# Patient Record
Sex: FEMALE | Race: WHITE | Employment: OTHER | URBAN - METROPOLITAN AREA
[De-identification: names, ages, dates, MRNs, and addresses within clinical notes are randomized per-mention and may not be internally consistent; named-entity substitution may affect disease eponyms.]

---

## 2017-01-03 NOTE — TELEPHONE ENCOUNTER
Patient wants to know if she can get a prescription refill or if she needs to be seen first.    If you can give her a call back when you can.

## 2017-01-04 RX ORDER — VENLAFAXINE HYDROCHLORIDE 150 MG/1
CAPSULE, EXTENDED RELEASE ORAL
Qty: 30 CAP | Refills: 0 | Status: SHIPPED | OUTPATIENT
Start: 2017-01-04 | End: 2017-01-19 | Stop reason: SDUPTHER

## 2017-01-19 ENCOUNTER — OFFICE VISIT (OUTPATIENT)
Dept: NEUROLOGY | Age: 47
End: 2017-01-19

## 2017-01-19 VITALS
WEIGHT: 118 LBS | DIASTOLIC BLOOD PRESSURE: 86 MMHG | HEIGHT: 62 IN | OXYGEN SATURATION: 98 % | BODY MASS INDEX: 21.71 KG/M2 | RESPIRATION RATE: 20 BRPM | SYSTOLIC BLOOD PRESSURE: 130 MMHG | HEART RATE: 97 BPM

## 2017-01-19 DIAGNOSIS — G43.709 CHRONIC MIGRAINE WITHOUT AURA WITHOUT STATUS MIGRAINOSUS, NOT INTRACTABLE: Primary | ICD-10-CM

## 2017-01-19 DIAGNOSIS — F41.9 ANXIETY: ICD-10-CM

## 2017-01-19 RX ORDER — CHOLECALCIFEROL (VITAMIN D3) 125 MCG
220 CAPSULE ORAL DAILY
COMMUNITY
End: 2017-12-14 | Stop reason: ALTCHOICE

## 2017-01-19 RX ORDER — VENLAFAXINE HYDROCHLORIDE 150 MG/1
CAPSULE, EXTENDED RELEASE ORAL
Qty: 30 CAP | Refills: 5 | Status: SHIPPED | OUTPATIENT
Start: 2017-01-19 | End: 2017-07-30 | Stop reason: SDUPTHER

## 2017-01-19 RX ORDER — MEDROXYPROGESTERONE ACETATE 150 MG/ML
150 INJECTION, SUSPENSION INTRAMUSCULAR
COMMUNITY

## 2017-01-19 NOTE — PATIENT INSTRUCTIONS

## 2017-01-19 NOTE — PROGRESS NOTES
Migraines- have been pretty controllable she may have 1-2 a month that might wipe her out   Here recently she had a weeks worth that was rated a 6 all week, more stress and wasn't sure if it was residual from her being in pain with her wrist and arm   It was just more annoying than anything else

## 2017-01-19 NOTE — MR AVS SNAPSHOT
Visit Information Date & Time Provider Department Dept. Phone Encounter #  
 1/19/2017 11:00 AM Kera Fields NP [facility] [phone] [id number] Follow-up Instructions Return in about 6 months (around 7/19/2017). Upcoming Health Maintenance Date Due DTaP/Tdap/Td series (1 - Tdap) 12/31/1992 PAP AKA CERVICAL CYTOLOGY 12/31/1992 INFLUENZA AGE 9 TO ADULT 8/1/2016 Allergies as of 1/19/2017  Review Complete On: 1/19/2017 By: Kera Fields NP Severity Noted Reaction Type Reactions Iodine  12/15/2010    Other (comments) Shellfish Containing Products  12/15/2010    Other (comments) Current Immunizations  Never Reviewed No immunizations on file. Not reviewed this visit You Were Diagnosed With   
  
 Codes Comments Chronic migraine without aura without status migrainosus, not intractable    -  Primary ICD-10-CM: V57.801 ICD-9-CM: 346.70 Anxiety     ICD-10-CM: F41.9 ICD-9-CM: 300.00 Vitals BP Pulse Resp Height(growth percentile) Weight(growth percentile) SpO2  
 130/86 97 20 5' 2\" (1.575 m) 118 lb (53.5 kg) 98% BMI OB Status Smoking Status 21.58 kg/m2 Having regular periods Never Smoker Vitals History BMI and BSA Data Body Mass Index Body Surface Area  
 21.58 kg/m 2 1.53 m 2 Preferred Pharmacy Pharmacy Name Phone [facility] [address] [place] [address] [phone] Your Updated Medication List  
  
   
This list is accurate as of: 1/19/17 11:58 AM.  Always use your most recent med list.  
  
  
  
  
 ALEVE 220 mg Cap Generic drug:  naproxen sodium Take 220 mg by mouth daily. DEPO-PROVERA 150 mg/mL Syrg Generic drug:  medroxyPROGESTERone 150 mg by IntraMUSCular route once. xkspjhv-wlwokielp-zxqqtxjdajjk -325 mg capsule Commonly known as:  Archana Gaviria  
 2 po at onset of headache, then one po Q1 hour x 3 if needed. Max of 5 in 12 hours * OTHER  
EHT- supplement for migraines * OTHER Gabapentin 5%, Meloxicam 0.1%, Topiramate 1% in EMLA cream  1-2 gm 3-4 x per day prn  
  
 venlafaxine- mg capsule Commonly known as:  EFFEXOR-XR  
TAKE ONE CAPSULE BY MOUTH EVERY DAY  
  
 * Notice: This list has 2 medication(s) that are the same as other medications prescribed for you. Read the directions carefully, and ask your doctor or other care provider to review them with you. Prescriptions Sent to Pharmacy Refills  
 venlafaxine-SR (EFFEXOR-XR) 150 mg capsule 5 Sig: TAKE ONE CAPSULE BY MOUTH EVERY DAY Class: Normal  
 Pharmacy: [facility] [address] [place] AT [address] #: [phone] Follow-up Instructions Return in about 6 months (around 7/19/2017). Patient Instructions A Healthy Lifestyle: Care Instructions Your Care Instructions A healthy lifestyle can help you feel good, stay at a healthy weight, and have plenty of energy for both work and play. A healthy lifestyle is something you can share with your whole family. A healthy lifestyle also can lower your risk for serious health problems, such as high blood pressure, heart disease, and diabetes. You can follow a few steps listed below to improve your health and the health of your family. Follow-up care is a key part of your treatment and safety. Be sure to make and go to all appointments, and call your doctor if you are having problems. Its also a good idea to know your test results and keep a list of the medicines you take. How can you care for yourself at home? · Do not eat too much sugar, fat, or fast foods. You can still have dessert and treats now and then. The goal is moderation. · Start small to improve your eating habits.  Pay attention to portion sizes, drink less juice and soda pop, and eat more fruits and vegetables. ¨ Eat a healthy amount of food. A 3-ounce serving of meat, for example, is about the size of a deck of cards. Fill the rest of your plate with vegetables and whole grains. ¨ Limit the amount of soda and sports drinks you have every day. Drink more water when you are thirsty. ¨ Eat at least 5 servings of fruits and vegetables every day. It may seem like a lot, but it is not hard to reach this goal. A serving or helping is 1 piece of fruit, 1 cup of vegetables, or 2 cups of leafy, raw vegetables. Have an apple or some carrot sticks as an afternoon snack instead of a candy bar. Try to have fruits and/or vegetables at every meal. 
· Make exercise part of your daily routine. You may want to start with simple activities, such as walking, bicycling, or slow swimming. Try to be active 30 to 60 minutes every day. You do not need to do all 30 to 60 minutes all at once. For example, you can exercise 3 times a day for 10 or 20 minutes. Moderate exercise is safe for most people, but it is always a good idea to talk to your doctor before starting an exercise program. 
· Keep moving. Ashli ASYM IIIs the lawn, work in the garden, or [employer]. Take the stairs instead of the elevator at work. · If you smoke, quit. People who smoke have an increased risk for heart attack, stroke, cancer, and other lung illnesses. Quitting is hard, but there are ways to boost your chance of quitting tobacco for good. ¨ Use nicotine gum, patches, or lozenges. ¨ Ask your doctor about stop-smoking programs and medicines. ¨ Keep trying. In addition to reducing your risk of diseases in the future, you will notice some benefits soon after you stop using tobacco. If you have shortness of breath or asthma symptoms, they will likely get better within a few weeks after you quit. · Limit how much alcohol you drink.  Moderate amounts of alcohol (up to 2 drinks a day for men, 1 drink a day for women) are okay. But drinking too much can lead to liver problems, high blood pressure, and other health problems. Family health If you have a family, there are many things you can do together to improve your health. · Eat meals together as a family as often as possible. · Eat healthy foods. This includes fruits, vegetables, lean meats and dairy, and whole grains. · Include your family in your fitness plan. Most people think of activities such as jogging or tennis as the way to fitness, but there are many ways you and your family can be more active. Anything that makes you breathe hard and gets your heart pumping is exercise. Here are some tips: 
¨ Walk to do errands or to take your child to school or the bus. ¨ Go for a family bike ride after dinner instead of watching TV. Where can you learn more? Go to http://neeta-princess.info/. Enter R918 in the search box to learn more about \"A Healthy Lifestyle: Care Instructions. \" Current as of: July 26, 2016 Content Version: 11.1 © 2460-2396 Zecter. Care instructions adapted under license by ScreenTag (which disclaims liability or warranty for this information). If you have questions about a medical condition or this instruction, always ask your healthcare professional. [address] any warranty or liability for your use of this information. Introducing hospitals & HEALTH SERVICES! Leny Denson introduces Go800 patient portal. Now you can access parts of your medical record, email your doctor's office, and request medication refills online. 1. In your internet browser, go to [email or web address] 2. Click on the First Time User? Click Here link in the Sign In box. You will see the New Member Sign Up page. 3. Enter your Go800 Access Code exactly as it appears below.  You will not need to use this code after youve completed the sign-up process. If you do not sign up before the expiration date, you must request a new code. · Plivo Access Code: [email or web address] Expires: 4/19/2017 10:50 AM 
 
4. Enter the last four digits of your Social Security Number (xxxx) and Date of Birth (mm/dd/yyyy) as indicated and click Submit. You will be taken to the next sign-up page. 5. Create a Plivo ID. This will be your Plivo login ID and cannot be changed, so think of one that is secure and easy to remember. 6. Create a Plivo password. You can change your password at any time. 7. Enter your Password Reset Question and Answer. This can be used at a later time if you forget your password. 8. Enter your e-mail address. You will receive e-mail notification when new information is available in [address] 9. Click Sign Up. You can now view and download portions of your medical record. 10. Click the Download Summary menu link to download a portable copy of your medical information. If you have questions, please visit the Frequently Asked Questions section of the Plivo website. Remember, Plivo is NOT to be used for urgent needs. For medical emergencies, dial 911. Now available from your iPhone and Android! Please provide this summary of care documentation to your next provider. Your primary care clinician is listed as Jhony Galindo. If you have any questions after today's visit, please call [phone].

## 2017-01-30 NOTE — PROGRESS NOTES
Date:  2017    Name:  aDve Ramirez  :  1970  MRN:  [id number]     PCP:  Telma Lopez MD    Chief Complaint   Patient presents with    Neurologic Problem     migraine     HISTORY OF PRESENT ILLNESS: Follow up for migraines. States that they have been pretty well controlled since her last office visit. She did have Botox more than a year ago which was beneficial but she did not continue with this. Despite this, she has only been having 1-2 per month which would totally wipe her out. Anxiety is still well managed with the Effexor XR. Has not needed to use the Midrin. Still uses the topical cream on the ankle occasionally which helps when this is bothering her. Current Outpatient Prescriptions   Medication Sig    naproxen sodium (ALEVE) 220 mg cap Take 220 mg by mouth daily.  medroxyPROGESTERone (DEPO-PROVERA) 150 mg/mL syrg 150 mg by IntraMUSCular route once.  OTHER EHT- supplement for migraines    venlafaxine-SR (EFFEXOR-XR) 150 mg capsule TAKE ONE CAPSULE BY MOUTH EVERY DAY    emclxhd-xbfucqwfa-rluwszpbkucm (MIDRIN) -325 mg capsule 2 po at onset of headache, then one po Q1 hour x 3 if needed. Max of 5 in 12 hours    OTHER Gabapentin 5%, Meloxicam 0.1%, Topiramate 1% in EMLA cream   1-2 gm 3-4 x per day prn     No current facility-administered medications for this visit.       Allergies   Allergen Reactions    Iodine Other (comments)    Shellfish Containing Products Other (comments)     Past Medical History   Diagnosis Date    Calculus of kidney     Fatigue     Migraine     Paroxysmal atrial fibrillation (HCC)     SVT (supraventricular tachycardia)      hospitalization    Vitiligo      Past Surgical History   Procedure Laterality Date    Pr pelvis/hip joint surgery unlisted       left     Pr excis bartholin gland/cyst       Social History     Social History    Marital status:      Spouse name: N/A    Number of children: N/A    Years of education: N/A     Occupational History    Not on file. Social History Main Topics    Smoking status: Never Smoker    Smokeless tobacco: Never Used    Alcohol use No    Drug use: No    Sexual activity: Yes     Birth control/ protection: Pill     Other Topics Concern    Not on file     Social History Narrative     Family History   Problem Relation Age of Onset   [employer] Parkinsonism Father     Diabetes Brother     Cancer Mother     Diabetes Father        PHYSICAL EXAMINATION:    Visit Vitals    /86    Pulse 97    Resp 20    Ht 5' 2\" (1.575 m)    Wt 53.5 kg (118 lb)    SpO2 98%    BMI 21.58 kg/m2     General: Well defined, nourished, and groomed individual in no acute distress.    Neck: Supple, nontender, no bruits, no pain with resistance to active range of motion.    Heart: Regular rate and rhythm, no murmurs, rub, or gallop. Normal S1S2.    Lungs: Clear to auscultation bilaterally with equal chest expansion, no cough, no wheeze    Musculoskeletal: Extremities revealed no edema and had full range of motion of joints.    Psych: Good mood and bright affect    NEUROLOGICAL EXAMINATION:    Mental Status: Alert and oriented to person, place, and time    Cranial Nerves:    II, III, IV, VI: Visual acuity grossly intact. Visual fields are normal.    Pupils are equal, round, and reactive to light and accommodation.    Extra-ocular movements are full and fluid. Fundoscopic exam was benign, no ptosis or nystagmus.    V-XII: Hearing is grossly intact. Facial features are symmetric, with normal sensation and strength. The palate rises symmetrically and the tongue protrudes midline. Sternocleidomastoids 5/5.    Motor Examination: Normal tone, bulk, and strength, 5/5 muscle strength throughout.    Coordination: No resting or intention tremor    Gait and Station: Steady while walking. Normal arm swing. No muscle wasting or fasiculations noted.    Reflexes: DTRs 2+ throughout.      ASSESSMENT AND PLAN ICD-10-CM ICD-9-CM    1. Chronic migraine without aura without status migrainosus, not intractable G43.709 346.70 venlafaxine-SR (EFFEXOR-XR) 150 mg capsule   2. Anxiety F41.9 300.00 venlafaxine-SR (EFFEXOR-XR) 150 mg capsule     Doing well with regard to migraines and anxiety with Effexor XR. Continue with this as prescribed. Follow up in six months or sooner if needed. Gerda Ellis

## 2017-07-20 ENCOUNTER — OFFICE VISIT (OUTPATIENT)
Dept: NEUROLOGY | Age: 47
End: 2017-07-20

## 2017-07-20 VITALS
RESPIRATION RATE: 20 BRPM | WEIGHT: 120.8 LBS | OXYGEN SATURATION: 99 % | SYSTOLIC BLOOD PRESSURE: 106 MMHG | DIASTOLIC BLOOD PRESSURE: 72 MMHG | BODY MASS INDEX: 22.23 KG/M2 | HEIGHT: 62 IN | HEART RATE: 89 BPM

## 2017-07-20 DIAGNOSIS — G43.709 CHRONIC MIGRAINE WITHOUT AURA WITHOUT STATUS MIGRAINOSUS, NOT INTRACTABLE: Primary | ICD-10-CM

## 2017-07-20 DIAGNOSIS — G43.719 INTRACTABLE CHRONIC MIGRAINE WITHOUT AURA AND WITHOUT STATUS MIGRAINOSUS: ICD-10-CM

## 2017-07-20 RX ORDER — CHOLECALCIFEROL (VITAMIN D3) 125 MCG
220 CAPSULE ORAL
COMMUNITY
End: 2017-12-14 | Stop reason: ALTCHOICE

## 2017-07-20 RX ORDER — CYCLOBENZAPRINE HCL 10 MG
TABLET ORAL
COMMUNITY
Start: 2017-05-19 | End: 2017-12-14

## 2017-07-20 RX ORDER — IBUPROFEN 600 MG/1
TABLET ORAL
COMMUNITY
Start: 2017-05-16 | End: 2017-12-14 | Stop reason: ALTCHOICE

## 2017-07-20 NOTE — PATIENT INSTRUCTIONS

## 2017-07-20 NOTE — MR AVS SNAPSHOT
Visit Information Date & Time Provider Department Dept. Phone Encounter #  
 7/20/2017 10:30 AM Darcy Campos NP [facility] Neurology [facility] [phone] [id number] Upcoming Health Maintenance Date Due DTaP/Tdap/Td series (1 - Tdap) 12/31/1992 PAP AKA CERVICAL CYTOLOGY 12/31/1992 INFLUENZA AGE 9 TO ADULT 8/1/2017 Allergies as of 7/20/2017  Review Complete On: 7/20/2017 By: Kishan Johnson LPN Severity Noted Reaction Type Reactions Iodine  12/15/2010    Other (comments) Shellfish Containing Products  12/15/2010    Other (comments) Current Immunizations  Never Reviewed No immunizations on file. Not reviewed this visit Vitals BP Pulse Resp Height(growth percentile) Weight(growth percentile) SpO2  
 106/72 89 20 5' 2\" (1.575 m) 120 lb 12.8 oz (54.8 kg) 99% BMI OB Status Smoking Status 22.09 kg/m2 Having regular periods Never Smoker Vitals History BMI and BSA Data Body Mass Index Body Surface Area 22.09 kg/m 2 1.55 m 2 Preferred Pharmacy Pharmacy Name Phone [facility] [address] [phone] Your Updated Medication List  
  
   
This list is accurate as of: 7/20/17 11:47 AM.  Always use your most recent med list.  
  
  
  
  
 * ALEVE 220 mg Cap Generic drug:  naproxen sodium Take 220 mg by mouth daily. * naproxen sodium 220 mg Cap Take 220 mg by mouth. cyclobenzaprine 10 mg tablet Commonly known as:  FLEXERIL TK 1 T PO TID  
  
 DEPO-PROVERA 150 mg/mL Syrg Generic drug:  medroxyPROGESTERone 150 mg by IntraMUSCular route once. ibuprofen 600 mg tablet Commonly known as:  MOTRIN  
  
 bteedii-kzvbpxdoo-drqfeszmkxyq -325 mg capsule Commonly known as:  MIDRIN  
2 po at onset of headache, then one po Q1 hour x 3 if needed. Max of 5 in 12 hours  * OTHER  
 EHT- supplement for migraines * OTHER Gabapentin 5%, Meloxicam 0.1%, Topiramate 1% in EMLA cream  1-2 gm 3-4 x per day prn  
  
 venlafaxine- mg capsule Commonly known as:  EFFEXOR-XR  
TAKE ONE CAPSULE BY MOUTH EVERY DAY  
  
 * Notice: This list has 4 medication(s) that are the same as other medications prescribed for you. Read the directions carefully, and ask your doctor or other care provider to review them with you. Patient Instructions A Healthy Lifestyle: Care Instructions Your Care Instructions A healthy lifestyle can help you feel good, stay at a healthy weight, and have plenty of energy for both work and play. A healthy lifestyle is something you can share with your whole family. A healthy lifestyle also can lower your risk for serious health problems, such as high blood pressure, heart disease, and diabetes. You can follow a few steps listed below to improve your health and the health of your family. Follow-up care is a key part of your treatment and safety. Be sure to make and go to all appointments, and call your doctor if you are having problems. Its also a good idea to know your test results and keep a list of the medicines you take. How can you care for yourself at home? · Do not eat too much sugar, fat, or fast foods. You can still have dessert and treats now and then. The goal is moderation. · Start small to improve your eating habits. Pay attention to portion sizes, drink less juice and soda pop, and eat more fruits and vegetables. ¨ Eat a healthy amount of food. A 3-ounce serving of meat, for example, is about the size of a deck of cards. Fill the rest of your plate with vegetables and whole grains. ¨ Limit the amount of soda and sports drinks you have every day. Drink more water when you are thirsty. ¨ Eat at least 5 servings of fruits and vegetables every day.  It may seem like a lot, but it is not hard to reach this goal. A serving or helping is 1 piece of fruit, 1 cup of vegetables, or 2 cups of leafy, raw vegetables. Have an apple or some carrot sticks as an afternoon snack instead of a candy bar. Try to have fruits and/or vegetables at every meal. 
· Make exercise part of your daily routine. You may want to start with simple activities, such as walking, bicycling, or slow swimming. Try to be active 30 to 60 minutes every day. You do not need to do all 30 to 60 minutes all at once. For example, you can exercise 3 times a day for 10 or 20 minutes. Moderate exercise is safe for most people, but it is always a good idea to talk to your doctor before starting an exercise program. 
· Keep moving. Adeline Chencho the lawn, work in the garden, or [employer]. Take the stairs instead of the elevator at work. · If you smoke, quit. People who smoke have an increased risk for heart attack, stroke, cancer, and other lung illnesses. Quitting is hard, but there are ways to boost your chance of quitting tobacco for good. ¨ Use nicotine gum, patches, or lozenges. ¨ Ask your doctor about stop-smoking programs and medicines. ¨ Keep trying. In addition to reducing your risk of diseases in the future, you will notice some benefits soon after you stop using tobacco. If you have shortness of breath or asthma symptoms, they will likely get better within a few weeks after you quit. · Limit how much alcohol you drink. Moderate amounts of alcohol (up to 2 drinks a day for men, 1 drink a day for women) are okay. But drinking too much can lead to liver problems, high blood pressure, and other health problems. Family health If you have a family, there are many things you can do together to improve your health. · Eat meals together as a family as often as possible. · Eat healthy foods. This includes fruits, vegetables, lean meats and dairy, and whole grains. · Include your family in your fitness plan.  Most people think of activities such as jogging or tennis as the way to fitness, but there are many ways you and your family can be more active. Anything that makes you breathe hard and gets your heart pumping is exercise. Here are some tips: 
¨ Walk to do errands or to take your child to school or the bus. ¨ Go for a family bike ride after dinner instead of watching TV. Where can you learn more? Go to http://neeta-princess.info/. Enter F788 in the search box to learn more about \"A Healthy Lifestyle: Care Instructions. \" Current as of: July 26, 2016 Content Version: 11.3 © 4297-5761 2d2c. Care instructions adapted under license by [employer] (which disclaims liability or warranty for this information). If you have questions about a medical condition or this instruction, always ask your healthcare professional. [address] any warranty or liability for your use of this information. Introducing [facility] & HEALTH SERVICES! Celi Ng introduces Carter-Waters patient portal. Now you can access parts of your medical record, email your doctor's office, and request medication refills online. 1. In your internet browser, go to [email or web address] 2. Click on the First Time User? Click Here link in the Sign In box. You will see the New Member Sign Up page. 3. Enter your Carter-Waters Access Code exactly as it appears below. You will not need to use this code after youve completed the sign-up process. If you do not sign up before the expiration date, you must request a new code. · Carter-Waters Access Code: [email or web address] Expires: 10/18/2017 10:28 AM 
 
4. Enter the last four digits of your Social Security Number (xxxx) and Date of Birth (mm/dd/yyyy) as indicated and click Submit. You will be taken to the next sign-up page. 5. Create a Carter-Waters ID.  This will be your Carter-Waters login ID and cannot be changed, so think of one that is secure and easy to remember. 6. Create a DianDian password. You can change your password at any time. 7. Enter your Password Reset Question and Answer. This can be used at a later time if you forget your password. 8. Enter your e-mail address. You will receive e-mail notification when new information is available in [address] 9. Click Sign Up. You can now view and download portions of your medical record. 10. Click the Download Summary menu link to download a portable copy of your medical information. If you have questions, please visit the Frequently Asked Questions section of the DianDian website. Remember, DianDian is NOT to be used for urgent needs. For medical emergencies, dial 911. Now available from your iPhone and Android! Please provide this summary of care documentation to your next provider. Your primary care clinician is listed as Franki Avilez. If you have any questions after today's visit, please call [phone].

## 2017-07-20 NOTE — PROGRESS NOTES
Migraines- have been severe again but she thinks it from all of the pain in her neck and shoulders, she has been back up to about 20 a month  Lasting anywhere from 2-3 days, nothing over the counter is helping not even taking the edge off   She is trying not to take the excedrin migraine very often but she has noticed that she has been going through a bottle of it a month

## 2017-07-20 NOTE — PROGRESS NOTES
Date:  17     Name:  Zaira Mahajan  :  1970  MRN:  [id number]     PCP:  Franki Avilez MD    Chief Complaint   Patient presents with    Migraine       HISTORY OF PRESENT ILLNESSFollow up for migraine. Patient states that her migraines are worse since the last visit. She had an accident that occurred at her job, [employer] her  right arm causing her to have surgery in 2017. Her Injury  happened in 2016 since then she  experienced migraines daily. She has been taken Excedrin and naproxen sodium to relieve her pain she states that her migraines are typically in the left temporal and moves towards her neck she's having about 20 migraines a month rating them to be a 6 out of 10, lasting anywhere from 3 to 4 days. These migraines are accompanied by photophobia phonophobia, aura. Prior, to her injury patient received botox. Last botox was given in  on Botox she did not have any migraines. Except as noted above, denies  fever, chills, cough. No CP or SOB. No dysuria, loss of bowel or bladder control. No Weight loss. Appetite good. Sleeping well. No sweats. No edema. No bruising or bleeding. No nausea or vomit. No diarrhea. No frequency, urgency, No depressive sxs. No anxiety. Denies sore throat, nasal congestion, nasal discharge, epistaxis, tinnitus, hearing loss, back pain, muscle pain, or joint pain. Current Outpatient Prescriptions   Medication Sig    cyclobenzaprine (FLEXERIL) 10 mg tablet TK 1 T PO TID    ibuprofen (MOTRIN) 600 mg tablet     naproxen sodium 220 mg cap Take 220 mg by mouth.  naproxen sodium (ALEVE) 220 mg cap Take 220 mg by mouth daily.  medroxyPROGESTERone (DEPO-PROVERA) 150 mg/mL syrg 150 mg by IntraMUSCular route once.     venlafaxine-SR (EFFEXOR-XR) 150 mg capsule TAKE ONE CAPSULE BY MOUTH EVERY DAY    OTHER Gabapentin 5%, Meloxicam 0.1%, Topiramate 1% in EMLA cream   1-2 gm 3-4 x per day prn    OTHER EHT- supplement for migraines    ygxkruj-txutgmyoe-kcmnfgkjyibt (MIDRIN) -325 mg capsule 2 po at onset of headache, then one po Q1 hour x 3 if needed. Max of 5 in 12 hours     No current facility-administered medications for this visit. Allergies   Allergen Reactions    Iodine Other (comments)    Shellfish Containing Products Other (comments)     Past Medical History:   Diagnosis Date    Calculus of kidney     Fatigue     Migraine     Paroxysmal atrial fibrillation (HCC)     SVT [facility]supraventricular tachycardia) ([facility] [address] 2008    hospitalization    Vitiligo      Past Surgical History:   Procedure Laterality Date    HX OTHER SURGICAL  01/31/2017    wrist surgery, cleaned out all of the scar tissue from an old accident     [address] [facility] GLAND/CYST      LA PELVIS/HIP JOINT SURGERY UNLISTED      left      Social History     Social History    Marital status:      Spouse name: N/A    Number of children: N/A    Years of education: N/A     Occupational History    Not on file. Social History Main Topics    Smoking status: Never Smoker    Smokeless tobacco: Never Used    Alcohol use No    Drug use: No    Sexual activity: Yes     Birth control/ protection: Pill     Other Topics Concern    Not on file     Social History Narrative     Family History   Problem Relation Age of Onset   Keeler Sous Parkinsonism Father     Diabetes Father     Diabetes Brother     Cancer Mother        PHYSICAL EXAMINATION:    Visit Vitals    /72    Pulse 89    Resp 20    Ht 5' 2\" (1.575 m)    Wt 54.8 kg (120 lb 12.8 oz)    SpO2 99%    BMI 22.09 kg/m2     General: Well defined, nourished, and groomed individual in no acute distress.    Neck: Supple, nontender, no bruits, no pain with resistance to active range of motion.    Heart: Regular rate and rhythm, no murmurs, rub, or gallop.  Normal S1S2.    Lungs: Clear to auscultation bilaterally with equal chest expansion, no cough, no wheeze    Musculoskeletal: Extremities revealed no edema and had full range of motion of joints.    Psych: Good mood and bright affect    NEUROLOGICAL EXAMINATION:    Mental Status: Alert and oriented to person, place, and time    Cranial Nerves:    II, III, IV, VI: Visual acuity grossly intact. Visual fields are normal.    Pupils are equal, round, and reactive to light and accommodation.    Extra-ocular movements are full and fluid. Fundoscopic exam was benign, no ptosis or nystagmus.    V-XII: Hearing is grossly intact. Facial features are symmetric, with normal sensation and strength. The palate rises symmetrically and the tongue protrudes midline. Sternocleidomastoids 5/5.    Motor Examination: Normal tone, bulk, and strength, 5/5 muscle strength throughout.    Coordination: No resting or intention tremor    Gait and Station: Steady while walking. Normal arm swing. No muscle wasting or fasiculations noted.    Reflexes: DTRs 2+ throughout.        ASSESSMENT AND PLAN    ICD-10-CM ICD-9-CM    1. Chronic migraine without aura without status migrainosus, not intractable G43.709 346.70 REFERRAL TO NEUROLOGY   2. Intractable chronic migraine without aura and without status migrainosus G43.719 346.71     Due to injury patient migraines are no longer controlled. Will would like to restart her Botox. We will continue her other preventive medication as prescribed.  Follow up in 8 week after botox     Deidra HARRIS

## 2017-07-22 NOTE — PROGRESS NOTES
This patient was seen and evaluated in association with Ms. Thad Mendez. Independent history and physical was obtained. I do agree with her present plan of care as outlined. In the past, patient has been on Botox with excellent control of her migraines. She continues to take Effexor XR for control of her anxiety though this is not helping with her migraines at this point. She is also been on Topamax, amitriptyline, and beta-blockers. We will try to reinitiate her Botox as she does continue to have 20 or more migraines per month for the last 3-4 months. Follow-up in 8 weeks after her Botox procedure. Gerda Adam

## 2017-07-30 DIAGNOSIS — G43.709 CHRONIC MIGRAINE WITHOUT AURA WITHOUT STATUS MIGRAINOSUS, NOT INTRACTABLE: ICD-10-CM

## 2017-07-30 DIAGNOSIS — F41.9 ANXIETY: ICD-10-CM

## 2017-08-03 ENCOUNTER — TELEPHONE (OUTPATIENT)
Dept: NEUROLOGY | Age: 47
End: 2017-08-03

## 2017-08-03 RX ORDER — VENLAFAXINE HYDROCHLORIDE 150 MG/1
CAPSULE, EXTENDED RELEASE ORAL
Qty: 30 CAP | Refills: 3 | Status: SHIPPED | OUTPATIENT
Start: 2017-08-03 | End: 2017-11-28 | Stop reason: SDUPTHER

## 2017-08-03 NOTE — TELEPHONE ENCOUNTER
----- Message from Verona Flannery sent at 8/2/2017  3:47 PM EDT -----  Regarding: FUAD Tapia/Heraclio  Pt  Mr. Contreras Mail is requesting \"Venaflaxine  mg cap\" Rx Anuel p [phone]. Please give a call . Best contact [phone].

## 2017-08-03 NOTE — TELEPHONE ENCOUNTER
----- Message from Angelito Bella sent at 8/3/2017  8:42 AM EDT -----  Regarding: FUAD Tapia/Telephone  Diony Montenegro called to speak with [facility] about the approval for the botox inj [facility]#[id number]  Doesn't exp until 01/25/2018.  Best contact number [phone]

## 2017-08-03 NOTE — TELEPHONE ENCOUNTER
----- Message from Justin Mayo sent at 8/2/2017  3:47 PM EDT -----  Regarding: FUAD Tapia/Heraclio  Pt  Mr. Juarez Wilder is requesting \"Venaflaxine  mg cap\" Rx Anuel p [phone]. Please give a call . Best contact [phone].

## 2017-09-06 ENCOUNTER — TELEPHONE (OUTPATIENT)
Dept: NEUROLOGY | Age: 47
End: 2017-09-06

## 2017-09-06 NOTE — TELEPHONE ENCOUNTER
----- Message from Patrick Whitten sent at 9/6/2017  8:39 AM EDT -----  Regarding: Shell Thompson, NP/prescription request  Pt would like a call back from the nurse regarding getting the botox injection ordered. Pt has been waiting since 7/20/17 to get confirmation that it had been ordered. Pt can be reached at [phone].

## 2017-09-14 NOTE — TELEPHONE ENCOUNTER
Pt would like to have call back regarding to pt's botox. Pt has been watting but never heard back.  Thank you

## 2017-09-18 NOTE — TELEPHONE ENCOUNTER
Called and spoke to patient let her know we are waiting for delivery of botox   Gave her [facility] number  Patient will call her speciality pharmacy

## 2017-09-26 ENCOUNTER — OFFICE VISIT (OUTPATIENT)
Dept: NEUROLOGY | Age: 47
End: 2017-09-26

## 2017-09-26 ENCOUNTER — TELEPHONE (OUTPATIENT)
Dept: NEUROLOGY | Age: 47
End: 2017-09-26

## 2017-09-26 VITALS
HEIGHT: 62 IN | WEIGHT: 122.2 LBS | BODY MASS INDEX: 22.49 KG/M2 | OXYGEN SATURATION: 98 % | HEART RATE: 112 BPM | TEMPERATURE: 98.6 F | RESPIRATION RATE: 18 BRPM | DIASTOLIC BLOOD PRESSURE: 78 MMHG | SYSTOLIC BLOOD PRESSURE: 120 MMHG

## 2017-09-26 DIAGNOSIS — G43.719 INTRACTABLE CHRONIC MIGRAINE WITHOUT AURA AND WITHOUT STATUS MIGRAINOSUS: Primary | ICD-10-CM

## 2017-09-26 NOTE — MR AVS SNAPSHOT
Visit Information Date & Time Provider Department Dept. Phone Encounter #  
 9/26/2017  3:30 PM FUAD Barragan Neurology [facility] [phone] [id number] Upcoming Health Maintenance Date Due DTaP/Tdap/Td series (1 - Tdap) 12/31/1992 PAP AKA CERVICAL CYTOLOGY 12/31/1992 INFLUENZA AGE 9 TO ADULT 8/1/2017 Allergies as of 9/26/2017  Review Complete On: 9/26/2017 By: Clay Perkins LPN Severity Noted Reaction Type Reactions Iodine  12/15/2010    Other (comments) Shellfish Containing Products  12/15/2010    Other (comments) Current Immunizations  Never Reviewed No immunizations on file. Not reviewed this visit You Were Diagnosed With   
  
 Codes Comments Intractable chronic migraine without aura and without status migrainosus    -  Primary ICD-10-CM: G82.720 ICD-9-CM: 346.71 Vitals BP Pulse Temp Resp Height(growth percentile) Weight(growth percentile) 120/78 (!) 112 98.6 °F (37 °C) (Oral) 18 5' 2\" (1.575 m) 122 lb 3.2 oz (55.4 kg) SpO2 BMI OB Status Smoking Status 98% 22.35 kg/m2 Having regular periods Never Smoker Vitals History BMI and BSA Data Body Mass Index Body Surface Area  
 22.35 kg/m 2 1.56 m 2 Preferred Pharmacy Pharmacy Name Phone [address] [facility], [address] [place] [address] Your Updated Medication List  
  
   
This list is accurate as of: 9/26/17  4:21 PM.  Always use your most recent med list.  
  
  
  
  
 * ALEVE 220 mg Cap Generic drug:  naproxen sodium Take 220 mg by mouth daily. * naproxen sodium 220 mg Cap Take 220 mg by mouth. cyclobenzaprine 10 mg tablet Commonly known as:  FLEXERIL TK 1 T PO TID  
  
 DEPO-PROVERA 150 mg/mL Syrg Generic drug:  medroxyPROGESTERone 150 mg by IntraMUSCular route once. ibuprofen 600 mg tablet Commonly known as:  MOTRIN  
  
 onabotulinumtoxinA 200 unit injection Commonly known as:  BOTOX Inject in face/neck intramuscularly in 31 FDA approves sites, every 3 months, Dx:G43.709 OTHER Gabapentin 5%, Meloxicam 0.1%, Topiramate 1% in EMLA cream  1-2 gm 3-4 x per day prn  
  
 venlafaxine- mg capsule Commonly known as:  EFFEXOR-XR  
TAKE 1 CAPSULE BY MOUTH EVERY DAY  
  
 * Notice: This list has 2 medication(s) that are the same as other medications prescribed for you. Read the directions carefully, and ask your doctor or other care provider to review them with you. Introducing [address]! Laytonrose mary Dima introduces [employer] patient portal. Now you can access parts of your medical record, email your doctor's office, and request medication refills online. 1. In your internet browser, go to [email or web address] 2. Click on the First Time User? Click Here link in the Sign In box. You will see the New Member Sign Up page. 3. Enter your [employer] Access Code exactly as it appears below. You will not need to use this code after youve completed the sign-up process. If you do not sign up before the expiration date, you must request a new code. · [employer] Access Code: [email or web address] Expires: 10/18/2017 10:28 AM 
 
4. Enter the last four digits of your Social Security Number (xxxx) and Date of Birth (mm/dd/yyyy) as indicated and click Submit. You will be taken to the next sign-up page. 5. Create a Printed Piecet ID. This will be your [employer] login ID and cannot be changed, so think of one that is secure and easy to remember. 6. Create a [employer] password. You can change your password at any time. 7. Enter your Password Reset Question and Answer. This can be used at a later time if you forget your password. 8. Enter your e-mail address. You will receive e-mail notification when new information is available in [address] 9. Click Sign Up.  You can now view and download portions of your medical record. 10. Click the Download Summary menu link to download a portable copy of your medical information. If you have questions, please visit the Frequently Asked Questions section of the [employer] website. Remember, [employer] is NOT to be used for urgent needs. For medical emergencies, dial 911. Now available from your iPhone and Android! Please provide this summary of care documentation to your next provider. Your primary care clinician is listed as Nikki Logan. If you have any questions after today's visit, please call [phone].

## 2017-09-26 NOTE — TELEPHONE ENCOUNTER
Order placed for botox, # 200units, IM, per Verbal Order from Carrington Mata NP on 9/26/2017 due to chronic migraines.

## 2017-09-28 NOTE — PROGRESS NOTES
Patient presented today solely because she could not get through to the office to find out what was going on with her Botox. This was taking care while she was here in the office.

## 2017-10-05 ENCOUNTER — TELEPHONE (OUTPATIENT)
Dept: NEUROLOGY | Age: 47
End: 2017-10-05

## 2017-10-13 ENCOUNTER — OFFICE VISIT (OUTPATIENT)
Dept: NEUROLOGY | Age: 47
End: 2017-10-13

## 2017-10-13 VITALS
DIASTOLIC BLOOD PRESSURE: 80 MMHG | BODY MASS INDEX: 22.45 KG/M2 | SYSTOLIC BLOOD PRESSURE: 126 MMHG | HEART RATE: 90 BPM | WEIGHT: 122 LBS | OXYGEN SATURATION: 98 % | HEIGHT: 62 IN

## 2017-10-13 DIAGNOSIS — G43.719 INTRACTABLE CHRONIC MIGRAINE WITHOUT AURA AND WITHOUT STATUS MIGRAINOSUS: Primary | ICD-10-CM

## 2017-10-13 NOTE — PROGRESS NOTES
Pain scale prior to procedure  8 /10  Pain scale post procedure    8 /10  Patient states over 15 headaches a month & lasting over 4  hrs  Been treated for headaches greater than 6 months  Consent signed     Instructions post injection discussed with patient   1. Do not lay flat for 4 hrs  2. Do not press on injection sites up to 24 hrs.         What to expect  Possible bleeding and/or swelling  at injection sites      Patient verbalizes understanding

## 2017-10-13 NOTE — PROCEDURES
[facility] [place]  OFFICE PROCEDURE PROGRESS NOTE        Chart reviewed for the following:   Refugia Opitz, MD, have reviewed the History, Physical and updated the Allergic reactions for [facility] performed immediately prior to start of procedure:   Refugia Opitz, MD, have performed the following reviews on [address] [facility] [address] prior to the start of the procedure:            * Patient was identified by name and date of birth   * Agreement on procedure being performed was verified  * Risks and Benefits explained to the patient  * Procedure site verified and marked as necessary  * Patient was positioned for comfort  * Consent was signed and verified     Time: 9645  Date of procedure: 10/13/2017  Procedure performed by:  Alpa Eric MD  Provider assisted by: None  Patient assisted by:   How tolerated by patient: tolerated the procedure well with no complications  Comments: None    Botox Injection Note    Indication:   Patient has chronic migraine, and has tried/ failed multiple headache preventive medications (see clinic notes for details). She had done Bootx injections a few times in the past but stopped as there was some issue with one of her heart medications. She presents today to restart Botox injection to reduce overall headache frequency, including migraines. Procedure:    Botox concentration: 200 units in 4 ml of preservative-free normal saline.   31 sites injections, distribution as follow         Units/site Sites Sides subtotal  Procerus     5  1 1 5        5   1 2 10  Frontalis     5  2 2 20  Temporalis    5  4 2 40  Occipitalis    5  3 2 30   Upper cervical paraspinalis  5  2 2 20   Trapezius    5  3 2 30    200 units Botox were reconstituted, 155 units injected as above and the remainder was unavoidably wasted    Patient tolerated procedure well.      _____________________________  Alpa Eric M.D.

## 2017-10-13 NOTE — MR AVS SNAPSHOT
Visit Information Date & Time Provider Department Dept. Phone Encounter #  
 10/13/2017  8:00 AM [facility] [phone] [id number] Follow-up Instructions Return in about 6 weeks (around 11/24/2017). Upcoming Health Maintenance Date Due DTaP/Tdap/Td series (1 - Tdap) 12/31/1992 PAP AKA CERVICAL CYTOLOGY 12/31/1992 INFLUENZA AGE 9 TO ADULT 8/1/2017 Allergies as of 10/13/2017  Review Complete On: 10/13/2017 By: Juani Wallis LPN Severity Noted Reaction Type Reactions Iodine  12/15/2010    Other (comments) Shellfish Containing Products  12/15/2010    Other (comments) Current Immunizations  Never Reviewed No immunizations on file. Not reviewed this visit You Were Diagnosed With   
  
 Codes Comments Intractable chronic migraine without aura and without status migrainosus    -  Primary ICD-10-CM: X86.869 ICD-9-CM: 346.71 Vitals BP Pulse Height(growth percentile) Weight(growth percentile) SpO2 BMI  
 126/80 (BP 1 Location: Left arm, BP Patient Position: Sitting) 90 5' 2\" (1.575 m) 122 lb (55.3 kg) 98% 22.31 kg/m2 OB Status Smoking Status Having regular periods Never Smoker BMI and BSA Data Body Mass Index Body Surface Area  
 22.31 kg/m 2 1.56 m 2 Preferred Pharmacy Pharmacy Name Phone [facility] [place] Your Updated Medication List  
  
   
This list is accurate as of: 10/13/17  8:29 AM.  Always use your most recent med list.  
  
  
  
  
 * ALEVE 220 mg Cap Generic drug:  naproxen sodium Take 220 mg by mouth daily. * naproxen sodium 220 mg Cap Take 220 mg by mouth. cyclobenzaprine 10 mg tablet Commonly known as:  FLEXERIL TK 1 T PO TID  
  
 DEPO-PROVERA 150 mg/mL Syrg Generic drug:  medroxyPROGESTERone 150 mg by IntraMUSCular route once. ibuprofen 600 mg tablet Commonly known as:  MOTRIN  
  
 onabotulinumtoxinA 200 unit injection Commonly known as:  BOTOX Inject in face/neck intramuscularly in 31 FDA approves sites, every 3 months, Dx:G43.709 OTHER Gabapentin 5%, Meloxicam 0.1%, Topiramate 1% in EMLA cream  1-2 gm 3-4 x per day prn  
  
 venlafaxine- mg capsule Commonly known as:  EFFEXOR-XR  
TAKE 1 CAPSULE BY MOUTH EVERY DAY  
  
 * Notice: This list has 2 medication(s) that are the same as other medications prescribed for you. Read the directions carefully, and ask your doctor or other care provider to review them with you. We Performed the Following [address] [id number] CPT(R)] Follow-up Instructions Return in about 6 weeks (around 11/24/2017). Introducing [facility]! Dear Carroll: 
Thank you for requesting a [employer] account. Our records indicate that you already have an active [employer] account. You can access your account anytime at [email or web address] Did you know that you can access your hospital and ER discharge instructions at any time in [employer]? You can also review all of your test results from your hospital stay or ER visit. Additional Information If you have questions, please visit the Frequently Asked Questions section of the [employer] website at [email or web address] Remember, [employer] is NOT to be used for urgent needs. For medical emergencies, dial 911. Now available from your iPhone and Android! Please provide this summary of care documentation to your next provider. Your primary care clinician is listed as Amira Troy. If you have any questions after today's visit, please call [phone].

## 2017-11-28 DIAGNOSIS — F41.9 ANXIETY: ICD-10-CM

## 2017-11-28 DIAGNOSIS — G43.709 CHRONIC MIGRAINE WITHOUT AURA WITHOUT STATUS MIGRAINOSUS, NOT INTRACTABLE: ICD-10-CM

## 2017-11-28 RX ORDER — VENLAFAXINE HYDROCHLORIDE 150 MG/1
CAPSULE, EXTENDED RELEASE ORAL
Qty: 30 CAP | Refills: 0 | Status: SHIPPED | OUTPATIENT
Start: 2017-11-28 | End: 2018-01-02 | Stop reason: SDUPTHER

## 2017-12-14 ENCOUNTER — OFFICE VISIT (OUTPATIENT)
Dept: NEUROLOGY | Age: 47
End: 2017-12-14

## 2017-12-14 VITALS
RESPIRATION RATE: 20 BRPM | DIASTOLIC BLOOD PRESSURE: 72 MMHG | BODY MASS INDEX: 22.63 KG/M2 | HEART RATE: 97 BPM | OXYGEN SATURATION: 99 % | HEIGHT: 62 IN | WEIGHT: 123 LBS | SYSTOLIC BLOOD PRESSURE: 116 MMHG

## 2017-12-14 DIAGNOSIS — G43.719 INTRACTABLE CHRONIC MIGRAINE WITHOUT AURA AND WITHOUT STATUS MIGRAINOSUS: Primary | ICD-10-CM

## 2017-12-14 DIAGNOSIS — S49.91XD INJURY OF RIGHT SHOULDER, SUBSEQUENT ENCOUNTER: ICD-10-CM

## 2017-12-14 RX ORDER — MELOXICAM 15 MG/1
15 TABLET ORAL DAILY
Qty: 30 TAB | Refills: 3 | Status: SHIPPED | OUTPATIENT
Start: 2017-12-14 | End: 2018-04-19

## 2017-12-14 NOTE — PATIENT INSTRUCTIONS

## 2017-12-14 NOTE — PROGRESS NOTES
Migraines- they are hit or miss, the botox she thinks is helping   The stress she has and the pain in her shoulder and neck she feels is what is causing most of the migraines she has   She has had 2 pretty severe this month that she thought she would end up in the ER for but she just tried to sleep them off   Having about 17 migraines a month, that does include the 2 severe ones   Lasting all day usually, with medication   Excedrin migraine is what she has been usuing to try and get rid of it, she has taken it probably 4 times this week already   The migraine she has now she is going on 4 days

## 2017-12-14 NOTE — MR AVS SNAPSHOT
Visit Information Date & Time Provider Department Dept. Phone Encounter #  
 12/14/2017  9:30 AM FUAD Almaraz [facility] [phone] [id number] Upcoming Health Maintenance Date Due DTaP/Tdap/Td series (1 - Tdap) 12/31/1992 PAP AKA CERVICAL CYTOLOGY 12/31/1992 Influenza Age 5 to Adult 8/1/2017 Allergies as of 12/14/2017  Review Complete On: 12/14/2017 By: Fidel Alonzo NP Severity Noted Reaction Type Reactions Iodine  12/15/2010    Other (comments) Shellfish Containing Products  12/15/2010    Other (comments) Current Immunizations  Never Reviewed No immunizations on file. Not reviewed this visit You Were Diagnosed With   
  
 Codes Comments Intractable chronic migraine without aura and without status migrainosus    -  Primary ICD-10-CM: K48.992 ICD-9-CM: 346.71 Injury of right shoulder, subsequent encounter     ICD-10-CM: S49.91XD ICD-9-CM: V58.89, 959.2 Vitals BP Pulse Resp Height(growth percentile) Weight(growth percentile) SpO2  
 116/72 97 20 5' 2\" (1.575 m) 123 lb (55.8 kg) 99% BMI OB Status Smoking Status 22.5 kg/m2 Having regular periods Never Smoker Vitals History BMI and BSA Data Body Mass Index Body Surface Area  
 22.5 kg/m 2 1.56 m 2 Preferred Pharmacy Pharmacy Name Phone [facility] [address] [facility] [phone] Your Updated Medication List  
  
   
This list is accurate as of: 12/14/17 10:28 AM.  Always use your most recent med list.  
  
  
  
  
 DEPO-PROVERA 150 mg/mL Syrg Generic drug:  medroxyPROGESTERone 150 mg by IntraMUSCular route once. meloxicam 15 mg tablet Commonly known as:  MOBIC Take 1 Tab by mouth daily. onabotulinumtoxinA 200 unit injection Commonly known as:  BOTOX Inject in face/neck intramuscularly in 31 FDA approves sites, every 3 months, Dx:G43.709 OTHER Gabapentin 5%, Meloxicam 0.1%, Topiramate 1% in EMLA cream  1-2 gm 3-4 x per day prn  
  
 venlafaxine- mg capsule Commonly known as:  EFFEXOR-XR  
TAKE ONE CAPSULE BY MOUTH EVERY DAY Prescriptions Sent to Pharmacy Refills  
 meloxicam (MOBIC) 15 mg tablet 3 Sig: Take 1 Tab by mouth daily. Class: Normal  
 Pharmacy: [facility] [employer] [place], [address] [place] [address],[address] #: [phone] Route: Oral  
  
Patient Instructions A Healthy Lifestyle: Care Instructions Your Care Instructions A healthy lifestyle can help you feel good, stay at a healthy weight, and have plenty of energy for both work and play. A healthy lifestyle is something you can share with your whole family. A healthy lifestyle also can lower your risk for serious health problems, such as high blood pressure, heart disease, and diabetes. You can follow a few steps listed below to improve your health and the health of your family. Follow-up care is a key part of your treatment and safety. Be sure to make and go to all appointments, and call your doctor if you are having problems. It's also a good idea to know your test results and keep a list of the medicines you take. How can you care for yourself at home? · Do not eat too much sugar, fat, or fast foods. You can still have dessert and treats now and then. The goal is moderation. · Start small to improve your eating habits. Pay attention to portion sizes, drink less juice and soda pop, and eat more fruits and vegetables. ¨ Eat a healthy amount of food. A 3-ounce serving of meat, for example, is about the size of a deck of cards. Fill the rest of your plate with vegetables and whole grains. ¨ Limit the amount of soda and sports drinks you have every day. Drink more water when you are thirsty. ¨ Eat at least 5 servings of fruits and vegetables every day. It may seem like a lot, but it is not hard to reach this goal. A serving or helping is 1 piece of fruit, 1 cup of vegetables, or 2 cups of leafy, raw vegetables. Have an apple or some carrot sticks as an afternoon snack instead of a candy bar. Try to have fruits and/or vegetables at every meal. 
· Make exercise part of your daily routine. You may want to start with simple activities, such as walking, bicycling, or slow swimming. Try to be active 30 to 60 minutes every day. You do not need to do all 30 to 60 minutes all at once. For example, you can exercise 3 times a day for 10 or 20 minutes. Moderate exercise is safe for most people, but it is always a good idea to talk to your doctor before starting an exercise program. 
· Keep moving. Romero Shields the lawn, work in the garden, or [place]. Take the stairs instead of the elevator at work. · If you smoke, quit. People who smoke have an increased risk for heart attack, stroke, cancer, and other lung illnesses. Quitting is hard, but there are ways to boost your chance of quitting tobacco for good. ¨ Use nicotine gum, patches, or lozenges. ¨ Ask your doctor about stop-smoking programs and medicines. ¨ Keep trying. In addition to reducing your risk of diseases in the future, you will notice some benefits soon after you stop using tobacco. If you have shortness of breath or asthma symptoms, they will likely get better within a few weeks after you quit. · Limit how much alcohol you drink. Moderate amounts of alcohol (up to 2 drinks a day for men, 1 drink a day for women) are okay. But drinking too much can lead to liver problems, high blood pressure, and other health problems. Family health If you have a family, there are many things you can do together to improve your health. · Eat meals together as a family as often as possible. · Eat healthy foods.  This includes fruits, vegetables, lean meats and dairy, and whole grains. · Include your family in your fitness plan. Most people think of activities such as jogging or tennis as the way to fitness, but there are many ways you and your family can be more active. Anything that makes you breathe hard and gets your heart pumping is exercise. Here are some tips: 
¨ Walk to do errands or to take your child to school or the bus. ¨ Go for a family bike ride after dinner instead of watching TV. Where can you learn more? Go to http://neeta-princess.info/. Enter M499 in the search box to learn more about \"A Healthy Lifestyle: Care Instructions. \" Current as of: May 12, 2017 Content Version: 11.4 © 0257-9339 [employer]. Care instructions adapted under license by [employer] (which disclaims liability or warranty for this information). If you have questions about a medical condition or this instruction, always ask your healthcare professional. [place] [address] any warranty or liability for your use of this information. Introducing [facility]! Dear Frida Gaona: 
Thank you for requesting a Remicalm account. Our records indicate that you already have an active Remicalm account. You can access your account anytime at [email or web address] Did you know that you can access your hospital and ER discharge instructions at any time in Remicalm? You can also review all of your test results from your hospital stay or ER visit. Additional Information If you have questions, please visit the Frequently Asked Questions section of the Remicalm website at [email or web address] Remember, Remicalm is NOT to be used for urgent needs. For medical emergencies, dial 911. Now available from your iPhone and Android! Please provide this summary of care documentation to your next provider. Your primary care clinician is listed as Sherri Mata.  If you have any questions after today's visit, please call [phone].

## 2017-12-14 NOTE — PROGRESS NOTES
Date:  17     Name:  Neelam Hernandez  :  1970  MRN:  [id number]     PCP:  Mindy Saleh MD    Chief Complaint   Patient presents with    Migraine     f/up after botox      HISTORY OF PRESENT ILLNESS: Follow up evaluation of migraines. Since her last visit, she has had one round of Botox at this point. She was having some form of headache daily with 20+ migraine days per month. Now she has had about 17 in a month and not getting the daily headaches. She is still has shoulder and neck pain which she thinks might be triggering her headaches. Except as noted above, denies  fever, chills, cough. No CP or SOB. No dysuria, loss of bowel or bladder control. No Weight loss. Appetite good. Sleeping well. No sweats. No edema. No bruising or bleeding. No nausea or vomit. No diarrhea. No frequency, urgency, No depressive sxs. No anxiety. Denies sore throat, nasal congestion, nasal discharge, epistaxis, tinnitus, hearing loss, back pain, muscle pain, or joint pain. Current Outpatient Prescriptions   Medication Sig    venlafaxine-SR (EFFEXOR-XR) 150 mg capsule TAKE ONE CAPSULE BY MOUTH EVERY DAY    onabotulinumtoxinA (BOTOX) 200 unit injection Inject in face/neck intramuscularly in 31 FDA approves sites, every 3 months, Dx:G43.709    ibuprofen (MOTRIN) 600 mg tablet     naproxen sodium 220 mg cap Take 220 mg by mouth.  naproxen sodium (ALEVE) 220 mg cap Take 220 mg by mouth daily.  medroxyPROGESTERone (DEPO-PROVERA) 150 mg/mL syrg 150 mg by IntraMUSCular route once.  OTHER Gabapentin 5%, Meloxicam 0.1%, Topiramate 1% in EMLA cream   1-2 gm 3-4 x per day prn     No current facility-administered medications for this visit.       Allergies   Allergen Reactions    Iodine Other (comments)    Shellfish Containing Products Other (comments)     Past Medical History:   Diagnosis Date    Calculus of kidney     Fatigue     Migraine     [facility] [facility]     [facility]    hospitalization    Vitiligo      Past Surgical History:   Procedure Laterality Date    HX OTHER SURGICAL  01/31/2017    wrist surgery, cleaned out all of the scar tissue from an old accident     [address] [facility] [place] GLAND/CYST      [place] PELVIS/HIP JOINT SURGERY UNLISTED      left      Social History     Social History    Marital status:      Spouse name: N/A    Number of children: N/A    Years of education: N/A     Occupational History    Not on file. Social History Main Topics    Smoking status: Never Smoker    Smokeless tobacco: Never Used    Alcohol use No    Drug use: No    Sexual activity: Yes     Birth control/ protection: Pill     Other Topics Concern    Not on file     Social History Narrative     Family History   Problem Relation Age of Onset   [facility] Parkinsonism Father     Diabetes Father     Diabetes Brother     Cancer Mother        PHYSICAL EXAMINATION:    Visit Vitals    /72    Pulse 97    Resp 20    Ht 5' 2\" (1.575 m)    Wt 55.8 kg (123 lb)    SpO2 99%    BMI 22.5 kg/m2     General: Well defined, nourished, and groomed individual in no acute distress.    Neck: Supple, nontender, no bruits, no pain with resistance to active range of motion.    Heart: Regular rate and rhythm, no murmurs, rub, or gallop. Normal S1S2.    Lungs: Clear to auscultation bilaterally with equal chest expansion, no cough, no wheeze    Musculoskeletal: Extremities revealed no edema and had full range of motion of joints.    Psych: Good mood and bright affect    NEUROLOGICAL EXAMINATION:    Mental Status: Alert and oriented to person, place, and time    Cranial Nerves:    II, III, IV, VI: Visual acuity grossly intact. Visual fields are normal.    Pupils are equal, round, and reactive to light and accommodation.    Extra-ocular movements are full and fluid. Fundoscopic exam was benign, no ptosis or nystagmus.    V-XII: Hearing is grossly intact.  Facial features are symmetric, with normal sensation and strength. The palate rises symmetrically and the tongue protrudes midline. Sternocleidomastoids 5/5.    Motor Examination: Normal tone, bulk, and strength, 5/5 muscle strength throughout.    Coordination: No resting or intention tremor    Gait and Station: Steady while walking. Normal arm swing. No muscle wasting or fasiculations noted.    Reflexes: DTRs 2+ throughout. ASSESSMENT AND PLAN    ICD-10-CM ICD-9-CM    1. Intractable chronic migraine without aura and without status migrainosus G43.719 346.71    2. Injury of right shoulder, subsequent encounter S49. 91XD V58.89 meloxicam (MOBIC) 15 mg tablet     959.2      Significant improvement in headache pattern and severity since her initial Botox procedure in October. No longer has daily headaches with severe migraine at least 20-25 times per month. Now has about 15-17 headache days per month. She does continue to have right shoulder pain since her injury for which she has been using as needed Aleve or Ibuprofen and participating in PT. Continue with Botox with next procedure due in January. Provided with Mobic daily to be used instead of the other NSAIDs as needed. Instructed to refrain from using the other NSAIDs with the Mobic. Continue with PT. Follow up in eight weeks following her next Botox procedure. Gerda Sanchez

## 2018-01-02 DIAGNOSIS — G43.709 CHRONIC MIGRAINE WITHOUT AURA WITHOUT STATUS MIGRAINOSUS, NOT INTRACTABLE: ICD-10-CM

## 2018-01-02 DIAGNOSIS — F41.9 ANXIETY: ICD-10-CM

## 2018-01-02 RX ORDER — VENLAFAXINE HYDROCHLORIDE 150 MG/1
CAPSULE, EXTENDED RELEASE ORAL
Qty: 30 CAP | Refills: 0 | Status: SHIPPED | OUTPATIENT
Start: 2018-01-02 | End: 2018-02-01 | Stop reason: SDUPTHER

## 2018-02-01 DIAGNOSIS — G43.709 CHRONIC MIGRAINE WITHOUT AURA WITHOUT STATUS MIGRAINOSUS, NOT INTRACTABLE: ICD-10-CM

## 2018-02-01 DIAGNOSIS — F41.9 ANXIETY: ICD-10-CM

## 2018-02-01 RX ORDER — VENLAFAXINE HYDROCHLORIDE 150 MG/1
CAPSULE, EXTENDED RELEASE ORAL
Qty: 30 CAP | Refills: 0 | Status: SHIPPED | OUTPATIENT
Start: 2018-02-01 | End: 2018-03-04 | Stop reason: SDUPTHER

## 2018-02-23 ENCOUNTER — HOSPITAL ENCOUNTER (OUTPATIENT)
Dept: MRI IMAGING | Age: 48
Discharge: HOME | End: 2018-02-23
Attending: ORTHOPAEDIC SURGERY
Payer: COMMERCIAL

## 2018-02-23 DIAGNOSIS — M65.841 OTHER SYNOVITIS AND TENOSYNOVITIS, RIGHT HAND: ICD-10-CM

## 2018-02-23 PROCEDURE — 73218 MRI UPPER EXTREMITY W/O DYE: CPT

## 2018-03-04 DIAGNOSIS — F41.9 ANXIETY: ICD-10-CM

## 2018-03-04 DIAGNOSIS — G43.709 CHRONIC MIGRAINE WITHOUT AURA WITHOUT STATUS MIGRAINOSUS, NOT INTRACTABLE: ICD-10-CM

## 2018-03-04 RX ORDER — VENLAFAXINE HYDROCHLORIDE 150 MG/1
CAPSULE, EXTENDED RELEASE ORAL
Qty: 30 CAP | Refills: 0 | Status: SHIPPED | OUTPATIENT
Start: 2018-03-04 | End: 2018-04-02 | Stop reason: SDUPTHER

## 2018-04-02 DIAGNOSIS — G43.709 CHRONIC MIGRAINE WITHOUT AURA WITHOUT STATUS MIGRAINOSUS, NOT INTRACTABLE: ICD-10-CM

## 2018-04-02 DIAGNOSIS — F41.9 ANXIETY: ICD-10-CM

## 2018-04-04 RX ORDER — VENLAFAXINE HYDROCHLORIDE 150 MG/1
CAPSULE, EXTENDED RELEASE ORAL
Qty: 30 CAP | Refills: 2 | Status: SHIPPED | OUTPATIENT
Start: 2018-04-04 | End: 2018-07-05 | Stop reason: SDUPTHER

## 2018-04-19 ENCOUNTER — OFFICE VISIT (OUTPATIENT)
Dept: NEUROLOGY | Age: 48
End: 2018-04-19

## 2018-04-19 VITALS
RESPIRATION RATE: 20 BRPM | HEART RATE: 91 BPM | BODY MASS INDEX: 22.38 KG/M2 | HEIGHT: 62 IN | DIASTOLIC BLOOD PRESSURE: 80 MMHG | WEIGHT: 121.6 LBS | SYSTOLIC BLOOD PRESSURE: 120 MMHG | OXYGEN SATURATION: 97 %

## 2018-04-19 DIAGNOSIS — M25.531 RIGHT WRIST PAIN: ICD-10-CM

## 2018-04-19 DIAGNOSIS — M79.18 MYOFASCIAL PAIN ON RIGHT SIDE: ICD-10-CM

## 2018-04-19 DIAGNOSIS — G43.719 INTRACTABLE CHRONIC MIGRAINE WITHOUT AURA AND WITHOUT STATUS MIGRAINOSUS: Primary | ICD-10-CM

## 2018-04-19 DIAGNOSIS — M54.2 NECK PAIN: ICD-10-CM

## 2018-04-19 DIAGNOSIS — S49.91XD INJURY OF RIGHT SHOULDER, SUBSEQUENT ENCOUNTER: ICD-10-CM

## 2018-04-19 RX ORDER — PREDNISONE 10 MG/1
TABLET ORAL
Qty: 42 TAB | Refills: 0 | Status: SHIPPED | OUTPATIENT
Start: 2018-04-19 | End: 2018-06-07

## 2018-04-19 NOTE — PROGRESS NOTES
Date:  18     Name:  Shiraz Mueller  :  1970  MRN:  [id number]     PCP:  Evangelist Yarbrough MD    Chief Complaint   Patient presents with    Migraine     HISTORY OF PRESENT ILLNESS: Follow up for migraines. She had one Botox procedure which did help but she has not had any subsequent procedures due to cost. Her Botox was performed by Dr. Yoshi Escalante in . Her migraines have resumed their previous pattern of daily headaches with 20-25 migraine days per month. She continues to have pain in the right shoulder, arm, and hand since her accident in 2016. She still cannot lift even a gallon of milk with the right hand without pain. She has a decreased ROM in the right shoulder. This seems to also exacerbate the headaches. Except as noted above, denies  fever, chills, cough. No CP or SOB. No dysuria, loss of bowel or bladder control. No Weight loss. Appetite good. Sleeping well. No sweats. No edema. No bruising or bleeding. No nausea or vomit. No diarrhea. No frequency, urgency, No depressive sxs. No anxiety. Denies sore throat, nasal congestion, nasal discharge, epistaxis, tinnitus, hearing loss, back pain, muscle pain, or joint pain. Current Outpatient Prescriptions   Medication Sig    venlafaxine-SR (EFFEXOR-XR) 150 mg capsule TAKE ONE CAPSULE BY MOUTH EVERY DAY    onabotulinumtoxinA (BOTOX) 200 unit injection Inject in face/neck intramuscularly in 31 FDA approves sites, every 3 months, Dx:G43.709    medroxyPROGESTERone (DEPO-PROVERA) 150 mg/mL syrg 150 mg by IntraMUSCular route once.  OTHER Gabapentin 5%, Meloxicam 0.1%, Topiramate 1% in EMLA cream   1-2 gm 3-4 x per day prn     No current facility-administered medications for this visit.       Allergies   Allergen Reactions    Iodine Other (comments)    Shellfish Containing Products Other (comments)     Past Medical History:   Diagnosis Date    Calculus of kidney     Fatigue     Migraine     Paroxysmal atrial fibrillation (H[facility])     [facility]ra[facility]ricular [facility]) ([facility] [facility] 2008    hospitalization    Vitiligo      Past Surgical History:   Procedure Laterality Date    HX OTHER SURGICAL  01/31/2017    wrist surgery, cleaned out all of the scar tissue from an old accident     [address] [facility] GLAND/CYST      [place] PELVIS/HIP JOINT SURGERY UNLISTED      left      Social History     Social History    Marital status:      Spouse name: N/A    Number of children: N/A    Years of education: N/A     Occupational History    Not on file. Social History Main Topics    Smoking status: Never Smoker    Smokeless tobacco: Never Used    Alcohol use No    Drug use: No    Sexual activity: Yes     Birth control/ protection: Pill     Other Topics Concern    Not on file     Social History Narrative     Family History   Problem Relation Age of Onset   [de-identified] Parkinsonism Father     Diabetes Father     Diabetes Brother     Cancer Mother        PHYSICAL EXAMINATION:    Visit Vitals    /80    Pulse 91    Resp 20    Ht 5' 2\" (1.575 m)    Wt 55.2 kg (121 lb 9.6 oz)    SpO2 97%    BMI 22.24 kg/m2     General: Well defined, nourished, and groomed individual in no acute distress.    Neck: Supple, nontender, no bruits, no pain with resistance to active range of motion.    Heart: Regular rate and rhythm, no murmurs, rub, or gallop. Normal S1S2.    Lungs: Clear to auscultation bilaterally with equal chest expansion, no cough, no wheeze    Musculoskeletal: Extremities revealed no edema. She does have a decrease in ROM of the right shoulder. Significant myofascial tenderness noted in the right shoulder and right upper back with noted trigger point tenderness. There is pain with movement of the right shoulder joint and there is tenderness noted around the acromioclavicular joint and the subacromial bursa.     Psych: Good mood and bright affect    NEUROLOGICAL EXAMINATION:    Mental Status: Alert and oriented to person, place, and time    Cranial Nerves:    II, III, IV, VI: Visual acuity grossly intact. Visual fields are normal.    Pupils are equal, round, and reactive to light and accommodation.    Extra-ocular movements are full and fluid. Fundoscopic exam was benign, no ptosis or nystagmus.    V-XII: Hearing is grossly intact. Facial features are symmetric, with normal sensation and strength. The palate rises symmetrically and the tongue protrudes midline. Sternocleidomastoids 5/5.    Motor Examination: Normal tone, bulk, and strength, 5/5 muscle strength throughout.    Coordination: No resting or intention tremor    Gait and Station: Steady while walking. Normal arm swing. No muscle wasting or fasiculations noted.    Reflexes: DTRs 2+ throughout. ASSESSMENT AND PLAN    ICD-10-CM ICD-9-CM    1. Intractable chronic migraine without aura and without status migrainosus G43.719 346.71 onabotulinumtoxinA (BOTOX) 200 unit injection   2. Injury of right shoulder, subsequent encounter S49. 91XD V58.89 REFERRAL TO PHYSIATRY     959.2 MRI SHOULDER RT WO CONT   3. Right wrist pain M25.531 719.43 REFERRAL TO PHYSIATRY      MRI SHOULDER RT WO CONT   4. Neck pain M54.2 723.1 REFERRAL TO PHYSIATRY      MRI SHOULDER RT WO CONT   5. Myofascial pain on right side M79.1 729.1 REFERRAL TO PHYSIATRY      MRI SHOULDER RT WO CONT     Ongoing pain in the right shoulder since a work related accident in August 2016. She does have a decrease in ROM of the right shoulder. Significant myofascial tenderness noted in the right shoulder and right upper back with noted trigger point tenderness. There is pain with movement of the right shoulder joint and there is tenderness noted around the acromioclavicular joint and the subacromial bursa. This issue could certainly contribute to the chronicity of the migraines as well. It should be noted that she did see a greater than 50% reduction in the frequency and intensity.  With the Botox, she was having 2 per week. Now, she is having 20-25 per month (4-5 per week). Recommendations:    1. MRI of the right shoulder to assess for structural pathology  2. Referral to physiatry  3. Will set up for trigger point injections for myofascial pain  4. Botox for migraine management    Follow up in four weeks after [address].  [facility]

## 2018-04-19 NOTE — PROGRESS NOTES
Migraines- they have been like normal migraines- she was out of the effexor for like 2 days and after that she had a migraines that lasted about 6 days, no ER visit she just did what she could to make it through that week  She just hadn't had the money to pay for the botox   Getting atleast 20 migraines a month, she has been using the excedrin migraine like twice a day for like the last month

## 2018-04-19 NOTE — PATIENT INSTRUCTIONS

## 2018-04-19 NOTE — MR AVS SNAPSHOT
[address] [facility] [address] [facility] [address] [phone] Patient: Stephani Segura MRN: [id number] ANP:[id number] Visit Information Date & Time Provider Department Dept. Phone Encounter #  
 4/19/2018  9:30 AM Inell Barthel, NP [facility] Neurology [facility] [phone] [id number] Upcoming Health Maintenance Date Due DTaP/Tdap/Td series (1 - Tdap) 12/31/1992 PAP AKA CERVICAL CYTOLOGY 12/31/1992 Influenza Age 5 to Adult 8/1/2017 Allergies as of 4/19/2018  Review Complete On: 4/19/2018 By: Inell Barthel, NP Severity Noted Reaction Type Reactions Iodine  12/15/2010    Other (comments) Shellfish Containing Products  12/15/2010    Other (comments) Current Immunizations  Never Reviewed No immunizations on file. Not reviewed this visit You Were Diagnosed With   
  
 Codes Comments Intractable chronic migraine without aura and without status migrainosus    -  Primary ICD-10-CM: E11.869 ICD-9-CM: 346.71 Injury of right shoulder, subsequent encounter     ICD-10-CM: S49.91XD ICD-9-CM: V58.89, 959.2 Right wrist pain     ICD-10-CM: M25.531 ICD-9-CM: 719.43 Neck pain     ICD-10-CM: M54.2 ICD-9-CM: 723.1 Myofascial pain on right side     ICD-10-CM: M79.1 ICD-9-CM: 729.1 Vitals BP Pulse Resp Height(growth percentile) Weight(growth percentile) SpO2  
 120/80 91 20 5' 2\" (1.575 m) 121 lb 9.6 oz (55.2 kg) 97% BMI OB Status Smoking Status 22.24 kg/m2 Having regular periods Never Smoker Vitals History BMI and BSA Data Body Mass Index Body Surface Area  
 22.24 kg/m 2 1.55 m 2 Preferred Pharmacy Pharmacy Name Phone [facility] [address] [facility] [phone] Your Updated Medication List  
  
   
 This list is accurate as of 18 11:41 AM.  Always use your most recent med list.  
  
  
  
  
 DEPO-PROVERA 150 mg/mL Syrg Generic drug:  medroxyPROGESTERone 150 mg by IntraMUSCular route once. onabotulinumtoxinA 200 unit injection Commonly known as:  BOTOX Inject in face/neck intramuscularly in 31 FDA approves sites, every 3 months, Dx:G43.709 OTHER Gabapentin 5%, Meloxicam 0.1%, Topiramate 1% in EMLA cream  1-2 gm 3-4 x per day prn  
  
 predniSONE 10 mg tablet Commonly known as:  DELTASONE  
6 po x2 days, 5 po x 2days, 4 po x 2days, 3 po x2days, 2 po x2days, 1 po x 2days  
  
 venlafaxine- mg capsule Commonly known as:  EFFEXOR-XR  
TAKE ONE CAPSULE BY MOUTH EVERY DAY Prescriptions Printed Refills  
 onabotulinumtoxinA (BOTOX) 200 unit injection 3 Sig: Inject in face/neck intramuscularly in 31 FDA approves sites, every 3 months, Dx:G43.709 Class: Print Prescriptions Sent to Pharmacy Refills  
 predniSONE (DELTASONE) 10 mg tablet 0 Si po x2 days, 5 po x 2days, 4 po x 2days, 3 po x2days, 2 po x2days, 1 po x 2days Class: Normal  
 Pharmacy: [place] [address] [facility] [address] #: [phone] We Performed the Following REFERRAL TO PHYSIATRY [[id number] Custom] To-Do List   
 2018 Imaging:  MRI SHOULDER RT WO CONT Referral Information Referral ID Referred By Referred To  
  
 [id number] Liss Patton MD   
   
 Visits Status Start Date End Date 1 New Request 18 If your referral has a status of pending review or denied, additional information will be sent to support the outcome of this decision. Referral ID Referred By Referred To  
 [id number] Sondra Montalvo Not Available Visits Status Start Date End Date 1 New Request 18 If your referral has a status of pending review or denied, additional information will be sent to support the outcome of this decision. Patient Instructions A Healthy Lifestyle: Care Instructions Your Care Instructions A healthy lifestyle can help you feel good, stay at a healthy weight, and have plenty of energy for both work and play. A healthy lifestyle is something you can share with your whole family. A healthy lifestyle also can lower your risk for serious health problems, such as high blood pressure, heart disease, and diabetes. You can follow a few steps listed below to improve your health and the health of your family. Follow-up care is a key part of your treatment and safety. Be sure to make and go to all appointments, and call your doctor if you are having problems. It's also a good idea to know your test results and keep a list of the medicines you take. How can you care for yourself at home? · Do not eat too much sugar, fat, or fast foods. You can still have dessert and treats now and then. The goal is moderation. · Start small to improve your eating habits. Pay attention to portion sizes, drink less juice and soda pop, and eat more fruits and vegetables. ¨ Eat a healthy amount of food. A 3-ounce serving of meat, for example, is about the size of a deck of cards. Fill the rest of your plate with vegetables and whole grains. ¨ Limit the amount of soda and sports drinks you have every day. Drink more water when you are thirsty. ¨ Eat at least 5 servings of fruits and vegetables every day. It may seem like a lot, but it is not hard to reach this goal. A serving or helping is 1 piece of fruit, 1 cup of vegetables, or 2 cups of leafy, raw vegetables. Have an apple or some carrot sticks as an afternoon snack instead of a candy bar. Try to have fruits and/or vegetables at every meal. 
· Make exercise part of your daily routine.  You may want to start with simple activities, such as walking, bicycling, or slow swimming. Try to be active 30 to 60 minutes every day. You do not need to do all 30 to 60 minutes all at once. For example, you can exercise 3 times a day for 10 or 20 minutes. Moderate exercise is safe for most people, but it is always a good idea to talk to your doctor before starting an exercise program. 
· Keep moving. Bertha Hunger the lawn, work in the garden, or [employer]. Take the stairs instead of the elevator at work. · If you smoke, quit. People who smoke have an increased risk for heart attack, stroke, cancer, and other lung illnesses. Quitting is hard, but there are ways to boost your chance of quitting tobacco for good. ¨ Use nicotine gum, patches, or lozenges. ¨ Ask your doctor about stop-smoking programs and medicines. ¨ Keep trying. In addition to reducing your risk of diseases in the future, you will notice some benefits soon after you stop using tobacco. If you have shortness of breath or asthma symptoms, they will likely get better within a few weeks after you quit. · Limit how much alcohol you drink. Moderate amounts of alcohol (up to 2 drinks a day for men, 1 drink a day for women) are okay. But drinking too much can lead to liver problems, high blood pressure, and other health problems. Family health If you have a family, there are many things you can do together to improve your health. · Eat meals together as a family as often as possible. · Eat healthy foods. This includes fruits, vegetables, lean meats and dairy, and whole grains. · Include your family in your fitness plan. Most people think of activities such as jogging or tennis as the way to fitness, but there are many ways you and your family can be more active. Anything that makes you breathe hard and gets your heart pumping is exercise. Here are some tips: 
¨ Walk to do errands or to take your child to school or the bus. ¨ Go for a family bike ride after dinner instead of watching TV. Where can you learn more? Go to http://neeta-princess.info/. Enter U170 in the search box to learn more about \"A Healthy Lifestyle: Care Instructions. \" Current as of: May 12, 2017 Content Version: 11.4 © 3160-5870 Kout. Care instructions adapted under license by [employer] (which disclaims liability or warranty for this information). If you have questions about a medical condition or this instruction, always ask your healthcare professional. [address] any warranty or liability for your use of this information. Introducing [facility] & HEALTH SERVICES! Dear [address]: 
Thank you for requesting a Pathgather account. Our records indicate that you already have an active Pathgather account. You can access your account anytime at [email or web address] Did you know that you can access your hospital and ER discharge instructions at any time in Pathgather? You can also review all of your test results from your hospital stay or ER visit. Additional Information If you have questions, please visit the Frequently Asked Questions section of the Pathgather website at [email or web address] Remember, Pathgather is NOT to be used for urgent needs. For medical emergencies, dial 911. Now available from your iPhone and Android! Please provide this summary of care documentation to your next provider. Your primary care clinician is listed as Manjinder Matiaschjensen. If you have any questions after today's visit, please call [phone].

## 2018-04-26 ENCOUNTER — TELEPHONE (OUTPATIENT)
Dept: NEUROLOGY | Age: 48
End: 2018-04-26

## 2018-04-26 NOTE — TELEPHONE ENCOUNTER
Diony PA request w/ clinical notes faxed regarding Both Botox injection [phone] and Botox     PA status  pending

## 2018-04-27 NOTE — TELEPHONE ENCOUNTER
----- Message from [facility] sent at 4/26/2018  3:39 PM EDT -----  Regarding: FUAD Tapia/Telephone  Pt [phone] says that she is scheduled to have a brain MRI done at [facility] and want to know if she can do her shoulder MRI at the same time.

## 2018-05-04 RX ORDER — DEXAMETHASONE SODIUM PHOSPHATE 10 MG/ML
10 INJECTION INTRAMUSCULAR; INTRAVENOUS ONCE
Status: DISCONTINUED | OUTPATIENT
Start: 2018-05-08 | End: 2018-05-04

## 2018-05-04 RX ORDER — DEXAMETHASONE SODIUM PHOSPHATE 10 MG/ML
10 INJECTION INTRAMUSCULAR; INTRAVENOUS ONCE
Status: COMPLETED | OUTPATIENT
Start: 2018-05-09 | End: 2018-05-09

## 2018-05-04 RX ORDER — METOCLOPRAMIDE HYDROCHLORIDE 5 MG/ML
10 INJECTION INTRAMUSCULAR; INTRAVENOUS ONCE
Status: COMPLETED | OUTPATIENT
Start: 2018-05-10 | End: 2018-05-10

## 2018-05-04 RX ORDER — METOCLOPRAMIDE HYDROCHLORIDE 5 MG/ML
10 INJECTION INTRAMUSCULAR; INTRAVENOUS ONCE
Status: DISCONTINUED | OUTPATIENT
Start: 2018-05-08 | End: 2018-05-04

## 2018-05-04 RX ORDER — METOCLOPRAMIDE HYDROCHLORIDE 5 MG/ML
10 INJECTION INTRAMUSCULAR; INTRAVENOUS ONCE
Status: COMPLETED | OUTPATIENT
Start: 2018-05-09 | End: 2018-05-09

## 2018-05-04 RX ORDER — METOCLOPRAMIDE HYDROCHLORIDE 5 MG/ML
10 INJECTION INTRAMUSCULAR; INTRAVENOUS ONCE
Status: COMPLETED | OUTPATIENT
Start: 2018-05-11 | End: 2018-05-11

## 2018-05-04 RX ORDER — DIPHENHYDRAMINE HYDROCHLORIDE 50 MG/ML
25 INJECTION, SOLUTION INTRAMUSCULAR; INTRAVENOUS ONCE
Status: COMPLETED | OUTPATIENT
Start: 2018-05-09 | End: 2018-05-09

## 2018-05-04 RX ORDER — DIPHENHYDRAMINE HYDROCHLORIDE 50 MG/ML
25 INJECTION, SOLUTION INTRAMUSCULAR; INTRAVENOUS ONCE
Status: COMPLETED | OUTPATIENT
Start: 2018-05-10 | End: 2018-05-10

## 2018-05-04 RX ORDER — DIPHENHYDRAMINE HYDROCHLORIDE 50 MG/ML
25 INJECTION, SOLUTION INTRAMUSCULAR; INTRAVENOUS ONCE
Status: DISCONTINUED | OUTPATIENT
Start: 2018-05-08 | End: 2018-05-04

## 2018-05-04 RX ORDER — DEXAMETHASONE SODIUM PHOSPHATE 10 MG/ML
10 INJECTION INTRAMUSCULAR; INTRAVENOUS ONCE
Status: COMPLETED | OUTPATIENT
Start: 2018-05-11 | End: 2018-05-11

## 2018-05-04 RX ORDER — DEXAMETHASONE SODIUM PHOSPHATE 10 MG/ML
10 INJECTION INTRAMUSCULAR; INTRAVENOUS ONCE
Status: COMPLETED | OUTPATIENT
Start: 2018-05-10 | End: 2018-05-10

## 2018-05-04 RX ORDER — DIPHENHYDRAMINE HYDROCHLORIDE 50 MG/ML
25 INJECTION, SOLUTION INTRAMUSCULAR; INTRAVENOUS ONCE
Status: COMPLETED | OUTPATIENT
Start: 2018-05-11 | End: 2018-05-11

## 2018-05-09 ENCOUNTER — HOSPITAL ENCOUNTER (OUTPATIENT)
Dept: INFUSION THERAPY | Age: 48
Discharge: HOME | End: 2018-05-09
Payer: COMMERCIAL

## 2018-05-09 VITALS
HEART RATE: 91 BPM | TEMPERATURE: 97.8 F | RESPIRATION RATE: 18 BRPM | DIASTOLIC BLOOD PRESSURE: 91 MMHG | SYSTOLIC BLOOD PRESSURE: 126 MMHG | OXYGEN SATURATION: 98 %

## 2018-05-09 LAB — HCG SERPL QL: NEGATIVE

## 2018-05-09 PROCEDURE — 74011000250 HC RX REV CODE- 250: Performed by: NURSE PRACTITIONER

## 2018-05-09 PROCEDURE — 74011250636 HC RX REV CODE- 250/636: Performed by: NURSE PRACTITIONER

## 2018-05-09 PROCEDURE — 36415 COLL VENOUS BLD VENIPUNCTURE: CPT | Performed by: PSYCHIATRY & NEUROLOGY

## 2018-05-09 PROCEDURE — 74011000258 HC RX REV CODE- 258: Performed by: NURSE PRACTITIONER

## 2018-05-09 PROCEDURE — 96365 THER/PROPH/DIAG IV INF INIT: CPT

## 2018-05-09 PROCEDURE — 96375 TX/PRO/DX INJ NEW DRUG ADDON: CPT

## 2018-05-09 PROCEDURE — 84703 CHORIONIC GONADOTROPIN ASSAY: CPT | Performed by: PSYCHIATRY & NEUROLOGY

## 2018-05-09 RX ADMIN — METOCLOPRAMIDE 10 MG: 5 INJECTION, SOLUTION INTRAMUSCULAR; INTRAVENOUS at 12:50

## 2018-05-09 RX ADMIN — SODIUM CHLORIDE 500 MG: 900 INJECTION, SOLUTION INTRAVENOUS at 14:15

## 2018-05-09 RX ADMIN — DIPHENHYDRAMINE HYDROCHLORIDE 25 MG: 50 INJECTION INTRAMUSCULAR; INTRAVENOUS at 12:53

## 2018-05-09 RX ADMIN — DEXAMETHASONE SODIUM PHOSPHATE 10 MG: 10 INJECTION, SOLUTION INTRAMUSCULAR; INTRAVENOUS at 12:53

## 2018-05-09 RX ADMIN — DIHYDROERGOTAMINE MESYLATE 1 MG: 1 INJECTION, SOLUTION INTRAMUSCULAR; INTRAVENOUS; SUBCUTANEOUS at 13:48

## 2018-05-09 NOTE — PROGRESS NOTES
Outpatient Infusion Center Short Visit Progress Note    1100 Pt admit to [facility] for 1 st Raskins ambulatory in stable condition. Assessment completed. No new concerns voiced. Pt had mild panic attack/ dizziness with iv start but resolved. During assessment, the patient reported that she had had an \"emotional breakdown\" several weeks ago and contemplated self harm but denies it today. She has not seen a MD but promised she would follow up in the next several days. Her  was at her chairside. Labs done to rule out pregnancy-negative. Visit Vitals    BP (!) 126/91    Pulse 91    Temp 97.8 °F (36.6 °C)    Resp 18    SpO2 98%    Breastfeeding No       PIV with positive blood return. Medications:  Benadryl ivp  Reglan ivp  Decadron ivp    DHE ivp  Depokote iv    1345 Pt tolerated treatment well. IV wrapped and secured for tomorrow tx due to high stress with new iv starts. D/c home ambulatory in no distress. Pt aware of next appointment scheduled for 5/10/18 .

## 2018-05-10 ENCOUNTER — HOSPITAL ENCOUNTER (OUTPATIENT)
Dept: INFUSION THERAPY | Age: 48
Discharge: HOME | End: 2018-05-10
Payer: COMMERCIAL

## 2018-05-10 ENCOUNTER — TELEPHONE (OUTPATIENT)
Dept: NEUROLOGY | Age: 48
End: 2018-05-10

## 2018-05-10 VITALS
HEART RATE: 99 BPM | TEMPERATURE: 97.9 F | RESPIRATION RATE: 18 BRPM | SYSTOLIC BLOOD PRESSURE: 124 MMHG | DIASTOLIC BLOOD PRESSURE: 85 MMHG

## 2018-05-10 PROCEDURE — 96375 TX/PRO/DX INJ NEW DRUG ADDON: CPT

## 2018-05-10 PROCEDURE — 74011250636 HC RX REV CODE- 250/636: Performed by: NURSE PRACTITIONER

## 2018-05-10 PROCEDURE — 96365 THER/PROPH/DIAG IV INF INIT: CPT

## 2018-05-10 PROCEDURE — 74011000258 HC RX REV CODE- 258: Performed by: NURSE PRACTITIONER

## 2018-05-10 PROCEDURE — 74011000250 HC RX REV CODE- 250: Performed by: NURSE PRACTITIONER

## 2018-05-10 RX ORDER — SODIUM CHLORIDE 9 MG/ML
25 INJECTION, SOLUTION INTRAVENOUS AS NEEDED
Status: DISPENSED | OUTPATIENT
Start: 2018-05-10 | End: 2018-05-11

## 2018-05-10 RX ORDER — SODIUM CHLORIDE 0.9 % (FLUSH) 0.9 %
10 SYRINGE (ML) INJECTION AS NEEDED
Status: ACTIVE | OUTPATIENT
Start: 2018-05-10 | End: 2018-05-11

## 2018-05-10 RX ADMIN — DIHYDROERGOTAMINE MESYLATE 1 MG: 1 INJECTION, SOLUTION INTRAMUSCULAR; INTRAVENOUS; SUBCUTANEOUS at 11:35

## 2018-05-10 RX ADMIN — SODIUM CHLORIDE 25 ML/HR: 900 INJECTION, SOLUTION INTRAVENOUS at 10:19

## 2018-05-10 RX ADMIN — SODIUM CHLORIDE 500 MG: 900 INJECTION, SOLUTION INTRAVENOUS at 12:00

## 2018-05-10 RX ADMIN — DEXAMETHASONE SODIUM PHOSPHATE 10 MG: 10 INJECTION, SOLUTION INTRAMUSCULAR; INTRAVENOUS at 10:24

## 2018-05-10 RX ADMIN — Medication 10 ML: at 10:19

## 2018-05-10 RX ADMIN — METOCLOPRAMIDE 10 MG: 5 INJECTION, SOLUTION INTRAMUSCULAR; INTRAVENOUS at 10:21

## 2018-05-10 RX ADMIN — DIPHENHYDRAMINE HYDROCHLORIDE 25 MG: 50 INJECTION INTRAMUSCULAR; INTRAVENOUS at 10:26

## 2018-05-10 NOTE — PROGRESS NOTES
Outpatient Infusion Center Short Visit Progress Note    1000 Pt admit to [facility] for D2 Raskins ambulatory in stable condition. Assessment completed. No new concerns voiced. RN notified MD office @ 0900 concerning \"emotional breakdown\" and thoughts of harming herself expressed yesterday. RN noted it and will report to [facility] NP. Visit Vitals    /73    Pulse 99    Temp 98.2 °F (36.8 °C)    Resp 18       PIV with positive blood return. Medications:  Reglan ivp  Decadron IVP  Benadryl ivp    DHE ivp  Depacon iv      1330 Pt tolerated treatment well. D/c home ambulatory in no distress.  Pt aware of next appointment scheduled for 5/11/18 @

## 2018-05-11 ENCOUNTER — HOSPITAL ENCOUNTER (OUTPATIENT)
Dept: INFUSION THERAPY | Age: 48
Discharge: HOME | End: 2018-05-11
Payer: COMMERCIAL

## 2018-05-11 VITALS
DIASTOLIC BLOOD PRESSURE: 80 MMHG | RESPIRATION RATE: 16 BRPM | SYSTOLIC BLOOD PRESSURE: 112 MMHG | HEART RATE: 76 BPM | TEMPERATURE: 98 F | OXYGEN SATURATION: 98 %

## 2018-05-11 PROCEDURE — 74011250636 HC RX REV CODE- 250/636: Performed by: PSYCHIATRY & NEUROLOGY

## 2018-05-11 PROCEDURE — 96375 TX/PRO/DX INJ NEW DRUG ADDON: CPT

## 2018-05-11 PROCEDURE — 74011000258 HC RX REV CODE- 258: Performed by: NURSE PRACTITIONER

## 2018-05-11 PROCEDURE — 74011250636 HC RX REV CODE- 250/636: Performed by: NURSE PRACTITIONER

## 2018-05-11 PROCEDURE — 74011000250 HC RX REV CODE- 250: Performed by: NURSE PRACTITIONER

## 2018-05-11 PROCEDURE — 96365 THER/PROPH/DIAG IV INF INIT: CPT

## 2018-05-11 RX ORDER — SODIUM CHLORIDE 9 MG/ML
25 INJECTION, SOLUTION INTRAVENOUS AS NEEDED
Status: DISPENSED | OUTPATIENT
Start: 2018-05-11 | End: 2018-05-12

## 2018-05-11 RX ORDER — SODIUM CHLORIDE 0.9 % (FLUSH) 0.9 %
10 SYRINGE (ML) INJECTION AS NEEDED
Status: ACTIVE | OUTPATIENT
Start: 2018-05-11 | End: 2018-05-12

## 2018-05-11 RX ADMIN — SODIUM CHLORIDE 25 ML/HR: 900 INJECTION, SOLUTION INTRAVENOUS at 10:51

## 2018-05-11 RX ADMIN — METOCLOPRAMIDE 10 MG: 5 INJECTION, SOLUTION INTRAMUSCULAR; INTRAVENOUS at 10:53

## 2018-05-11 RX ADMIN — DEXAMETHASONE SODIUM PHOSPHATE 10 MG: 10 INJECTION, SOLUTION INTRAMUSCULAR; INTRAVENOUS at 10:58

## 2018-05-11 RX ADMIN — SODIUM CHLORIDE 500 MG: 900 INJECTION, SOLUTION INTRAVENOUS at 11:54

## 2018-05-11 RX ADMIN — DIPHENHYDRAMINE HYDROCHLORIDE 25 MG: 50 INJECTION, SOLUTION INTRAMUSCULAR; INTRAVENOUS at 11:08

## 2018-05-11 RX ADMIN — DIHYDROERGOTAMINE MESYLATE 1 MG: 1 INJECTION, SOLUTION INTRAMUSCULAR; INTRAVENOUS; SUBCUTANEOUS at 11:51

## 2018-05-11 NOTE — PROGRESS NOTES
Outpatient Infusion Center Short Visit Progress Note    1010 Pt admit to [facility] for D 3 Raskins ambulatory in stable condition. Assessment completed. No new concerns voiced. Blood pressure 119/81, pulse 88, temperature 97.9 °F (36.6 °C), resp. rate 18, SpO2 98 %. PIV with positive blood return. Medications:  Reglan ivp  Benadryl ivp  Decadron ivp    DHE IVP  Depacon IV    1330 Pt tolerated treatment well. D/c home ambulatory in no distress. Pt aware no further appointments scheduled at this time.

## 2018-05-16 ENCOUNTER — TELEPHONE (OUTPATIENT)
Dept: NEUROLOGY | Age: 48
End: 2018-05-16

## 2018-05-16 NOTE — TELEPHONE ENCOUNTER
Jenifer Lopez  From scheduling is calling to speak with the nurse patient needs a peer to peer for the MRI

## 2018-05-22 ENCOUNTER — TELEPHONE (OUTPATIENT)
Dept: NEUROLOGY | Age: 48
End: 2018-05-22

## 2018-05-22 NOTE — TELEPHONE ENCOUNTER
Laisha from [address] [place] [address] called and said she wanted to speak with you regarding a peer to peer for her Mri of the shoulder.

## 2018-05-24 NOTE — TELEPHONE ENCOUNTER
Called and spoke to [facility] for clinical review for more conservative treatments  Can do [facility] [phone]

## 2018-06-01 NOTE — TELEPHONE ENCOUNTER
MRI of the shoulder was approved to be done between June 1-30, 2018. Authorization #[id number]. Please have her scheduled for the MRI ASAP     Message was sent to central scheduling to be done ASAP!

## 2018-06-03 ENCOUNTER — HOSPITAL ENCOUNTER (OUTPATIENT)
Dept: MRI IMAGING | Age: 48
Discharge: HOME | End: 2018-06-03
Payer: COMMERCIAL

## 2018-06-03 DIAGNOSIS — S49.91XD INJURY OF RIGHT SHOULDER, SUBSEQUENT ENCOUNTER: ICD-10-CM

## 2018-06-03 DIAGNOSIS — M79.18 MYOFASCIAL PAIN ON RIGHT SIDE: ICD-10-CM

## 2018-06-03 DIAGNOSIS — M54.2 NECK PAIN: ICD-10-CM

## 2018-06-03 DIAGNOSIS — M25.531 RIGHT WRIST PAIN: ICD-10-CM

## 2018-06-03 PROCEDURE — 73221 MRI JOINT UPR EXTREM W/O DYE: CPT

## 2018-06-07 ENCOUNTER — OFFICE VISIT (OUTPATIENT)
Dept: NEUROLOGY | Age: 48
End: 2018-06-07

## 2018-06-07 ENCOUNTER — TELEPHONE (OUTPATIENT)
Dept: NEUROLOGY | Age: 48
End: 2018-06-07

## 2018-06-07 VITALS
DIASTOLIC BLOOD PRESSURE: 86 MMHG | BODY MASS INDEX: 22.26 KG/M2 | HEIGHT: 62 IN | HEART RATE: 92 BPM | WEIGHT: 121 LBS | RESPIRATION RATE: 20 BRPM | OXYGEN SATURATION: 98 % | SYSTOLIC BLOOD PRESSURE: 122 MMHG

## 2018-06-07 DIAGNOSIS — G43.719 INTRACTABLE CHRONIC MIGRAINE WITHOUT AURA AND WITHOUT STATUS MIGRAINOSUS: Primary | ICD-10-CM

## 2018-06-07 DIAGNOSIS — R90.89 ABNORMAL BRAIN MRI: ICD-10-CM

## 2018-06-07 RX ORDER — EPINEPHRINE 0.3 MG/.3ML
INJECTION SUBCUTANEOUS
Refills: 5 | COMMUNITY
Start: 2018-05-21

## 2018-06-07 NOTE — MR AVS SNAPSHOT
[address] [place] [address]  [facility] [address] [facility] [address] [phone] Patient: Jenna Maria MRN: [id number] UF Visit Information Date & Time Provider Department Dept. Phone Encounter #  
 2018  3:00 PM Blanca Castro NP [facility] Neurology [facility] [phone] [id number] Upcoming Health Maintenance Date Due DTaP/Tdap/Td series (1 - Tdap) 1992 PAP AKA CERVICAL CYTOLOGY 1992 Influenza Age 5 to Adult 2018 Allergies as of 2018  Review Complete On: 2018 By: Blanca Castro NP Severity Noted Reaction Type Reactions Iodine  12/15/2010    Other (comments) Shellfish Containing Products  12/15/2010    Other (comments) Current Immunizations  Reviewed on 2018 Name Date Influenza Vaccine 2018 Not reviewed this visit You Were Diagnosed With   
  
 Codes Comments Intractable chronic migraine without aura and without status migrainosus    -  Primary ICD-10-CM: V78.326 ICD-9-CM: 346.71 Abnormal brain MRI     ICD-10-CM: R90.89 ICD-9-CM: 793.0 Vitals BP Pulse Resp Height(growth percentile) Weight(growth percentile) SpO2  
 122/86 92 20 5' 2\" (1.575 m) 121 lb (54.9 kg) 98% BMI OB Status Smoking Status 22.13 kg/m2 Having regular periods Never Smoker Vitals History BMI and BSA Data Body Mass Index Body Surface Area  
 22.13 kg/m 2 1.55 m 2 Preferred Pharmacy Pharmacy Name Phone [facility]  [facility] [phone] Your Updated Medication List  
  
   
This list is accurate as of 18  4:07 PM.  Always use your most recent med list.  
  
  
  
  
 DEPO-PROVERA 150 mg/mL Syrg Generic drug:  medroxyPROGESTERone 150 mg by IntraMUSCular route once. EPINEPHrine 0.3 mg/0.3 mL injection Commonly known as:  EPIPEN  
  
 onabotulinumtoxinA 200 unit injection Commonly known as:  BOTOX Inject in face/neck intramuscularly in 31 FDA approves sites, every 3 months, Dx:G43.709 OTHER Gabapentin 5%, Meloxicam 0.1%, Topiramate 1% in EMLA cream  1-2 gm 3-4 x per day prn  
  
 venlafaxine- mg capsule Commonly known as:  EFFEXOR-XR  
TAKE ONE CAPSULE BY MOUTH EVERY DAY To-Do List   
 06/07/2018 Imaging:  MRI CERV SPINE W WO CONT Referral Information Referral ID Referred By Referred To  
  
 [id number] Eugene Montana Not Available Visits Status Start Date End Date 1 New Request 6/7/18 6/7/19 If your referral has a status of pending review or denied, additional information will be sent to support the outcome of this decision. Patient Instructions A Healthy Lifestyle: Care Instructions Your Care Instructions A healthy lifestyle can help you feel good, stay at a healthy weight, and have plenty of energy for both work and play. A healthy lifestyle is something you can share with your whole family. A healthy lifestyle also can lower your risk for serious health problems, such as high blood pressure, heart disease, and diabetes. You can follow a few steps listed below to improve your health and the health of your family. Follow-up care is a key part of your treatment and safety. Be sure to make and go to all appointments, and call your doctor if you are having problems. It's also a good idea to know your test results and keep a list of the medicines you take. How can you care for yourself at home? · Do not eat too much sugar, fat, or fast foods. You can still have dessert and treats now and then. The goal is moderation. · Start small to improve your eating habits. Pay attention to portion sizes, drink less juice and soda pop, and eat more fruits and vegetables. ¨ Eat a healthy amount of food.  A 3-ounce serving of meat, for example, is about the size of a deck of cards. Fill the rest of your plate with vegetables and whole grains. ¨ Limit the amount of soda and sports drinks you have every day. Drink more water when you are thirsty. ¨ Eat at least 5 servings of fruits and vegetables every day. It may seem like a lot, but it is not hard to reach this goal. A serving or helping is 1 piece of fruit, 1 cup of vegetables, or 2 cups of leafy, raw vegetables. Have an apple or some carrot sticks as an afternoon snack instead of a candy bar. Try to have fruits and/or vegetables at every meal. 
· Make exercise part of your daily routine. You may want to start with simple activities, such as walking, bicycling, or slow swimming. Try to be active 30 to 60 minutes every day. You do not need to do all 30 to 60 minutes all at once. For example, you can exercise 3 times a day for 10 or 20 minutes. Moderate exercise is safe for most people, but it is always a good idea to talk to your doctor before starting an exercise program. 
· Keep moving. Altagracia Lights the lawn, work in the garden, or [employer]. Take the stairs instead of the elevator at work. · If you smoke, quit. People who smoke have an increased risk for heart attack, stroke, cancer, and other lung illnesses. Quitting is hard, but there are ways to boost your chance of quitting tobacco for good. ¨ Use nicotine gum, patches, or lozenges. ¨ Ask your doctor about stop-smoking programs and medicines. ¨ Keep trying. In addition to reducing your risk of diseases in the future, you will notice some benefits soon after you stop using tobacco. If you have shortness of breath or asthma symptoms, they will likely get better within a few weeks after you quit. · Limit how much alcohol you drink. Moderate amounts of alcohol (up to 2 drinks a day for men, 1 drink a day for women) are okay. But drinking too much can lead to liver problems, high blood pressure, and other health problems. Family health If you have a family, there are many things you can do together to improve your health. · Eat meals together as a family as often as possible. · Eat healthy foods. This includes fruits, vegetables, lean meats and dairy, and whole grains. · Include your family in your fitness plan. Most people think of activities such as jogging or tennis as the way to fitness, but there are many ways you and your family can be more active. Anything that makes you breathe hard and gets your heart pumping is exercise. Here are some tips: 
¨ Walk to do errands or to take your child to school or the bus. ¨ Go for a family bike ride after dinner instead of watching TV. Where can you learn more? Go to http://neeta-princess.info/. Enter H530 in the search box to learn more about \"A Healthy Lifestyle: Care Instructions. \" Current as of: May 12, 2017 Content Version: 11.4 © 0139-3131 ilustrum. Care instructions adapted under license by [employer] (which disclaims liability or warranty for this information). If you have questions about a medical condition or this instruction, always ask your healthcare professional. [place] [address] any warranty or liability for your use of this information. Introducing \[facility]\"" & HEALTH SERVICES! Dear Cleo Morales: 
Thank you for requesting a Berry White account. Our records indicate that you already have an active Berry White account. You can access your account anytime at [email or web address] Did you know that you can access your hospital and ER discharge instructions at any time in Berry White? You can also review all of your test results from your hospital stay or ER visit. Additional Information If you have questions, please visit the Frequently Asked Questions section of the Berry White website at [email or web address] Remember, Berry White is NOT to be used for urgent needs.  For medical emergencies, dial 911. Now available from your iPhone and Android! Please provide this summary of care documentation to your next provider. Your primary care clinician is listed as Lindsey Munguia. If you have any questions after today's visit, please call [phone].

## 2018-06-07 NOTE — PATIENT INSTRUCTIONS

## 2018-06-07 NOTE — PROGRESS NOTES
Migraines- she doesn't qualify for the assistance and her copay is $165/every 3 months for the botox   The Gustavo did help with the horrible migraines she was having from last time to where she could atleast function, she just has a slight migraine today     MRI of the brain she had done at [facility]
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
## 2018-06-24 ENCOUNTER — HOSPITAL ENCOUNTER (OUTPATIENT)
Dept: MRI IMAGING | Age: 48
Discharge: HOME | End: 2018-06-24
Payer: COMMERCIAL

## 2018-06-24 VITALS — BODY MASS INDEX: 22.45 KG/M2 | WEIGHT: 122 LBS | HEIGHT: 62 IN

## 2018-06-24 DIAGNOSIS — R90.89 ABNORMAL BRAIN MRI: ICD-10-CM

## 2018-06-24 PROCEDURE — A9575 INJ GADOTERATE MEGLUMI 0.1ML: HCPCS | Performed by: RADIOLOGY

## 2018-06-24 PROCEDURE — 74011250636 HC RX REV CODE- 250/636: Performed by: RADIOLOGY

## 2018-06-24 PROCEDURE — 72156 MRI NECK SPINE W/O & W/DYE: CPT

## 2018-06-24 RX ORDER — GADOTERATE MEGLUMINE 376.9 MG/ML
10 INJECTION INTRAVENOUS
Status: COMPLETED | OUTPATIENT
Start: 2018-06-24 | End: 2018-06-24

## 2018-06-24 RX ADMIN — GADOTERATE MEGLUMINE 10 ML: 376.9 INJECTION INTRAVENOUS at 12:29

## 2018-07-05 DIAGNOSIS — F41.9 ANXIETY: ICD-10-CM

## 2018-07-05 DIAGNOSIS — G43.709 CHRONIC MIGRAINE WITHOUT AURA WITHOUT STATUS MIGRAINOSUS, NOT INTRACTABLE: ICD-10-CM

## 2018-07-05 RX ORDER — VENLAFAXINE HYDROCHLORIDE 150 MG/1
CAPSULE, EXTENDED RELEASE ORAL
Qty: 30 CAP | Refills: 0 | Status: SHIPPED | OUTPATIENT
Start: 2018-07-05 | End: 2018-08-02 | Stop reason: SDUPTHER

## 2018-08-02 DIAGNOSIS — F41.9 ANXIETY: ICD-10-CM

## 2018-08-02 DIAGNOSIS — G43.709 CHRONIC MIGRAINE WITHOUT AURA WITHOUT STATUS MIGRAINOSUS, NOT INTRACTABLE: ICD-10-CM

## 2018-08-02 RX ORDER — VENLAFAXINE HYDROCHLORIDE 150 MG/1
CAPSULE, EXTENDED RELEASE ORAL
Qty: 30 CAP | Refills: 0 | Status: SHIPPED | OUTPATIENT
Start: 2018-08-02 | End: 2018-09-02 | Stop reason: SDUPTHER

## 2018-09-02 DIAGNOSIS — G43.709 CHRONIC MIGRAINE WITHOUT AURA WITHOUT STATUS MIGRAINOSUS, NOT INTRACTABLE: ICD-10-CM

## 2018-09-02 DIAGNOSIS — F41.9 ANXIETY: ICD-10-CM

## 2018-09-04 RX ORDER — VENLAFAXINE HYDROCHLORIDE 150 MG/1
CAPSULE, EXTENDED RELEASE ORAL
Qty: 30 CAP | Refills: 0 | Status: SHIPPED | OUTPATIENT
Start: 2018-09-04 | End: 2018-10-04 | Stop reason: SDUPTHER

## 2018-09-27 ENCOUNTER — TELEPHONE (OUTPATIENT)
Dept: NEUROLOGY | Age: 48
End: 2018-09-27

## 2018-09-27 NOTE — TELEPHONE ENCOUNTER
----- Message from Laura Costa sent at 9/27/2018 11:28 AM EDT -----  Regarding: Logan Fitzgerald NP/Telephone  The patient is requesting a call back from the NP to discuss scheduling an MRI before her surgery on 10/9/18. [phone]

## 2018-10-02 ENCOUNTER — HOSPITAL ENCOUNTER (OUTPATIENT)
Dept: PREADMISSION TESTING | Age: 48
Discharge: HOME | End: 2018-10-02
Payer: COMMERCIAL

## 2018-10-02 VITALS
OXYGEN SATURATION: 98 % | HEART RATE: 102 BPM | DIASTOLIC BLOOD PRESSURE: 94 MMHG | TEMPERATURE: 98.1 F | RESPIRATION RATE: 18 BRPM | SYSTOLIC BLOOD PRESSURE: 130 MMHG | BODY MASS INDEX: 21.56 KG/M2 | HEIGHT: 63 IN | WEIGHT: 121.69 LBS

## 2018-10-02 LAB
ABO + RH BLD: NORMAL
ALBUMIN SERPL-MCNC: 3.8 G/DL (ref 3.5–5)
ALBUMIN/GLOB SERPL: 1.4 {RATIO} (ref 1.1–2.2)
ALP SERPL-CCNC: 90 U/L (ref 45–117)
ALT SERPL-CCNC: 28 U/L (ref 12–78)
ANION GAP SERPL CALC-SCNC: 12 MMOL/L (ref 5–15)
APPEARANCE UR: ABNORMAL
APTT PPP: 26.1 SEC (ref 22.1–32)
AST SERPL-CCNC: 13 U/L (ref 15–37)
BACTERIA URNS QL MICRO: NEGATIVE /HPF
BASOPHILS # BLD: 0 K/UL (ref 0–0.1)
BASOPHILS NFR BLD: 1 % (ref 0–1)
BILIRUB SERPL-MCNC: 0.3 MG/DL (ref 0.2–1)
BILIRUB UR QL: NEGATIVE
BLOOD GROUP ANTIBODIES SERPL: NORMAL
BUN SERPL-MCNC: 13 MG/DL (ref 6–20)
BUN/CREAT SERPL: 18 (ref 12–20)
CALCIUM SERPL-MCNC: 9.2 MG/DL (ref 8.5–10.1)
CHLORIDE SERPL-SCNC: 107 MMOL/L (ref 97–108)
CO2 SERPL-SCNC: 23 MMOL/L (ref 21–32)
COLOR UR: ABNORMAL
CREAT SERPL-MCNC: 0.72 MG/DL (ref 0.55–1.02)
DIFFERENTIAL METHOD BLD: NORMAL
EOSINOPHIL # BLD: 0 K/UL (ref 0–0.4)
EOSINOPHIL NFR BLD: 1 % (ref 0–7)
EPITH CASTS URNS QL MICRO: ABNORMAL /LPF
ERYTHROCYTE [DISTWIDTH] IN BLOOD BY AUTOMATED COUNT: 13 % (ref 11.5–14.5)
EST. AVERAGE GLUCOSE BLD GHB EST-MCNC: 108 MG/DL
GLOBULIN SER CALC-MCNC: 2.7 G/DL (ref 2–4)
GLUCOSE SERPL-MCNC: 118 MG/DL (ref 65–100)
GLUCOSE UR STRIP.AUTO-MCNC: 100 MG/DL
HBA1C MFR BLD: 5.4 % (ref 4.2–6.3)
HCT VFR BLD AUTO: 40.1 % (ref 35–47)
HGB BLD-MCNC: 13.5 G/DL (ref 11.5–16)
HGB UR QL STRIP: NEGATIVE
IMM GRANULOCYTES # BLD: 0 K/UL (ref 0–0.04)
IMM GRANULOCYTES NFR BLD AUTO: 0 % (ref 0–0.5)
INR PPP: 1 (ref 0.9–1.1)
KETONES UR QL STRIP.AUTO: NEGATIVE MG/DL
LEUKOCYTE ESTERASE UR QL STRIP.AUTO: ABNORMAL
LYMPHOCYTES # BLD: 0.8 K/UL (ref 0.8–3.5)
LYMPHOCYTES NFR BLD: 18 % (ref 12–49)
MCH RBC QN AUTO: 29.9 PG (ref 26–34)
MCHC RBC AUTO-ENTMCNC: 33.7 G/DL (ref 30–36.5)
MCV RBC AUTO: 88.7 FL (ref 80–99)
MONOCYTES # BLD: 0.3 K/UL (ref 0–1)
MONOCYTES NFR BLD: 6 % (ref 5–13)
NEUTS SEG # BLD: 3.4 K/UL (ref 1.8–8)
NEUTS SEG NFR BLD: 75 % (ref 32–75)
NITRITE UR QL STRIP.AUTO: NEGATIVE
NRBC # BLD: 0 K/UL (ref 0–0.01)
NRBC BLD-RTO: 0 PER 100 WBC
PH UR STRIP: 6 [PH] (ref 5–8)
PLATELET # BLD AUTO: 195 K/UL (ref 150–400)
PMV BLD AUTO: 10.4 FL (ref 8.9–12.9)
POTASSIUM SERPL-SCNC: 3.7 MMOL/L (ref 3.5–5.1)
PROT SERPL-MCNC: 6.5 G/DL (ref 6.4–8.2)
PROT UR STRIP-MCNC: NEGATIVE MG/DL
PROTHROMBIN TIME: 10.2 SEC (ref 9–11.1)
RBC # BLD AUTO: 4.52 M/UL (ref 3.8–5.2)
RBC #/AREA URNS HPF: ABNORMAL /HPF (ref 0–5)
SODIUM SERPL-SCNC: 142 MMOL/L (ref 136–145)
SP GR UR REFRACTOMETRY: 1.02 (ref 1–1.03)
SPECIMEN EXP DATE BLD: NORMAL
THERAPEUTIC RANGE,PTTT: NORMAL SECS (ref 58–77)
UA: UC IF INDICATED,UAUC: ABNORMAL
UROBILINOGEN UR QL STRIP.AUTO: 1 EU/DL (ref 0.2–1)
WBC # BLD AUTO: 4.6 K/UL (ref 3.6–11)
WBC URNS QL MICRO: ABNORMAL /HPF (ref 0–4)

## 2018-10-02 PROCEDURE — 86900 BLOOD TYPING SEROLOGIC ABO: CPT | Performed by: ORTHOPAEDIC SURGERY

## 2018-10-02 PROCEDURE — 85025 COMPLETE CBC W/AUTO DIFF WBC: CPT | Performed by: ORTHOPAEDIC SURGERY

## 2018-10-02 PROCEDURE — 80053 COMPREHEN METABOLIC PANEL: CPT | Performed by: ORTHOPAEDIC SURGERY

## 2018-10-02 PROCEDURE — 81001 URINALYSIS AUTO W/SCOPE: CPT | Performed by: ORTHOPAEDIC SURGERY

## 2018-10-02 PROCEDURE — 83036 HEMOGLOBIN GLYCOSYLATED A1C: CPT | Performed by: ORTHOPAEDIC SURGERY

## 2018-10-02 PROCEDURE — 85610 PROTHROMBIN TIME: CPT | Performed by: ORTHOPAEDIC SURGERY

## 2018-10-02 PROCEDURE — 85730 THROMBOPLASTIN TIME PARTIAL: CPT | Performed by: ORTHOPAEDIC SURGERY

## 2018-10-02 PROCEDURE — 36415 COLL VENOUS BLD VENIPUNCTURE: CPT | Performed by: ORTHOPAEDIC SURGERY

## 2018-10-02 RX ORDER — IBUPROFEN 200 MG
400 TABLET ORAL
COMMUNITY
End: 2018-10-10

## 2018-10-02 RX ORDER — CEPHALEXIN 500 MG/1
500 CAPSULE ORAL 2 TIMES DAILY
COMMUNITY
End: 2019-01-28 | Stop reason: ALTCHOICE

## 2018-10-02 RX ORDER — SERTRALINE HYDROCHLORIDE 25 MG/1
25 TABLET, FILM COATED ORAL
COMMUNITY
End: 2019-04-30 | Stop reason: ALTCHOICE

## 2018-10-02 RX ORDER — CLONAZEPAM 0.5 MG/1
0.5 TABLET ORAL
COMMUNITY
End: 2019-01-28

## 2018-10-02 RX ORDER — NAPROXEN SODIUM 220 MG
220 TABLET ORAL
COMMUNITY
End: 2018-10-10
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
## 2018-10-02 NOTE — PERIOP NOTES
INSTRUCTIONS [address] [address] [address] MAIN OR [phone] MAIN PRE OP [phone] AMBULATORY PRE OP [phone] PRE-ADMISSION TESTING 21  Surgery Date:   Tuesday 10/9/18 Is surgery arrival time given by surgeon? NO If [facility] staff will call you between 3 and 7pm the day before your surgery with your arrival time. (If your surgery is on a Monday, we will call you the Friday before.) Call [phone] after 7pm Monday-Friday if you did not receive your arrival time. Answers to Common Questions When You 
Arrive Arrive at the [address] [facility] on the day of your surgery Have your insurance card, photo ID, and any copayment (if needed) Food 
 and  
Drink NO food or drink after midnight the night before surgery This means NO water, gum, mints, coffee, juice, etc. 
No alcohol (beer, wine, liquor) 24 hours before and after surgery Medicine to TAKE Morning of Surgery MEDICATIONS TO TAKE THE MORNING OF SURGERY WITH A SIP OF WATER:  
? Venlafaxine, zoloft, clonazepam 
  
Medicine To 
STOP  
FOR PAIN 
? You can take Tylenol  follow instructions on the bottle 
? NO Aspirin for pain, excedrin ? NO Non-Steroidal Anti-Inflammatory Drugs (NSAIDs:  
for example, Ibuprofen (Advil, Motrin), Naproxen (Aleve) ? STOP herbal supplements and vitamins 1 week before surgery Blood Thinners ? If you take Aspirin, Plavix, Coumadin, blood-thinning or anti-clot medicine, talk to your surgeon and/or follow the instructions from the doctor who told you to take that medicine Clothing Jewelry Valuables Bathing CLOTHING 
? Wear loose, comfortable clothes ? Wear glasses instead of contacts ? Leave money, jewelry and valuables at home ? No make-up, particularly mascara, the day of surgery ? REMOVE ALL piercings, rings, and jewelry - leave at home ? Wear hair loose or down; no pony-tails, buns, or metal hair clips BATHING 
? Follow all special bath instructions (for total joint replacement, spine and bowel surgeries.) ? If you shower the morning of surgery, please do not apply any lotions, powders, or deodorants afterwards. Do not shave or trim anywhere 24 hours before surgery. Going Home 
or Spending the Night  
? SAME-DAY SURGERY: You must have a responsible adult drive you home and stay with you 24 hours after surgery ? ADMITS: If your doctor is keeping you into the hospital after surgery, leave personal belongings/luggage in your car until you have a hospital room number. Hospital discharge time is 12 noon Drivers must be here before 12 noon unless you are told differently Follow all instructions so your surgery wont be cancelled. Please, be on time. If a situation occurs and you are delayed the day of surgery, call [phone] or [phone]. If your physical condition changes (like a fever, cold, flu, etc.) call your surgeon as soon as possible. The [facility] staff can be reached at [phone]. OTHER SPECIAL INSTRUCTIONS:  16.  Special Instructions: · Use Chlorhexidine Care Fusion wash and sponges 3 days prior to surgery as instructed. · Incentive spirometer given with instructions to practice at home and bring back to the hospital on the day of surgery. · [facility] will contact you if your Hemoglobin A1C is greater than 7.5. · Ensure/Glucerna  sample, nutritional information, and Ensure/Glucerna coupon given. · Pain pamphlet and Call Don't Fall reminder reviewed with patient. ·  parking is complimentary Monday - Friday 7 am - 5 pm 
· Bring PTA Medication list day of surgery with the last doses taken documented Do not bring medication bottles the day of surgery The patient was contacted  in person. She  verbalize  understanding of all instructions and does not  need reinforcement.

## 2018-10-03 LAB
BACTERIA SPEC CULT: NORMAL
BACTERIA SPEC CULT: NORMAL
SERVICE CMNT-IMP: NORMAL

## 2018-10-04 DIAGNOSIS — F41.9 ANXIETY: ICD-10-CM

## 2018-10-04 DIAGNOSIS — G43.709 CHRONIC MIGRAINE WITHOUT AURA WITHOUT STATUS MIGRAINOSUS, NOT INTRACTABLE: ICD-10-CM

## 2018-10-04 RX ORDER — VENLAFAXINE HYDROCHLORIDE 150 MG/1
CAPSULE, EXTENDED RELEASE ORAL
Qty: 90 CAP | Refills: 0 | Status: SHIPPED | OUTPATIENT
Start: 2018-10-04 | End: 2018-12-27 | Stop reason: SDUPTHER

## 2018-10-04 NOTE — PERIOP NOTES
MRSA negative. Called cardiology office again to request copy of clearance letter- they state it was faxed to PAT and they have confirmation that it was received. Note not on chart. Called surgeon's office and LM for Mira Heck to please fax copy of clearance to PAT. Also called [facility] and LM to please fax copy of office note from 10/03/2018 to PAT.

## 2018-10-05 NOTE — PERIOP NOTES
Spoke to Severo Hanlon at Dr. Harman Souza office requesting the cardiac clearance note be faxed to the [facility] . Lety to send. DOS: 10/8/2018

## 2018-10-08 ENCOUNTER — ANESTHESIA EVENT (OUTPATIENT)
Dept: SURGERY | Age: 48
End: 2018-10-08
Payer: COMMERCIAL

## 2018-10-08 NOTE — PERIOP NOTES
[facility] and placed on paper chart. As of 10/3/2018's visit the patient's right great toe has \"healed well with no signs of infection. \"  DOS: 10/9/2018

## 2018-10-09 ENCOUNTER — ANESTHESIA (OUTPATIENT)
Dept: SURGERY | Age: 48
End: 2018-10-09
Payer: COMMERCIAL

## 2018-10-09 ENCOUNTER — HOSPITAL ENCOUNTER (OUTPATIENT)
Age: 48
Setting detail: OBSERVATION
Discharge: HOME | End: 2018-10-10
Attending: ORTHOPAEDIC SURGERY | Admitting: ORTHOPAEDIC SURGERY
Payer: COMMERCIAL

## 2018-10-09 ENCOUNTER — APPOINTMENT (OUTPATIENT)
Dept: GENERAL RADIOLOGY | Age: 48
End: 2018-10-09
Attending: ORTHOPAEDIC SURGERY
Payer: COMMERCIAL

## 2018-10-09 DIAGNOSIS — M48.02 CERVICAL STENOSIS OF SPINAL CANAL: Primary | ICD-10-CM

## 2018-10-09 LAB — HCG UR QL: NEGATIVE

## 2018-10-09 PROCEDURE — 77030018846 HC SOL IRR STRL H20 ICUM -A: Performed by: ORTHOPAEDIC SURGERY

## 2018-10-09 PROCEDURE — 74011250636 HC RX REV CODE- 250/636: Performed by: ANESTHESIOLOGY

## 2018-10-09 PROCEDURE — 77030037302 HC SPCR CERV LORDTC INLC -G: Performed by: ORTHOPAEDIC SURGERY

## 2018-10-09 PROCEDURE — 74011000272 HC RX REV CODE- 272: Performed by: ORTHOPAEDIC SURGERY

## 2018-10-09 PROCEDURE — 76010000162 HC OR TIME 1.5 TO 2 HR INTENSV-TIER 1: Performed by: ORTHOPAEDIC SURGERY

## 2018-10-09 PROCEDURE — 77030019908 HC STETH ESOPH SIMS -A: Performed by: ANESTHESIOLOGY

## 2018-10-09 PROCEDURE — 74011250636 HC RX REV CODE- 250/636: Performed by: ORTHOPAEDIC SURGERY

## 2018-10-09 PROCEDURE — 77030026438 HC STYL ET INTUB CARD -A: Performed by: ANESTHESIOLOGY

## 2018-10-09 PROCEDURE — 74011000250 HC RX REV CODE- 250

## 2018-10-09 PROCEDURE — 99218 HC RM OBSERVATION: CPT

## 2018-10-09 PROCEDURE — 74011250636 HC RX REV CODE- 250/636

## 2018-10-09 PROCEDURE — 77030004391 HC BUR FLUT MEDT -C: Performed by: ORTHOPAEDIC SURGERY

## 2018-10-09 PROCEDURE — 77030020782 HC GWN BAIR PAWS FLX 3M -B

## 2018-10-09 PROCEDURE — 77030018836 HC SOL IRR NACL ICUM -A: Performed by: ORTHOPAEDIC SURGERY

## 2018-10-09 PROCEDURE — 81025 URINE PREGNANCY TEST: CPT

## 2018-10-09 PROCEDURE — 74011000250 HC RX REV CODE- 250: Performed by: ANESTHESIOLOGY

## 2018-10-09 PROCEDURE — C1713 ANCHOR/SCREW BN/BN,TIS/BN: HCPCS | Performed by: ORTHOPAEDIC SURGERY

## 2018-10-09 PROCEDURE — 77030018673: Performed by: ORTHOPAEDIC SURGERY

## 2018-10-09 PROCEDURE — 74011250637 HC RX REV CODE- 250/637: Performed by: ORTHOPAEDIC SURGERY

## 2018-10-09 PROCEDURE — 77030011267 HC ELECTRD BLD COVD -A: Performed by: ORTHOPAEDIC SURGERY

## 2018-10-09 PROCEDURE — 77030032490 HC SLV COMPR SCD KNE COVD -B: Performed by: ORTHOPAEDIC SURGERY

## 2018-10-09 PROCEDURE — 77030008684 HC TU ET CUF COVD -B: Performed by: ANESTHESIOLOGY

## 2018-10-09 PROCEDURE — 77030030102 HC BIT DRL PYRNES K2M -B: Performed by: ORTHOPAEDIC SURGERY

## 2018-10-09 PROCEDURE — 77030013079 HC BLNKT BAIR HGGR 3M -A: Performed by: ANESTHESIOLOGY

## 2018-10-09 PROCEDURE — 76210000002 HC OR PH I REC 3 TO 3.5 HR: Performed by: ORTHOPAEDIC SURGERY

## 2018-10-09 PROCEDURE — 76001 XR FLUOROSCOPY OVER 60 MINUTES: CPT

## 2018-10-09 PROCEDURE — 77030003666 HC NDL SPINAL BD -A: Performed by: ORTHOPAEDIC SURGERY

## 2018-10-09 PROCEDURE — 77030029099 HC BN WAX SSPC -A: Performed by: ORTHOPAEDIC SURGERY

## 2018-10-09 PROCEDURE — 77030031139 HC SUT VCRL2 J&J -A: Performed by: ORTHOPAEDIC SURGERY

## 2018-10-09 PROCEDURE — 77030012406 HC DRN WND PENRS BARD -A: Performed by: ORTHOPAEDIC SURGERY

## 2018-10-09 PROCEDURE — 74011000250 HC RX REV CODE- 250: Performed by: ORTHOPAEDIC SURGERY

## 2018-10-09 PROCEDURE — 77030002933 HC SUT MCRYL J&J -A: Performed by: ORTHOPAEDIC SURGERY

## 2018-10-09 PROCEDURE — 76060000034 HC ANESTHESIA 1.5 TO 2 HR: Performed by: ORTHOPAEDIC SURGERY

## 2018-10-09 PROCEDURE — 77030011640 HC PAD GRND REM COVD -A: Performed by: ORTHOPAEDIC SURGERY

## 2018-10-09 DEVICE — IMPLANTABLE DEVICE: Type: IMPLANTABLE DEVICE | Site: SPINE CERVICAL | Status: FUNCTIONAL

## 2018-10-09 DEVICE — SCREW SPNL L12MM DIA4MM CERV ST CONSTRN LO PROF TIFIX LCK: Type: IMPLANTABLE DEVICE | Site: SPINE CERVICAL | Status: FUNCTIONAL

## 2018-10-09 DEVICE — PLATE SPNL L18MM BILAT ANTR CERV TI 1 LEV CONSTRN LO PROF: Type: IMPLANTABLE DEVICE | Site: SPINE CERVICAL | Status: FUNCTIONAL

## 2018-10-09 RX ORDER — SODIUM CHLORIDE 0.9 % (FLUSH) 0.9 %
5-10 SYRINGE (ML) INJECTION EVERY 8 HOURS
Status: DISCONTINUED | OUTPATIENT
Start: 2018-10-10 | End: 2018-10-10 | Stop reason: HOSPADM

## 2018-10-09 RX ORDER — METOPROLOL TARTRATE 5 MG/5ML
5 INJECTION INTRAVENOUS ONCE
Status: COMPLETED | OUTPATIENT
Start: 2018-10-09 | End: 2018-10-09

## 2018-10-09 RX ORDER — DEXAMETHASONE SODIUM PHOSPHATE 4 MG/ML
INJECTION, SOLUTION INTRA-ARTICULAR; INTRALESIONAL; INTRAMUSCULAR; INTRAVENOUS; SOFT TISSUE AS NEEDED
Status: DISCONTINUED | OUTPATIENT
Start: 2018-10-09 | End: 2018-10-09 | Stop reason: HOSPADM

## 2018-10-09 RX ORDER — SODIUM CHLORIDE 0.9 % (FLUSH) 0.9 %
5-10 SYRINGE (ML) INJECTION AS NEEDED
Status: DISCONTINUED | OUTPATIENT
Start: 2018-10-09 | End: 2018-10-10 | Stop reason: HOSPADM

## 2018-10-09 RX ORDER — POLYETHYLENE GLYCOL 3350 17 G/17G
17 POWDER, FOR SOLUTION ORAL DAILY
Status: DISCONTINUED | OUTPATIENT
Start: 2018-10-10 | End: 2018-10-10 | Stop reason: HOSPADM

## 2018-10-09 RX ORDER — AMOXICILLIN 250 MG
1 CAPSULE ORAL 2 TIMES DAILY
Status: DISCONTINUED | OUTPATIENT
Start: 2018-10-10 | End: 2018-10-10 | Stop reason: HOSPADM

## 2018-10-09 RX ORDER — HYDROMORPHONE HYDROCHLORIDE 1 MG/ML
.25-1 INJECTION, SOLUTION INTRAMUSCULAR; INTRAVENOUS; SUBCUTANEOUS
Status: DISCONTINUED | OUTPATIENT
Start: 2018-10-09 | End: 2018-10-09 | Stop reason: HOSPADM

## 2018-10-09 RX ORDER — NALOXONE HYDROCHLORIDE 0.4 MG/ML
0.2 INJECTION, SOLUTION INTRAMUSCULAR; INTRAVENOUS; SUBCUTANEOUS
Status: DISCONTINUED | OUTPATIENT
Start: 2018-10-09 | End: 2018-10-09 | Stop reason: HOSPADM

## 2018-10-09 RX ORDER — HYDROMORPHONE HCL IN 0.9% NACL 15 MG/30ML
PATIENT CONTROLLED ANALGESIA VIAL INTRAVENOUS
Status: DISCONTINUED | OUTPATIENT
Start: 2018-10-09 | End: 2018-10-10

## 2018-10-09 RX ORDER — SODIUM CHLORIDE, SODIUM LACTATE, POTASSIUM CHLORIDE, CALCIUM CHLORIDE 600; 310; 30; 20 MG/100ML; MG/100ML; MG/100ML; MG/100ML
125 INJECTION, SOLUTION INTRAVENOUS CONTINUOUS
Status: DISCONTINUED | OUTPATIENT
Start: 2018-10-09 | End: 2018-10-09 | Stop reason: HOSPADM

## 2018-10-09 RX ORDER — ACETAMINOPHEN 325 MG/1
650 TABLET ORAL
Status: DISCONTINUED | OUTPATIENT
Start: 2018-10-09 | End: 2018-10-10 | Stop reason: HOSPADM

## 2018-10-09 RX ORDER — FAMOTIDINE 20 MG/1
20 TABLET, FILM COATED ORAL 2 TIMES DAILY
Status: DISCONTINUED | OUTPATIENT
Start: 2018-10-09 | End: 2018-10-10 | Stop reason: HOSPADM

## 2018-10-09 RX ORDER — ROCURONIUM BROMIDE 10 MG/ML
INJECTION, SOLUTION INTRAVENOUS AS NEEDED
Status: DISCONTINUED | OUTPATIENT
Start: 2018-10-09 | End: 2018-10-09 | Stop reason: HOSPADM

## 2018-10-09 RX ORDER — OXYCODONE HYDROCHLORIDE 5 MG/1
10 TABLET ORAL
Status: DISCONTINUED | OUTPATIENT
Start: 2018-10-09 | End: 2018-10-10 | Stop reason: HOSPADM

## 2018-10-09 RX ORDER — CYCLOBENZAPRINE HCL 10 MG
10 TABLET ORAL
Status: DISCONTINUED | OUTPATIENT
Start: 2018-10-09 | End: 2018-10-10 | Stop reason: HOSPADM

## 2018-10-09 RX ORDER — LIDOCAINE HYDROCHLORIDE 10 MG/ML
0.1 INJECTION, SOLUTION EPIDURAL; INFILTRATION; INTRACAUDAL; PERINEURAL AS NEEDED
Status: DISCONTINUED | OUTPATIENT
Start: 2018-10-09 | End: 2018-10-09 | Stop reason: HOSPADM

## 2018-10-09 RX ORDER — NEOSTIGMINE METHYLSULFATE 1 MG/ML
INJECTION INTRAVENOUS AS NEEDED
Status: DISCONTINUED | OUTPATIENT
Start: 2018-10-09 | End: 2018-10-09 | Stop reason: HOSPADM

## 2018-10-09 RX ORDER — VENLAFAXINE HYDROCHLORIDE 150 MG/1
150 CAPSULE, EXTENDED RELEASE ORAL
Status: DISCONTINUED | OUTPATIENT
Start: 2018-10-10 | End: 2018-10-10 | Stop reason: HOSPADM

## 2018-10-09 RX ORDER — SODIUM CHLORIDE 9 MG/ML
125 INJECTION, SOLUTION INTRAVENOUS CONTINUOUS
Status: DISPENSED | OUTPATIENT
Start: 2018-10-09 | End: 2018-10-10

## 2018-10-09 RX ORDER — SODIUM CHLORIDE 0.9 % (FLUSH) 0.9 %
5-10 SYRINGE (ML) INJECTION AS NEEDED
Status: DISCONTINUED | OUTPATIENT
Start: 2018-10-09 | End: 2018-10-09 | Stop reason: HOSPADM

## 2018-10-09 RX ORDER — OXYCODONE HYDROCHLORIDE 5 MG/1
5 TABLET ORAL
Status: DISCONTINUED | OUTPATIENT
Start: 2018-10-09 | End: 2018-10-10 | Stop reason: HOSPADM

## 2018-10-09 RX ORDER — CEFAZOLIN SODIUM/WATER 2 G/20 ML
2 SYRINGE (ML) INTRAVENOUS
Status: DISCONTINUED | OUTPATIENT
Start: 2018-10-09 | End: 2018-10-09 | Stop reason: HOSPADM

## 2018-10-09 RX ORDER — EPHEDRINE SULFATE 50 MG/ML
INJECTION, SOLUTION INTRAVENOUS AS NEEDED
Status: DISCONTINUED | OUTPATIENT
Start: 2018-10-09 | End: 2018-10-09 | Stop reason: HOSPADM

## 2018-10-09 RX ORDER — SODIUM CHLORIDE 0.9 % (FLUSH) 0.9 %
5-10 SYRINGE (ML) INJECTION EVERY 8 HOURS
Status: DISCONTINUED | OUTPATIENT
Start: 2018-10-09 | End: 2018-10-09 | Stop reason: HOSPADM

## 2018-10-09 RX ORDER — CEFAZOLIN SODIUM/WATER 2 G/20 ML
2 SYRINGE (ML) INTRAVENOUS EVERY 8 HOURS
Status: COMPLETED | OUTPATIENT
Start: 2018-10-09 | End: 2018-10-10

## 2018-10-09 RX ORDER — ONDANSETRON 2 MG/ML
4 INJECTION INTRAMUSCULAR; INTRAVENOUS
Status: DISCONTINUED | OUTPATIENT
Start: 2018-10-09 | End: 2018-10-10 | Stop reason: HOSPADM

## 2018-10-09 RX ORDER — MIDAZOLAM HYDROCHLORIDE 1 MG/ML
INJECTION, SOLUTION INTRAMUSCULAR; INTRAVENOUS AS NEEDED
Status: DISCONTINUED | OUTPATIENT
Start: 2018-10-09 | End: 2018-10-09 | Stop reason: HOSPADM

## 2018-10-09 RX ORDER — FACIAL-BODY WIPES
10 EACH TOPICAL DAILY PRN
Status: DISCONTINUED | OUTPATIENT
Start: 2018-10-11 | End: 2018-10-10 | Stop reason: HOSPADM

## 2018-10-09 RX ORDER — NALOXONE HYDROCHLORIDE 0.4 MG/ML
0.4 INJECTION, SOLUTION INTRAMUSCULAR; INTRAVENOUS; SUBCUTANEOUS AS NEEDED
Status: DISCONTINUED | OUTPATIENT
Start: 2018-10-09 | End: 2018-10-10 | Stop reason: HOSPADM

## 2018-10-09 RX ORDER — ONDANSETRON 2 MG/ML
INJECTION INTRAMUSCULAR; INTRAVENOUS AS NEEDED
Status: DISCONTINUED | OUTPATIENT
Start: 2018-10-09 | End: 2018-10-09 | Stop reason: HOSPADM

## 2018-10-09 RX ORDER — SUCCINYLCHOLINE CHLORIDE 20 MG/ML
INJECTION INTRAMUSCULAR; INTRAVENOUS AS NEEDED
Status: DISCONTINUED | OUTPATIENT
Start: 2018-10-09 | End: 2018-10-09 | Stop reason: HOSPADM

## 2018-10-09 RX ORDER — FLUMAZENIL 0.1 MG/ML
0.2 INJECTION INTRAVENOUS
Status: DISCONTINUED | OUTPATIENT
Start: 2018-10-09 | End: 2018-10-09 | Stop reason: HOSPADM

## 2018-10-09 RX ORDER — SERTRALINE HYDROCHLORIDE 25 MG/1
25 TABLET, FILM COATED ORAL
Status: DISCONTINUED | OUTPATIENT
Start: 2018-10-10 | End: 2018-10-10 | Stop reason: HOSPADM

## 2018-10-09 RX ORDER — GLYCOPYRROLATE 0.2 MG/ML
INJECTION INTRAMUSCULAR; INTRAVENOUS AS NEEDED
Status: DISCONTINUED | OUTPATIENT
Start: 2018-10-09 | End: 2018-10-09 | Stop reason: HOSPADM

## 2018-10-09 RX ORDER — LIDOCAINE HYDROCHLORIDE 20 MG/ML
INJECTION, SOLUTION EPIDURAL; INFILTRATION; INTRACAUDAL; PERINEURAL AS NEEDED
Status: DISCONTINUED | OUTPATIENT
Start: 2018-10-09 | End: 2018-10-09 | Stop reason: HOSPADM

## 2018-10-09 RX ORDER — HYDROMORPHONE HYDROCHLORIDE 2 MG/ML
0.5 INJECTION, SOLUTION INTRAMUSCULAR; INTRAVENOUS; SUBCUTANEOUS
Status: DISCONTINUED | OUTPATIENT
Start: 2018-10-09 | End: 2018-10-10 | Stop reason: HOSPADM

## 2018-10-09 RX ORDER — CLONAZEPAM 0.5 MG/1
0.5 TABLET ORAL
Status: DISCONTINUED | OUTPATIENT
Start: 2018-10-09 | End: 2018-10-10 | Stop reason: HOSPADM

## 2018-10-09 RX ORDER — NALBUPHINE HYDROCHLORIDE 10 MG/ML
5 INJECTION, SOLUTION INTRAMUSCULAR; INTRAVENOUS; SUBCUTANEOUS
Status: DISCONTINUED | OUTPATIENT
Start: 2018-10-09 | End: 2018-10-10 | Stop reason: HOSPADM

## 2018-10-09 RX ORDER — PROPOFOL 10 MG/ML
INJECTION, EMULSION INTRAVENOUS AS NEEDED
Status: DISCONTINUED | OUTPATIENT
Start: 2018-10-09 | End: 2018-10-09 | Stop reason: HOSPADM

## 2018-10-09 RX ORDER — FENTANYL CITRATE 50 UG/ML
INJECTION, SOLUTION INTRAMUSCULAR; INTRAVENOUS AS NEEDED
Status: DISCONTINUED | OUTPATIENT
Start: 2018-10-09 | End: 2018-10-09 | Stop reason: HOSPADM

## 2018-10-09 RX ORDER — CEFAZOLIN SODIUM 1 G/3ML
INJECTION, POWDER, FOR SOLUTION INTRAMUSCULAR; INTRAVENOUS AS NEEDED
Status: DISCONTINUED | OUTPATIENT
Start: 2018-10-09 | End: 2018-10-09 | Stop reason: HOSPADM

## 2018-10-09 RX ADMIN — HYDROMORPHONE HYDROCHLORIDE 0.5 MG: 1 INJECTION, SOLUTION INTRAMUSCULAR; INTRAVENOUS; SUBCUTANEOUS at 10:14

## 2018-10-09 RX ADMIN — SODIUM CHLORIDE 125 ML/HR: 900 INJECTION, SOLUTION INTRAVENOUS at 12:28

## 2018-10-09 RX ADMIN — SODIUM CHLORIDE 125 ML/HR: 900 INJECTION, SOLUTION INTRAVENOUS at 18:35

## 2018-10-09 RX ADMIN — METOPROLOL TARTRATE 5 MG: 5 INJECTION, SOLUTION INTRAVENOUS at 12:25

## 2018-10-09 RX ADMIN — MIDAZOLAM HYDROCHLORIDE 2 MG: 1 INJECTION, SOLUTION INTRAMUSCULAR; INTRAVENOUS at 07:35

## 2018-10-09 RX ADMIN — OXYCODONE HYDROCHLORIDE 10 MG: 5 TABLET ORAL at 19:53

## 2018-10-09 RX ADMIN — BENZOCAINE, MENTHOL 1 LOZENGE: 15; 3.6 LOZENGE ORAL at 14:22

## 2018-10-09 RX ADMIN — CEFAZOLIN SODIUM 2 G: 1 INJECTION, POWDER, FOR SOLUTION INTRAMUSCULAR; INTRAVENOUS at 07:55

## 2018-10-09 RX ADMIN — DEXAMETHASONE SODIUM PHOSPHATE 8 MG: 4 INJECTION, SOLUTION INTRA-ARTICULAR; INTRALESIONAL; INTRAMUSCULAR; INTRAVENOUS; SOFT TISSUE at 08:00

## 2018-10-09 RX ADMIN — FENTANYL CITRATE 50 MCG: 50 INJECTION, SOLUTION INTRAMUSCULAR; INTRAVENOUS at 08:09

## 2018-10-09 RX ADMIN — BENZOCAINE, MENTHOL 1 LOZENGE: 15; 3.6 LOZENGE ORAL at 22:55

## 2018-10-09 RX ADMIN — SODIUM CHLORIDE, SODIUM LACTATE, POTASSIUM CHLORIDE, AND CALCIUM CHLORIDE 125 ML/HR: 600; 310; 30; 20 INJECTION, SOLUTION INTRAVENOUS at 06:41

## 2018-10-09 RX ADMIN — HYDROMORPHONE HYDROCHLORIDE 0.5 MG: 1 INJECTION, SOLUTION INTRAMUSCULAR; INTRAVENOUS; SUBCUTANEOUS at 10:00

## 2018-10-09 RX ADMIN — ONDANSETRON 4 MG: 2 INJECTION INTRAMUSCULAR; INTRAVENOUS at 08:02

## 2018-10-09 RX ADMIN — FENTANYL CITRATE 50 MCG: 50 INJECTION, SOLUTION INTRAMUSCULAR; INTRAVENOUS at 09:02

## 2018-10-09 RX ADMIN — MIDAZOLAM HYDROCHLORIDE 3 MG: 1 INJECTION, SOLUTION INTRAMUSCULAR; INTRAVENOUS at 07:39

## 2018-10-09 RX ADMIN — HYDROMORPHONE HYDROCHLORIDE 0.5 MG: 1 INJECTION, SOLUTION INTRAMUSCULAR; INTRAVENOUS; SUBCUTANEOUS at 09:32

## 2018-10-09 RX ADMIN — SUCCINYLCHOLINE CHLORIDE 100 MG: 20 INJECTION INTRAMUSCULAR; INTRAVENOUS at 07:40

## 2018-10-09 RX ADMIN — Medication 2 G: at 22:55

## 2018-10-09 RX ADMIN — OXYCODONE HYDROCHLORIDE 10 MG: 5 TABLET ORAL at 14:23

## 2018-10-09 RX ADMIN — PROPOFOL 150 MG: 10 INJECTION, EMULSION INTRAVENOUS at 07:40

## 2018-10-09 RX ADMIN — LIDOCAINE HYDROCHLORIDE 50 MG: 20 INJECTION, SOLUTION EPIDURAL; INFILTRATION; INTRACAUDAL; PERINEURAL at 07:40

## 2018-10-09 RX ADMIN — CLONAZEPAM 0.5 MG: 0.5 TABLET ORAL at 22:55

## 2018-10-09 RX ADMIN — Medication: at 09:50

## 2018-10-09 RX ADMIN — FENTANYL CITRATE 100 MCG: 50 INJECTION, SOLUTION INTRAMUSCULAR; INTRAVENOUS at 07:40

## 2018-10-09 RX ADMIN — SODIUM CHLORIDE, SODIUM LACTATE, POTASSIUM CHLORIDE, AND CALCIUM CHLORIDE: 600; 310; 30; 20 INJECTION, SOLUTION INTRAVENOUS at 08:48

## 2018-10-09 RX ADMIN — Medication 2 G: at 14:18

## 2018-10-09 RX ADMIN — EPHEDRINE SULFATE 15 MG: 50 INJECTION, SOLUTION INTRAVENOUS at 08:25

## 2018-10-09 RX ADMIN — GLYCOPYRROLATE 0.5 MG: 0.2 INJECTION INTRAMUSCULAR; INTRAVENOUS at 08:51

## 2018-10-09 RX ADMIN — FENTANYL CITRATE 50 MCG: 50 INJECTION, SOLUTION INTRAMUSCULAR; INTRAVENOUS at 09:12

## 2018-10-09 RX ADMIN — EPHEDRINE SULFATE 10 MG: 50 INJECTION, SOLUTION INTRAVENOUS at 08:28

## 2018-10-09 RX ADMIN — FAMOTIDINE 20 MG: 20 TABLET ORAL at 17:02

## 2018-10-09 RX ADMIN — NEOSTIGMINE METHYLSULFATE 3 MG: 1 INJECTION INTRAVENOUS at 08:51

## 2018-10-09 RX ADMIN — CLONAZEPAM 0.5 MG: 0.5 TABLET ORAL at 10:23

## 2018-10-09 RX ADMIN — OXYCODONE HYDROCHLORIDE 10 MG: 5 TABLET ORAL at 22:55

## 2018-10-09 RX ADMIN — ROCURONIUM BROMIDE 5 MG: 10 INJECTION, SOLUTION INTRAVENOUS at 07:40

## 2018-10-09 NOTE — IP AVS SNAPSHOT
[address] [facility] [address] 
 
 
 [address] [facility] 100[address] [facility] 
[phone] Patient: Renie Landau MRN: [id number] About your hospitalization You were admitted on:  October 9, 2018 You last received care in the:  [facility] 4M POST SURG ORT 1 You were discharged on:  October 10, 2018 Why you were hospitalized Your primary diagnosis was:  Not on File Your diagnoses also included:  Cervical Stenosis Of Spinal Canal  
  
Follow-up Information Follow up With Details Comments Contact Info Angelique Lauren MD   [address] [facility] [address] 
[phone] [facility] On 11/1/2018 8:45am - follow-up appt. [address] [facility] [address] 50 [facility] 
[phone] Discharge Orders None A check marilee indicates which time of day the medication should be taken. My Medications START taking these medications Instructions Each Dose to Equal  
 Morning Noon Evening Bedtime  
 docusate sodium 100 mg capsule Commonly known as:  Padmini Estrella Your next dose is:  10/10/2018 6:00PM  
   
 Take 1 Cap by mouth two (2) times a day for 30 days. 100 mg  
    
   
   
   
  
 naloxone 4 mg/actuation nasal spray Commonly known as:  [employer] Use 1 spray intranasally, then discard. Repeat with new spray every 2 min as needed for opioid overdose symptoms, alternating nostrils. oxyCODONE IR 5 mg immediate release tablet Commonly known as:  Damaris Viramontes Your last dose was:  5mg on 10/10/2018 12:00PM  
Your next dose is:  10/10/2018 Can take as early as 4:00PM   
   
 Take 1-2 Tabs by mouth every four (4) hours as needed. Max Daily Amount: 60 mg.  
 5-10 mg CONTINUE taking these medications Instructions Each Dose to Equal  
 Morning Noon Evening Bedtime AIMOVIG [employer]OIN[employer]OR SC Your next dose is:  Resume home schedule by SubCUTAneous route. Monthly injection  
     
   
   
   
  
 cephALEXin 500 mg capsule Commonly known as:  Macario Cespedes Your next dose is:  Resume home schedule Take 500 mg by mouth two (2) times a day. 500 mg  
    
   
   
   
  
 clonazePAM 0.5 mg tablet Commonly known as:  Zedavid Solis Your last dose was:  0.5 mg on 10/9/2018 10:55 PM  
   
 Take 0.5 mg by mouth three (3) times daily as needed. 0.5 mg  
    
   
   
   
  
 DEPO-PROVERA 150 mg/mL Syrg Generic drug:  medroxyPROGESTERone Your next dose is:  Resume home schedule 150 mg by IntraMUSCular route every three (3) months. 150 mg  
    
   
   
   
  
 EPINEPHrine 0.3 mg/0.3 mL injection Commonly known as:  EPIPEN  
   
      
   
   
   
  
 onabotulinumtoxinA 200 unit injection Commonly known as:  BOTOX Your next dose is:  Resume home schedule Inject in face/neck intramuscularly in 31 FDA approves sites, every 3 months, Dx:G43.709  
     
   
   
   
  
 venlafaxine- mg capsule Commonly known as:  EFFEXOR-XR Your last dose was:  150 mg on 10/10/2018  8:13 AM  
Your next dose is:  10/11/2018 TAKE 1 CAPSULE BY MOUTH EVERY DAY  
     
   
   
   
  
 ZOLOFT 25 mg tablet Generic drug:  sertraline Your last dose was:  25 mg on 10/10/2018  6:37 AM  
Your next dose is:  10/11/2018 Take 25 mg by mouth every morning. 25 mg  
    
   
   
   
  
  
STOP taking these medications ALEVE 220 mg tablet Generic drug:  naproxen sodium EXCEDRIN MIGRAINE PO  
   
  
 ibuprofen 200 mg tablet Commonly known as:  MOTRIN  
   
  
 OTHER Where to Get Your Medications Information on where to get these meds will be given to you by the nurse or doctor. ! Ask your nurse or doctor about these medications  
  docusate sodium 100 mg capsule  
 naloxone 4 mg/actuation nasal spray  
 oxyCODONE IR 5 mg immediate release tablet Opioid Education Prescription Opioids: What You Need to Know: 
 
Prescription opioids can be used to help relieve moderate-to-severe pain and are often prescribed following a surgery or injury, or for certain health conditions. These medications can be an important part of treatment but also come with serious risks. Opioids are strong pain medicines. Examples include hydrocodone, oxycodone, fentanyl, and morphine. Heroin is an example of an illegal opioid. It is important to work with your health care provider to make sure you are getting the safest, most effective care. WHAT ARE THE RISKS AND SIDE EFFECTS OF OPIOID USE? Prescription opioids carry serious risks of addiction and overdose, especially with prolonged use. An opioid overdose, often marked by slow breathing, can cause sudden death. The use of prescription opioids can have a number of side effects as well, even when taken as directed. · Tolerance-meaning you might need to take more of a medication for the same pain relief · Physical dependence-meaning you have symptoms of withdrawal when the medication is stopped. Withdrawal symptoms can include nausea, sweating, chills, diarrhea, stomach cramps, and muscle aches. Withdrawal can last up to several weeks, depending on which drug you took and how long you took it. · Increased sensitivity to pain · Constipation · Nausea, vomiting, and dry mouth · Sleepiness and dizziness · Confusion · Depression · Low levels of testosterone that can result in lower sex drive, energy, and strength · Itching and sweating RISKS ARE GREATER WITH:      
· History of drug misuse, substance use disorder, or overdose · Mental health conditions (such as depression or anxiety) · Sleep apnea · Older age (72 years or older) · Pregnancy Avoid alcohol while taking prescription opioids. Also, unless specifically advised by your health care provider, medications to avoid include: · Benzodiazepines (such as Xanax or Valium) · Muscle relaxants (such as Soma or Flexeril) · Hypnotics (such as Ambien or Lunesta) · Other prescription opioids KNOW YOUR OPTIONS Talk to your health care provider about ways to manage your pain that don't involve prescription opioids. Some of these options may actually work better and have fewer risks and side effects. Consult your physician before adding or stopping any medications, treatments, or physical activity. Options may include: 
· Pain relievers such as acetaminophen, ibuprofen, and naproxen · Some medications that are also used for depression or seizures · Physical therapy and exercise · Counseling to help patients learn how to cope better with triggers of pain and stress. · Application of heat or cold compress · Massage therapy · Relaxation techniques Be Informed Make sure you know the name of your medication, how much and how often to take it, and its potential risks & side effects. IF YOU ARE PRESCRIBED OPIOIDS FOR PAIN: 
· Never take opioids in greater amounts or more often than prescribed. Remember the goal is not to be pain-free but to manage your pain at a tolerable level. · Follow up with your primary care provider to: · Work together to create a plan on how to manage your pain. · Talk about ways to help manage your pain that don't involve prescription opioids. · Talk about any and all concerns and side effects. · Help prevent misuse and abuse. · Never sell or share prescription opioids · Help prevent misuse and abuse. · Store prescription opioids in a secure place and out of reach of others (this may include visitors, children, friends, and family). · Safely dispose of unused/unwanted prescription opioids: Find your community drug take-back program or your pharmacy mail-back program, or flush them down the toilet, following guidance from the Food and Drug Administration (www.fda.gov/Drugs/ResourcesForYou). · Visit www.cdc.gov/drugoverdose to learn about the risks of opioid abuse and overdose. · If you believe you may be struggling with addiction, tell your health care provider and ask for guidance or call [address] at 5-240-038-XNZY. Discharge Instructions Carlotta Boswell MD 
 [place] Office Phone: [phone] Neck Surgery Discharge Instructions Activities ? You are going home a well person, be as active as possible. Your only exercise should be walking. Start with short frequent walks and increase your walking distance each day. Start with walking twice a day for 5 minutes and increase your distance each day 2-3 minutes until you reach 25 minutes twice a day. Limit the amount of time you sit to 20-30 minute intervals. Sitting for prolonged periods of time will be uncomfortable for you following your surgery. ? Do not lift anything over five pounds, and do not do any bending or straining. ? Avoid reaching overhead in this post-operative period ? Do not do any neck exercises until you have been instructed by your doctor. ? When you are in the bed, you may lay on your back or on either side. Do not lie on your stomach. ? Continue using your incentive spirometer regularly for deep breathing exercises ? You may resume sexual relations 3-4 weeks after your surgery, depending on how you are feeling. Diet ? You may resume your normal diet. If your throat is sore, you may want to eat soft foods for a few days. Be sure to drink plenty of fluids, it is important to keep yourself hydrated. If you begin having trouble swallowing, call the office immediately. ? Avoid alcoholic beverages and ABSOLUTELY NO tobacco products. Tobacco products will interfere with your healing. If you continue to use tobacco, you may end up needing another surgery in the future. Medications ? Do not take anti-inflammatory medications or aspirin unless instructed by your physician. ? Take your pain medication as directed. ? Do NOT take additional Tylenol if your prescribed pain medication has acetaminophen in it (Endocet/Percocet, Lortab, Norco). ? It is important to have regular bowel movements. Pain medications may cause constipation. Stool softeners, prune juice, and increasing your water and fiber intake may help in preventing constipation. ? Do NOT take laxatives if at all possible except in severe situations. It can results in a vicious cycle of constipation and diarrhea. ? Do not be alarmed if you still have some of the same symptoms you had prior to surgery. The nerves often require time to heal after the pressure has been relieved. You may experience pain in your shoulders or between your shoulder blades, which is common after this surgery. The level of pain you experience should improve as your body heals. Driving ? You may not drive or return to work until instructed by your physician. However, you may ride in the car for short periods of time. Neck collar ? Wear your neck brace. You may remove it for short breaks, when eating or showering. You must keep the brace on while sleeping and when ambulating. Showering ? You may shower in approximately 5 days after your surgery if your incision is not draining. ? You may remove your brace during showers. ? Do not rub or apply lotion or ointments to the incision site. ? Do not use tub baths, swimming pools or Jacuzzis. Caring for your incision ? Keep the clear, plastic dressing on until 3 days after surgery. At that point, if the incision is dry and without drainage, you may keep the wound open to air without cover. ? You may have steri-strips on your incision (small, white pieces of tape).   Do not pull the steri-strips; they will fall off on their own after several days. If you have sutures or staples, they will be removed by home health or when you see your physician. ? Do not rub or apply any lotions or ointments to your incision site. Follow Up 
? Once you are home, call your physicians office to schedule an appointment 2-3 weeks after surgery. Notify your physician if you develop any of the following conditions: 
? Fever above 101 degrees for 24 hours. ? Nausea or vomiting. ? Severe headache. 
? Inability to urinate. ? Loss of bowel or bladder control (sudden onset of incontinence). ? Changes in sensation in your extremities (numbness, tingling, loss of color). ? Severe pain or pain not relieved by medications. ? Redness, swelling, or drainage from your incision. ? Persistent pain in the chest.  
? Pain in the calf of either leg. 
? Increased weakness (if this is greater than before your surgery). If you have any questions, contact your Orthopaedic Surgeons office. OFFICE OF DR. Juvencio Resendiz   [phone] [place] [address], [address], [address] [facility], [address] [facility] [address] * WEAR YOUR BRACE AS ADVISED * NO DRIVING UNTIL YOU ARE CLEARED TO DO SO BY YOUR SURGEON 
 
* LIMIT LIFTING, BENDING AND TWISTING. NO LIFTING MORE THAN 5 LBS * MAKE SURE YOU ARE GETTING GOOD NUTRITION (Lean Protein, Vitamin D AND Calcium) * DO NOT TAKE ANY NSAIDs FOR THE FIRST 3 MONTHS AFTER SURGERY (such as Advil/Ibuprofen/Motrin, Aleve/Naproxen/Naprosyn, Diclofenac, Celebrex, Meloxicam, Indomethacin, Goody's powder, BC powder etc.) * NO NICOTINE PRODUCTS * FULLY READ YOUR DISCHARGE INSTRUCTIONS Introducing [facility]! Dear Jeff Flanagan: 
Thank you for requesting a [employer] account. Our records indicate that you already have an active [employer] account. You can access your account anytime at [email or web address] Did you know that you can access your hospital and ER discharge instructions at any time in [employer]? You can also review all of your test results from your hospital stay or ER visit. Additional Information If you have questions, please visit the Frequently Asked Questions section of the [employer] website at [email or web address] Remember, [employer] is NOT to be used for urgent needs. For medical emergencies, dial 911. Now available from your iPhone and Android! Introducing Silviano Camejo As a [employer] patient, I wanted to make you aware of our electronic visit tool called Silviano Camejo. [email or web address] allows you to connect within minutes with a medical provider 24 hours a day, seven days a week via a mobile device or tablet or logging into a secure website from your computer. You can access Silviano Camejo from anywhere in the [place]. A virtual visit might be right for you when you have a simple condition and feel like you just dont want to get out of bed, or cant get away from work for an appointment, when your regular [employer] [employer] provider is not available (evenings, weekends or holidays), or when youre out of town and need minor care. Electronic visits cost only $49 and if the [email or web address] provider determines a prescription is needed to treat your condition, one can be electronically transmitted to a nearby pharmacy*. Please take a moment to enroll today if you have not already done so. The enrollment process is free and takes just a few minutes. To enroll, please download the [employer]ie [employer]ck 24/7 megan to your tablet or phone, or visit [email or web address] to enroll on your computer. And, as an [address] [place] [address] patient with a [employer] account, the results of your visits will be scanned into your electronic medical record and your primary care provider will be able to view the scanned results.    
We urge you to continue to see your regular [employer] provider for your ongoing medical care. And while your primary care provider may not be the one available when you seek a Silviano Iqbaljodiefin virtual visit, the peace of mind you get from getting a real diagnosis real time can be priceless. For more information on Silviano Iqbaljodiefin, view our Frequently Asked Questions (FAQs) at [email or web address]. Sincerely, 
 
Eden Elizabeth MD 
Chief Medical Officer Ramila Mccord *:  certain medications cannot be prescribed via Silviano Iqbalelsie Providers Seen During Your Hospitalization Provider Specialty Primary office phone Yony Pagan MD Orthopedic Surgery [phone] Your Primary Care Physician (PCP) Primary Care Physician Office Phone Office Fax [address]37 [address] [phone] You are allergic to the following Allergen Reactions Benadryl (Diphenhydramine Hcl) Other (comments) Able to tolerate IV benadryl but oral benadryl causes her to feel like \"things are coming out of my hands\" Iodine Swelling Face/hands Shellfish Containing Products Swelling Face Sting, Bee Swelling Recent Documentation Height Weight BMI OB Status Smoking Status 1.575 m 54.9 kg 22.13 kg/m2 Injection Never Smoker Emergency Contacts Name Discharge Info Relation Home Work Mobile [facility] CAREGIVER [3] Spouse [3] [phone] Patient Belongings The following personal items are in your possession at time of discharge: 
  Dental Appliances: None  Visual Aid: Glasses, With patient      Home Medications: None   Jewelry: None  Clothing: Footwear, Pants, Shirt, Undergarments    Other Valuables: None Please provide this summary of care documentation to your next provider. Signatures-by signing, you are acknowledging that this After Visit Summary has been reviewed with you and you have received a copy. Patient Signature:  ____________________________________________________________ Date:  ____________________________________________________________  
  
Carley Journey Provider Signature:  ____________________________________________________________ Date:  ____________________________________________________________

## 2018-10-09 NOTE — PROGRESS NOTES
Bedside shift change report given to Rosendo Ovalle RN (oncoming nurse) by Seema Mukherjee RN (offgoing nurse). Report included the following information SBAR, Kardex and MAR.

## 2018-10-09 NOTE — ANESTHESIA POSTPROCEDURE EVALUATION
Post-Anesthesia Evaluation and Assessment Patient: Tomas Hunt MRN: [id number]  SSN: [id number] YOB: 1970  Age: 56 y.o. Sex: female Cardiovascular Function/Vital Signs Visit Vitals  /79  Pulse (!) 110  Temp 36.6 °C (97.8 °F)  Resp 14  
 Ht 5' 2\" (1.575 m)  Wt 54.9 kg (121 lb)  SpO2 100%  BMI 22.13 kg/m2 Patient is status post general anesthesia for Procedure(s): 
C4-C5 ANTERIOR CERVICAL DISCECTOMY AND FUSION WITH INSTRUMENTATION. Nausea/Vomiting: None Postoperative hydration reviewed and adequate. Pain: 
Pain Scale 1: Numeric (0 - 10) (10/09/18 0932) Pain Intensity 1: 8 (10/09/18 0932) Managed Neurological Status:  
Neuro (WDL): Exceptions to WDL (10/09/18 0920) Neuro Neurologic State: Drowsy;Sleeping;Eyes open to stimulus (10/09/18 0920) Orientation Level: Unable to verbalize (10/09/18 0920) At baseline Mental Status and Level of Consciousness: Arousable Pulmonary Status:  
O2 Device: Nasal cannula;Oral airway (10/09/18 0921) Adequate oxygenation and airway patent Complications related to anesthesia: None Post-anesthesia assessment completed. No concerns Signed By: Scott Lee MD   
 October 9, 2018

## 2018-10-09 NOTE — OP NOTES
[address] [facility], [address] [address]    OPERATIVE REPORT      NAME: Ashely Hopkins    AGE: 56 y.o. YOB: 1970    MEDICAL RECORD NUMBER: [id number]    DATE OF SURGERY: 10/9/2018    OPERATIVE REPORT     PREOPERATIVE DIAGNOSIS: Cervical stenosis     POSTOPERATIVE DIAGNOSIS: Cervical stenosis    OPERATIVE PROCEDURE: C4 to C5 anterior cervical diskectomy and fusion with instrumentation and application of interbody spacer at C4-C5    SURGEON: Alona Conner MD     ASSISTANT: LONG Mustafa    Specimens - none      ANESTHESIA: General    COMPLICATIONS: None    ESTIMATED BLOOD LOSS: 40 cc    INSTRUMENTATION: K2M plate, seaspine spacer    INDICATION FOR PROCEDURE: The patient is a very pleasant 56 y.o. female with cervical stenosis. The patient elected to proceed with operative intervention. She was aware of the risks, benefits, and alternatives. She was provided informed consent. PROCEDURE: The patient was identified in the preoperative holding area. The anterior cervical spine was marked by me. She was transferred to the operating room where general anesthesia was given. She was also given perioperative antibiotics. The patient was placed supine on the operating room table. All bony prominences were well-padded. The shoulders were taped. The anterior cervical spine was prepped and draped in the usual standard fashion. We performed a surgical time-out. I made a skin incision on the left side. It was transverse. I exposed the anterior cervical spine. I placed a needle into the disk space to verify our level. I exposed the disc space with electrocautery from uncus to uncus. I brought in the operating room microscope. I performed a diskectomy at C4-C5. I decompressed the spinal cord and nerve roots bilaterally. I prepared the endplates to bleeding bone. We had good hemostasis. I performed trial sizing.  I placed a spacer into C4-C5 with the appropriate amount of tension and alignment. I then placed an anterior cervical plate into C4 and C5. The screws were locked to the plate according to the manufacture's specification. We had good hemostasis. I copiously irrigated the entire wound. I placed a deep drain. The wound was closed with 3-0 Vicryl and 4-0 Monocryl. A sterile dressing was applied. The patient was extubated and transferred to the recovery room in good medical condition. I, Dr. Brenton Delgado, performed the above procedures.      Brenton Delgado MD  10/9/2018

## 2018-10-09 NOTE — ANESTHESIA PREPROCEDURE EVALUATION
Anesthetic History No history of anesthetic complications Review of Systems / Medical History Patient summary reviewed, nursing notes reviewed and pertinent labs reviewed Pulmonary Within defined limits Neuro/Psych Within defined limits Cardiovascular Dysrhythmias : SVT Pertinent negatives: No hypertension Exercise tolerance: >4 METS Comments: SVT ablation 2015 No CP  
GI/Hepatic/Renal 
  
GERD Endo/Other Within defined limits Other Findings Comments: History TMJ Physical Exam 
 
Airway Mallampati: II 
TM Distance: 4 - 6 cm Neck ROM: normal range of motion Mouth opening: Normal 
 
 Cardiovascular Regular rate and rhythm,  S1 and S2 normal,  no murmur, click, rub, or gallop Rhythm: regular Rate: normal 
 
 
 
 Dental 
No notable dental hx Pulmonary Breath sounds clear to auscultation Abdominal 
 
 
 
 Other Findings Anesthetic Plan ASA: 2 Anesthesia type: general 
 
 
 
 
Induction: Intravenous Anesthetic plan and risks discussed with: Patient

## 2018-10-09 NOTE — H&P
Date of Surgery Update:  Amor Scott was seen and examined. History and physical has been reviewed. The patient has been examined.  There have been no significant clinical changes since the completion of the originally dated History and Physical.    Signed By: Alberto Zelaya MD     October 9, 2018 7:31 AM

## 2018-10-09 NOTE — PERIOP NOTES
TRANSFER - OUT REPORT:    Verbal report given to NATAN Ochoa RN(name) on Manuel Batres  being transferred to [facility](unit) for routine post - op       Report consisted of patients Situation, Background, Assessment and   Recommendations(SBAR). Information from the following report(s) SBAR, OR Summary, Procedure Summary, Intake/Output and MAR was reviewed with the receiving nurse. Opportunity for questions and clarification was provided. Patient transported with:   Registered Nurse     RN and family at bedside.

## 2018-10-09 NOTE — IP AVS SNAPSHOT
[address] 
[phone] Patient: Dana Partida MRN: [id number] LPC:27/11/1117 A check marilee indicates which time of day the medication should be taken. My Medications START taking these medications Instructions Each Dose to Equal  
 Morning Noon Evening Bedtime  
 docusate sodium 100 mg capsule Commonly known as:  Orien  Your next dose is:  10/10/2018 6:00PM  
   
 Take 1 Cap by mouth two (2) times a day for 30 days. 100 mg  
    
   
   
   
  
 naloxone 4 mg/actuation nasal spray Commonly known as:  [employer] Use 1 spray intranasally, then discard. Repeat with new spray every 2 min as needed for opioid overdose symptoms, alternating nostrils. oxyCODONE IR 5 mg immediate release tablet Commonly known as:  Loreli Settle Your last dose was:  5mg on 10/10/2018 12:00PM  
Your next dose is:  10/10/2018 Can take as early as 4:00PM   
   
 Take 1-2 Tabs by mouth every four (4) hours as needed. Max Daily Amount: 60 mg.  
 5-10 mg CONTINUE taking these medications Instructions Each Dose to Equal  
 Morning Noon Evening Bedtime AIMOVIG AUTOINJECTOR SC Your next dose is:  Resume home schedule  
   
 by SubCUTAneous route. Monthly injection  
     
   
   
   
  
 cephALEXin 500 mg capsule Commonly known as:  Shayy Alanson Your next dose is:  Resume home schedule Take 500 mg by mouth two (2) times a day. 500 mg  
    
   
   
   
  
 clonazePAM 0.5 mg tablet Commonly known as:  Jacob Octave Your last dose was:  0.5 mg on 10/9/2018 10:55 PM  
   
 Take 0.5 mg by mouth three (3) times daily as needed. 0.5 mg  
    
   
   
   
  
 DEPO-PROVERA 150 mg/mL Syrg Generic drug:  medroxyPROGESTERone Your next dose is:  Resume home schedule 150 mg by IntraMUSCular route every three (3) months.   
 150 mg  
    
   
   
   
  
 EPINEPHrine 0.3 mg/0.3 mL injection Commonly known as:  EPIPEN  
   
      
   
   
   
  
 onabotulinumtoxinA 200 unit injection Commonly known as:  BOTOX Your next dose is:  Resume home schedule Inject in face/neck intramuscularly in 31 FDA approves sites, every 3 months, Dx:G43.709  
     
   
   
   
  
 venlafaxine- mg capsule Commonly known as:  EFFEXOR-XR Your last dose was:  150 mg on 10/10/2018  8:13 AM  
Your next dose is:  10/11/2018 TAKE 1 CAPSULE BY MOUTH EVERY DAY  
     
   
   
   
  
 ZOLOFT 25 mg tablet Generic drug:  sertraline Your last dose was:  25 mg on 10/10/2018  6:37 AM  
Your next dose is:  10/11/2018 Take 25 mg by mouth every morning. 25 mg  
    
   
   
   
  
  
STOP taking these medications ALEVE 220 mg tablet Generic drug:  naproxen sodium EXCEDRIN MIGRAINE PO  
   
  
 ibuprofen 200 mg tablet Commonly known as:  MOTRIN  
   
  
 OTHER Where to Get Your Medications Information on where to get these meds will be given to you by the nurse or doctor. ! Ask your nurse or doctor about these medications  
  docusate sodium 100 mg capsule  
 naloxone 4 mg/actuation nasal spray  
 oxyCODONE IR 5 mg immediate release tablet

## 2018-10-10 VITALS
SYSTOLIC BLOOD PRESSURE: 122 MMHG | WEIGHT: 121 LBS | HEIGHT: 62 IN | OXYGEN SATURATION: 96 % | BODY MASS INDEX: 22.26 KG/M2 | RESPIRATION RATE: 16 BRPM | HEART RATE: 95 BPM | TEMPERATURE: 99.2 F | DIASTOLIC BLOOD PRESSURE: 67 MMHG

## 2018-10-10 LAB
ANION GAP SERPL CALC-SCNC: 8 MMOL/L (ref 5–15)
BUN SERPL-MCNC: 8 MG/DL (ref 6–20)
BUN/CREAT SERPL: 11 (ref 12–20)
CALCIUM SERPL-MCNC: 8.3 MG/DL (ref 8.5–10.1)
CHLORIDE SERPL-SCNC: 105 MMOL/L (ref 97–108)
CO2 SERPL-SCNC: 27 MMOL/L (ref 21–32)
CREAT SERPL-MCNC: 0.76 MG/DL (ref 0.55–1.02)
GLUCOSE SERPL-MCNC: 111 MG/DL (ref 65–100)
HGB BLD-MCNC: 11.5 G/DL (ref 11.5–16)
POTASSIUM SERPL-SCNC: 3.8 MMOL/L (ref 3.5–5.1)
SODIUM SERPL-SCNC: 140 MMOL/L (ref 136–145)

## 2018-10-10 PROCEDURE — 74011000250 HC RX REV CODE- 250: Performed by: ORTHOPAEDIC SURGERY

## 2018-10-10 PROCEDURE — 36415 COLL VENOUS BLD VENIPUNCTURE: CPT | Performed by: ORTHOPAEDIC SURGERY

## 2018-10-10 PROCEDURE — 74011250636 HC RX REV CODE- 250/636: Performed by: ORTHOPAEDIC SURGERY

## 2018-10-10 PROCEDURE — 77010033678 HC OXYGEN DAILY

## 2018-10-10 PROCEDURE — 74011250637 HC RX REV CODE- 250/637: Performed by: ORTHOPAEDIC SURGERY

## 2018-10-10 PROCEDURE — 80048 BASIC METABOLIC PNL TOTAL CA: CPT | Performed by: ORTHOPAEDIC SURGERY

## 2018-10-10 PROCEDURE — 85018 HEMOGLOBIN: CPT | Performed by: ORTHOPAEDIC SURGERY

## 2018-10-10 PROCEDURE — 99218 HC RM OBSERVATION: CPT

## 2018-10-10 RX ORDER — OXYCODONE HYDROCHLORIDE 5 MG/1
5-10 TABLET ORAL
Qty: 60 TAB | Refills: 0 | Status: SHIPPED | OUTPATIENT
Start: 2018-10-10 | End: 2019-01-28 | Stop reason: ALTCHOICE

## 2018-10-10 RX ORDER — NALOXONE HYDROCHLORIDE 4 MG/.1ML
SPRAY NASAL
Qty: 1 EACH | Refills: 0 | Status: SHIPPED | OUTPATIENT
Start: 2018-10-10 | End: 2021-07-21

## 2018-10-10 RX ORDER — DOCUSATE SODIUM 100 MG/1
100 CAPSULE, LIQUID FILLED ORAL 2 TIMES DAILY
Qty: 60 CAP | Refills: 0 | Status: SHIPPED | OUTPATIENT
Start: 2018-10-10 | End: 2018-11-09

## 2018-10-10 RX ADMIN — ACETAMINOPHEN 650 MG: 325 TABLET ORAL at 08:22

## 2018-10-10 RX ADMIN — Medication 2 G: at 06:20

## 2018-10-10 RX ADMIN — BENZOCAINE, MENTHOL 1 LOZENGE: 15; 3.6 LOZENGE ORAL at 08:22

## 2018-10-10 RX ADMIN — POLYETHYLENE GLYCOL 3350 17 G: 17 POWDER, FOR SOLUTION ORAL at 08:13

## 2018-10-10 RX ADMIN — SENNOSIDES AND DOCUSATE SODIUM 1 TABLET: 8.6; 5 TABLET ORAL at 08:13

## 2018-10-10 RX ADMIN — SERTRALINE HYDROCHLORIDE 25 MG: 25 TABLET ORAL at 06:37

## 2018-10-10 RX ADMIN — FAMOTIDINE 20 MG: 20 TABLET ORAL at 08:13

## 2018-10-10 RX ADMIN — OXYCODONE HYDROCHLORIDE 10 MG: 5 TABLET ORAL at 06:37

## 2018-10-10 RX ADMIN — OXYCODONE HYDROCHLORIDE 5 MG: 5 TABLET ORAL at 11:57

## 2018-10-10 RX ADMIN — VENLAFAXINE HYDROCHLORIDE 150 MG: 150 CAPSULE, EXTENDED RELEASE ORAL at 08:13

## 2018-10-10 RX ADMIN — Medication 10 ML: at 06:40

## 2018-10-10 NOTE — DISCHARGE INSTRUCTIONS
Gema Cat MD  [facility]  Office Phone: [phone]  Neck Surgery Discharge Instructions  Activities   You are going home a well person, be as active as possible. Your only exercise should be walking. Start with short frequent walks and increase your walking distance each day. Start with walking twice a day for 5 minutes and increase your distance each day 2-3 minutes until you reach 25 minutes twice a day. Limit the amount of time you sit to 20-30 minute intervals. Sitting for prolonged periods of time will be uncomfortable for you following your surgery.  Do not lift anything over five pounds, and do not do any bending or straining.  Avoid reaching overhead in this post-operative period   Do not do any neck exercises until you have been instructed by your doctor.  When you are in the bed, you may lay on your back or on either side. Do not lie on your stomach.  Continue using your incentive spirometer regularly for deep breathing exercises   You may resume sexual relations 3-4 weeks after your surgery, depending on how you are feeling. Diet   You may resume your normal diet. If your throat is sore, you may want to eat soft foods for a few days. Be sure to drink plenty of fluids, it is important to keep yourself hydrated. If you begin having trouble swallowing, call the office immediately.  Avoid alcoholic beverages and ABSOLUTELY NO tobacco products. Tobacco products will interfere with your healing. If you continue to use tobacco, you may end up needing another surgery in the future. Medications   Do not take anti-inflammatory medications or aspirin unless instructed by your physician.  Take your pain medication as directed.  Do NOT take additional Tylenol if your prescribed pain medication has acetaminophen in it (Endocet/Percocet, Lortab, Norco).  It is important to have regular bowel movements. Pain medications may cause constipation.   Stool softeners, prune juice, and increasing your water and fiber intake may help in preventing constipation.  Do NOT take laxatives if at all possible except in severe situations. It can results in a vicious cycle of constipation and diarrhea.  Do not be alarmed if you still have some of the same symptoms you had prior to surgery. The nerves often require time to heal after the pressure has been relieved. You may experience pain in your shoulders or between your shoulder blades, which is common after this surgery. The level of pain you experience should improve as your body heals. Driving   You may not drive or return to work until instructed by your physician. However, you may ride in the car for short periods of time. Neck collar   Wear your neck brace. You may remove it for short breaks, when eating or showering. You must keep the brace on while sleeping and when ambulating. Showering   You may shower in approximately 5 days after your surgery if your incision is not draining.  You may remove your brace during showers.  Do not rub or apply lotion or ointments to the incision site.  Do not use tub baths, swimming pools or Jacuzzis. Caring for your incision   Keep the clear, plastic dressing on until 3 days after surgery. At that point, if the incision is dry and without drainage, you may keep the wound open to air without cover.  You may have steri-strips on your incision (small, white pieces of tape). Do not pull the steri-strips; they will fall off on their own after several days. If you have sutures or staples, they will be removed by home health or when you see your physician.  Do not rub or apply any lotions or ointments to your incision site. Follow Up   Once you are home, call your physicians office to schedule an appointment 2-3 weeks after surgery. Notify your physician if you develop any of the following conditions:   Fever above 101 degrees for 24 hours.    Nausea or vomiting.  Severe headache.  Inability to urinate.  Loss of bowel or bladder control (sudden onset of incontinence).  Changes in sensation in your extremities (numbness, tingling, loss of color).  Severe pain or pain not relieved by medications.  Redness, swelling, or drainage from your incision.  Persistent pain in the chest.    Pain in the calf of either leg.  Increased weakness (if this is greater than before your surgery). If you have any questions, contact your Orthopaedic Surgeons office. OFFICE OF DR. Drew Logan   [phone]  [place] [address], [address], [address] [facility], [address] [facility] [address]     * WEAR YOUR BRACE AS ADVISED    * NO DRIVING UNTIL YOU ARE CLEARED TO DO SO BY YOUR SURGEON    * LIMIT LIFTING, BENDING AND TWISTING.  NO LIFTING MORE THAN 5 LBS    * MAKE SURE YOU ARE GETTING GOOD NUTRITION (Lean Protein, Vitamin D AND Calcium)    * DO NOT TAKE ANY NSAIDs FOR THE FIRST 3 MONTHS AFTER SURGERY (such as Advil/Ibuprofen/Motrin, Aleve/Naproxen/Naprosyn, Diclofenac, Celebrex, Meloxicam, Indomethacin, Goody's powder, BC powder etc.)    * NO NICOTINE PRODUCTS    * FULLY READ YOUR DISCHARGE INSTRUCTIONS

## 2018-10-10 NOTE — PROGRESS NOTES
Discharge instructions and prescriptions reviewed with patient and her . Opportunity provided for questions. Patient and  verbalized understanding. IV removed. Signed copy of discharge placed in the front of the patient's chart.

## 2018-10-10 NOTE — PROGRESS NOTES
Problem: Falls - Risk of  Goal: *Absence of Falls  Document Rosa Fall Risk and appropriate interventions in the flowsheet.    Outcome: Progressing Towards Goal  Fall Risk Interventions:            Medication Interventions: Bed/chair exit alarm, Patient to call before getting OOB, Utilize gait belt for transfers/ambulation    Elimination Interventions: Call light in reach, Bed/chair exit alarm, Toileting schedule/hourly rounds

## 2018-10-10 NOTE — PROGRESS NOTES
Bedside, Verbal and Written shift change report given to Sheri Elizondo  (oncoming nurse) by Chance Mckenna RN  (offgoing nurse). Report included the following information SBAR, Kardex, ED Summary, OR Summary, Procedure Summary, Intake/Output, MAR, Accordion, Recent Results, Med Rec Status, Procedure Verification and Quality Measures.

## 2018-10-10 NOTE — PROGRESS NOTES
ORTHOPAEDIC CERVICAL FUSION PROGRESS NOTE    NAME:     Reid Hopkins   :       1970   MRN:       [id number]   DATE:      10/10/2018    POD:              1 Day Post-Op  S/P:              Procedure(s):  C4-C5 ANTERIOR CERVICAL DISCECTOMY AND FUSION WITH INSTRUMENTATION    SUBJECTIVE:  C/O sore throat   No arm pain or numbness  Denies nausea/vomiting, headache, chest pain or shortness of breath  Pain controlled    Recent Labs      10/10/18   0649   HGB  11.5   NA  140   K  3.8   CL  105   CO2  27   BUN  8   CREA  0.76   GLU  111*     Patient Vitals for the past 12 hrs:   BP Temp Pulse Resp SpO2   10/10/18 0755 121/83 97.7 °F (36.5 °C) 94 14 98 %   10/10/18 0336 121/80 97.9 °F (36.6 °C) 85 17 99 %   10/10/18 0008 133/87 98 °F (36.7 °C) 88 18 98 %       Exam:  Soft collar inplace / intact  Dressings clean and dry  Positive strength/ROM bilat upper ext.   Neuro intact to sensation  BL UEs NVID    PLAN:  Continue PO pain medications as needed  Chloraseptic spray at bedside  Advance diet as tolerated  Out of bed w/ assist  Likely D/C to home today      Aide [place]  Orthopaedic Surgery  Physician Assistant to Dr. Gavino Monson

## 2018-10-10 NOTE — PROGRESS NOTES
10/10/2018  11:04 AM  Reason for Admission:  Elective - Cervical spinal stenosis                   RRAT Score:          3           Plan for utilizing home health:      No HH needs indicated. Likelihood of Readmission:   Green                         Transition of Care Plan:                   CM met with pt for assessment. Demographics and PCP were confirmed. Pt is a 56year old,  female who lives in a private residence with her  (4 exterior steps, 0 interior steps). PTA, pt was able to complete ADLs without the use of DME. Pt has prescription drug coverage, and prefers [facility] in [place]. OBS letter provided and explained. No discharge service needs indicated. Grady Chun MA    Care Management Interventions  PCP Verified by CM: Yes Shelly Stands - No NN to notify)  Palliative Care Criteria Met (RRAT>21 & CHF Dx)?: No  Mode of Transport at Discharge:  Other (see comment) ()  MyChart Signup: No  Discharge Durable Medical Equipment: No  Physical Therapy Consult: No  Occupational Therapy Consult: No  Speech Therapy Consult: No  Current Support Network: Lives with Spouse  Confirm Follow Up Transport: Family  Plan discussed with Pt/Family/Caregiver: Yes  Discharge Location  Discharge Placement: Home with family assistance

## 2018-10-11 ENCOUNTER — TELEPHONE (OUTPATIENT)
Dept: NEUROLOGY | Age: 48
End: 2018-10-11

## 2018-10-11 NOTE — TELEPHONE ENCOUNTER
----- Message from Jose Antonio sent at 10/11/2018 10:32 AM EDT -----  Regarding: FUAD Tapia/Telephone  Pt requested a call back today from the nurse regarding whether she can take her medication(\"Amovig\") today to stay on schedule, because she had spine and neck surgery on yesterday and she can not take any OTC medication. Best contact number [phone] or [phone].
Yes she can per the NP. Contacted the patient to let her know of the previous message sent from the NP. She has verbalized understanding.
No

## 2018-10-13 NOTE — DISCHARGE SUMMARY
DISCHARGE SUMMARY     Patient: Diaz Pillai                             Medical Record Number: [id number]                : 1970  Age: 56 y.o. Admit Date: 10/9/2018  Discharge Date: 10/10/2018  Admission Diagnosis: Cervical spinal stenosis [M48.02]  Discharge Diagnosis: Cervical spinal stenosis [M48.02]  Procedures: Procedure(s):  C4-C5 ANTERIOR CERVICAL DISCECTOMY AND FUSION WITH INSTRUMENTATION  Surgeon: Richard Shaw MD  Anesthesia: General  Complications: None     History of Present Illness:  Diaz Pillai is a 56 y.o. female with a history of intractable neck pain and radiculopathy. Despite conservative management and after clinical and radiographic evaluation, it was determined that she suffered from cervical stenosis and would benefit from Procedure(s):  C4-C5 ANTERIOR CERVICAL DISCECTOMY AND FUSION WITH INSTRUMENTATION, which she consented to undergo after a discussion of the risks, benefits, alternatives, rehab concerns, and potential complications of surgery. Hospital Course:  Matias Hopkins tolerated the procedure well. She was transferred  to the recovery room in stable condition. After a brief stay, the patient was then transferred to the Orthopedic floor. On postoperative day #1, the dressing was clean and dry and she was neurovascularly intact. The patient was afebrile and vital signs were stable. Diaz Pillai was deemed able to be discharged to Home  in stable condition on postoperative day 1. She was provided with routine postoperative instructions and advised to follow up in my office in 3 weeks following discharge from the hospital.  She was given a brace and prescriptions for medication to control post-operative symptoms. Discharge Medications:  Discharge Medication List as of 10/10/2018 11:48 AM      START taking these medications    Details   oxyCODONE IR (ROXICODONE) 5 mg immediate release tablet Take 1-2 Tabs by mouth every four (4) hours as needed. Max Daily Amount: 60 mg., Print, Disp-60 Tab, R-0      docusate sodium (COLACE) 100 mg capsule Take 1 Cap by mouth two (2) times a day for 30 days. , Print, Disp-60 Cap, R-0      naloxone (NARCAN) 4 mg/actuation nasal spray Use 1 spray intranasally, then discard. Repeat with new spray every 2 min as needed for opioid overdose symptoms, alternating nostrils. , Print, Disp-1 Each, R-0         CONTINUE these medications which have NOT CHANGED    Details   venlafaxine-SR (EFFEXOR-XR) 150 mg capsule TAKE 1 CAPSULE BY MOUTH EVERY DAY, Normal**Patient requests 90 days supply**Disp-90 Cap, R-0      erenumab-aooe (AIMOVIG AUTOINJECTOR SC) by SubCUTAneous route.  Monthly injection, Historical Med      cephALEXin (KEFLEX) 500 mg capsule Take 500 mg by mouth two (2) times a day., Historical Med      sertraline (ZOLOFT) 25 mg tablet Take 25 mg by mouth every morning., Historical Med      clonazePAM (KLONOPIN) 0.5 mg tablet Take 0.5 mg by mouth three (3) times daily as needed., Historical Med      EPINEPHrine (EPIPEN) 0.3 mg/0.3 mL injection Historical Med, R-5      onabotulinumtoxinA (BOTOX) 200 unit injection Inject in face/neck intramuscularly in 31 FDA approves sites, every 3 months, Dx:G43.709, Print, Disp-200 Units, R-3      medroxyPROGESTERone (DEPO-PROVERA) 150 mg/mL syrg 150 mg by IntraMUSCular route every three (3) months., Historical Med         STOP taking these medications       aspirin/acetaminophen/caffeine (EXCEDRIN MIGRAINE PO) Comments:   Reason for Stopping:         naproxen sodium (ALEVE) 220 mg tablet Comments:   Reason for Stopping:         ibuprofen (MOTRIN) 200 mg tablet Comments:   Reason for Stopping:         OTHER Comments:   Reason for Stopping:               LONG Kraus  10/10/2018  Orthopaedic Surgery  Physician Assistant to Dr. Carol Uribe

## 2018-12-27 DIAGNOSIS — G43.709 CHRONIC MIGRAINE WITHOUT AURA WITHOUT STATUS MIGRAINOSUS, NOT INTRACTABLE: ICD-10-CM

## 2018-12-27 DIAGNOSIS — F41.9 ANXIETY: ICD-10-CM

## 2018-12-27 RX ORDER — VENLAFAXINE HYDROCHLORIDE 150 MG/1
CAPSULE, EXTENDED RELEASE ORAL
Qty: 90 CAP | Refills: 0 | Status: SHIPPED | OUTPATIENT
Start: 2018-12-27 | End: 2019-03-30 | Stop reason: SDUPTHER

## 2018-12-27 NOTE — TELEPHONE ENCOUNTER
----- Message from Marilin Frost sent at 12/27/2018  2:53 PM EST -----  Regarding: FUAD Tapia/Telephone  Pt is calling for new script on Amivig and completely out of her  \"denaflaxin\" sent to [facility] in [facility]. [phone]

## 2019-01-21 ENCOUNTER — HOSPITAL ENCOUNTER (OUTPATIENT)
Dept: MRI IMAGING | Age: 49
Discharge: HOME | End: 2019-01-21
Attending: ORTHOPAEDIC SURGERY
Payer: COMMERCIAL

## 2019-01-21 DIAGNOSIS — M54.12 CERVICAL RADICULOPATHY: ICD-10-CM

## 2019-01-21 DIAGNOSIS — Z98.1 S/P CERVICAL SPINAL FUSION: ICD-10-CM

## 2019-01-21 PROCEDURE — 72141 MRI NECK SPINE W/O DYE: CPT

## 2019-01-28 ENCOUNTER — OFFICE VISIT (OUTPATIENT)
Dept: NEUROLOGY | Age: 49
End: 2019-01-28

## 2019-01-28 VITALS
HEART RATE: 93 BPM | DIASTOLIC BLOOD PRESSURE: 84 MMHG | SYSTOLIC BLOOD PRESSURE: 124 MMHG | HEIGHT: 62 IN | RESPIRATION RATE: 20 BRPM | BODY MASS INDEX: 22.13 KG/M2 | OXYGEN SATURATION: 98 %

## 2019-01-28 DIAGNOSIS — G43.709 CHRONIC MIGRAINE WITHOUT AURA WITHOUT STATUS MIGRAINOSUS, NOT INTRACTABLE: ICD-10-CM

## 2019-01-28 DIAGNOSIS — G43.719 INTRACTABLE CHRONIC MIGRAINE WITHOUT AURA AND WITHOUT STATUS MIGRAINOSUS: Primary | ICD-10-CM

## 2019-01-28 DIAGNOSIS — F41.9 ANXIETY: ICD-10-CM

## 2019-01-28 RX ORDER — HYDROCODONE BITARTRATE AND ACETAMINOPHEN 5; 325 MG/1; MG/1
1-2 TABLET ORAL
COMMUNITY
Start: 2018-11-30 | End: 2020-02-27 | Stop reason: ALTCHOICE

## 2019-01-28 RX ORDER — IBUPROFEN 600 MG/1
800 TABLET ORAL
COMMUNITY

## 2019-01-28 RX ORDER — HYDROGEN PEROXIDE 3 %
20 SOLUTION, NON-ORAL MISCELLANEOUS
COMMUNITY

## 2019-01-28 RX ORDER — BUTALBITAL, ACETAMINOPHEN AND CAFFEINE 300; 40; 50 MG/1; MG/1; MG/1
CAPSULE ORAL
Refills: 0 | COMMUNITY
Start: 2018-12-01 | End: 2019-01-28 | Stop reason: SDUPTHER

## 2019-01-28 RX ORDER — CYCLOBENZAPRINE HCL 10 MG
TABLET ORAL
Refills: 1 | COMMUNITY
Start: 2018-11-14 | End: 2020-02-27

## 2019-01-28 RX ORDER — ONDANSETRON 4 MG/1
TABLET, ORALLY DISINTEGRATING ORAL
Refills: 0 | COMMUNITY
Start: 2018-12-01 | End: 2020-02-27

## 2019-01-28 RX ORDER — BUTALBITAL, ACETAMINOPHEN AND CAFFEINE 300; 40; 50 MG/1; MG/1; MG/1
CAPSULE ORAL
Qty: 30 CAP | Refills: 0 | Status: SHIPPED | OUTPATIENT
Start: 2019-01-28 | End: 2020-02-27

## 2019-01-28 NOTE — PROGRESS NOTES
She has done well on the Aimovig   But she is not sure if that is because of her spine surgery or just the benefit from the Yasmeen Hoit     She has to go talk ot the pharmacy about her next shipment of her injections which should have been ready on the 10th?   They keep telling her they are having an insurance issue- she is getting it through the bridge program    Migraines- off of the relaxers for her arm, she is having them maybe 3 times a week  Had 1 that lasted about 5 days even after going to [facility]     She did say that mauricio was helping a lot

## 2019-01-28 NOTE — PROGRESS NOTES
Date:  19     Name:  Mark Son  :  1970  MRN:  [id number]     PCP:  Eduardo Aleman MD    Chief Complaint   Patient presents with    Headache     HISTORY OF PRESENT ILLNESS: Follow-up for migraines. Since her last office, she had the MRI of her cervical spine which demonstrated central canal and foraminal stenosis. She was referred to neurosurgery and has since had ACDF of C4-C5 in . At her last office visit, she was started on Aimovig. She was due for another injection but is having some issue getting it delivered. Her last injection was December 10. Currently, she is getting them about 3 a week. She does use Excedrin migraine and the Fioricet. Except as noted above, denies  fever, chills, cough. No CP or SOB. No dysuria, loss of bowel or bladder control. No Weight loss. Appetite good. Sleeping well. No sweats. No edema. No bruising or bleeding. No nausea or vomit. No diarrhea. No frequency, urgency, No depressive sxs. No anxiety. Denies sore throat, nasal congestion, nasal discharge, epistaxis, tinnitus, hearing loss, back pain, muscle pain, or joint pain. Current Outpatient Medications   Medication Sig    HYDROcodone-acetaminophen (NORCO) 5-325 mg per tablet Take 1-2 Tabs by mouth.  butalbital-acetaminophen-caff (FIORICET) -40 mg per capsule TK 1 C PO Q 4 H PRN    cyclobenzaprine (FLEXERIL) 10 mg tablet     esomeprazole (NEXIUM) 20 mg capsule Take 20 mg by mouth.  ibuprofen (MOTRIN) 600 mg tablet ibuprofen 600 mg tablet    ondansetron (ZOFRAN ODT) 4 mg disintegrating tablet DIS 1 T ON THE TONGUE Q 6 TO 8 H PRN    erenumab-aooe (AIMOVIG AUTOINJECTOR) 70 mg/mL atIn 70 mg by SubCUTAneous route every month. Monthly injection    venlafaxine-SR (EFFEXOR-XR) 150 mg capsule TAKE 1 CAPSULE BY MOUTH EVERY DAY    naloxone (NARCAN) 4 mg/actuation nasal spray Use 1 spray intranasally, then discard.  Repeat with new spray every 2 min as needed for opioid overdose symptoms, alternating nostrils.  sertraline (ZOLOFT) 25 mg tablet Take 25 mg by mouth every morning.  EPINEPHrine (EPIPEN) 0.3 mg/0.3 mL injection     medroxyPROGESTERone (DEPO-PROVERA) 150 mg/mL syrg 150 mg by IntraMUSCular route every three (3) months. No current facility-administered medications for this visit.       Allergies   Allergen Reactions    Benadryl [Diphenhydramine Hcl] Other (comments)     Able to tolerate IV benadryl but oral benadryl causes her to feel like \"things are coming out of my hands\"    Iodine Swelling     Face/hands    Shellfish Containing Products Swelling     Face    Sting, Bee Swelling     Past Medical History:   Diagnosis Date    Anxiety     With \"nervous tick\"    Calculus of kidney 2016    x 1     Claustrophobia     Fatigue     From an injury    GERD (gastroesophageal reflux disease)     Severe    Migraine     Nightmare     PTSD (post-traumatic stress disorder)     SVT (supraventricular tachycardia) ([facility] [address] 2015    None since ablation    Vertigo     Vitiligo      Past Surgical History:   Procedure Laterality Date    HX OTHER SURGICAL Right 01/31/2017    wrist surgery, cleaned out all of the scar tissue from an old accident     HX SVT ABLATION N/A 2015    MI EXCIS BARTHOLIN GLAND/CYST  2007    MI PELVIS/HIP JOINT SURGERY UNLISTED Left 1996    Piriformis Syndrome     Social History     Socioeconomic History    Marital status:      Spouse name: Not on file    Number of children: Not on file    Years of education: Not on file    Highest education level: Not on file   Social Needs    Financial resource strain: Not on file    Food insecurity - worry: Not on file    Food insecurity - inability: Not on file   [employer] needs - medical: Not on file   [employer] needs - non-medical: Not on file   Occupational History    Not on file   Tobacco Use    Smoking status: Never Smoker    Smokeless tobacco: Never Used Substance and Sexual Activity    Alcohol use: No    Drug use: No    Sexual activity: Yes     Birth control/protection: Pill   Other Topics Concern    Not on file   Social History Narrative    Not on file     Family History   Problem Relation Age of Onset   [address] [facility] Parkinsonism Father     Diabetes Father     Diabetes Brother     Pacemaker Brother         & Defibrallator    Cancer Mother         Breast    Heart Disease Mother         R/t Radiation treatment    Diabetes Mother     Anesth Problems Mother         Difficult intubation due to small traches       PHYSICAL EXAMINATION:    Visit Vitals  /84   Pulse 93   Resp 20   Ht 5' 2\" (1.575 m)   SpO2 98%   BMI 22.13 kg/m²     General: Well defined, nourished, and groomed individual in no acute distress.    Neck: Supple, nontender, no bruits, no pain with resistance to active range of motion.    Heart: Regular rate and rhythm, no murmurs, rub, or gallop. Normal S1S2.    Lungs: Clear to auscultation bilaterally with equal chest expansion, no cough, no wheeze    Musculoskeletal: Extremities revealed no edema. Psych: Good mood and bright affect    NEUROLOGICAL EXAMINATION:    Mental Status: Alert and oriented to person, place, and time    Cranial Nerves:    II, III, IV, VI: Visual acuity grossly intact. Visual fields are normal.    Pupils are equal, round, and reactive to light and accommodation.    Extra-ocular movements are full and fluid. Fundoscopic exam was benign, no ptosis or nystagmus.    V-XII: Hearing is grossly intact. Facial features are symmetric, with normal sensation and strength. The palate rises symmetrically and the tongue protrudes midline. Sternocleidomastoids 5/5.    Motor Examination: Normal tone, bulk, and strength, 5/5 muscle strength throughout.    Coordination: No resting or intention tremor    Gait and Station: Steady while walking. Normal arm swing. No muscle wasting or fasiculations noted.    Reflexes: DTRs 2+ throughout. ASSESSMENT AND PLAN    ICD-10-CM ICD-9-CM    1. Intractable chronic migraine without aura and without status migrainosus G43.719 346.71 erenumab-aooe (AIMOVIG AUTOINJECTOR) 70 mg/mL atIn   2. Chronic migraine without aura without status migrainosus, not intractable G43.709 346.70    3. Anxiety F41.9 300.00      Between her recent anterior cervical fusion and adding Aimovig for migraine prevention, she has seen a decrease in the number and intensity of the migraines. Unfortunately, she does continue to have them approximately 3 times per week. Recommend she increase the Aimovig to 140 mg monthly. She can continue taking Fioricet as needed for acute abortive therapy. She was instructed to restrict its use to no more than 2 or 3 times per week to avoid rebound headache. Follow-up in 3 months or sooner if needed. Gerda Yu Rout

## 2019-01-28 NOTE — PATIENT INSTRUCTIONS
A Healthy Lifestyle: Care Instructions  Your Care Instructions    A healthy lifestyle can help you feel good, stay at a healthy weight, and have plenty of energy for both work and play. A healthy lifestyle is something you can share with your whole family. A healthy lifestyle also can lower your risk for serious health problems, such as high blood pressure, heart disease, and diabetes. You can follow a few steps listed below to improve your health and the health of your family. Follow-up care is a key part of your treatment and safety. Be sure to make and go to all appointments, and call your doctor if you are having problems. It's also a good idea to know your test results and keep a list of the medicines you take. How can you care for yourself at home? · Do not eat too much sugar, fat, or fast foods. You can still have dessert and treats now and then. The goal is moderation. · Start small to improve your eating habits. Pay attention to portion sizes, drink less juice and soda pop, and eat more fruits and vegetables. ? Eat a healthy amount of food. A 3-ounce serving of meat, for example, is about the size of a deck of cards. Fill the rest of your plate with vegetables and whole grains. ? Limit the amount of soda and sports drinks you have every day. Drink more water when you are thirsty. ? Eat at least 5 servings of fruits and vegetables every day. It may seem like a lot, but it is not hard to reach this goal. A serving or helping is 1 piece of fruit, 1 cup of vegetables, or 2 cups of leafy, raw vegetables. Have an apple or some carrot sticks as an afternoon snack instead of a candy bar. Try to have fruits and/or vegetables at every meal.  · Make exercise part of your daily routine. You may want to start with simple activities, such as walking, bicycling, or slow swimming. Try to be active 30 to 60 minutes every day. You do not need to do all 30 to 60 minutes all at once.  For example, you can exercise 3 times a day for 10 or 20 minutes. Moderate exercise is safe for most people, but it is always a good idea to talk to your doctor before starting an exercise program.  · Keep moving. Caleen Newer the lawn, work in the garden, or [employer]. Take the stairs instead of the elevator at work. · If you smoke, quit. People who smoke have an increased risk for heart attack, stroke, cancer, and other lung illnesses. Quitting is hard, but there are ways to boost your chance of quitting tobacco for good. ? Use nicotine gum, patches, or lozenges. ? Ask your doctor about stop-smoking programs and medicines. ? Keep trying. In addition to reducing your risk of diseases in the future, you will notice some benefits soon after you stop using tobacco. If you have shortness of breath or asthma symptoms, they will likely get better within a few weeks after you quit. · Limit how much alcohol you drink. Moderate amounts of alcohol (up to 2 drinks a day for men, 1 drink a day for women) are okay. But drinking too much can lead to liver problems, high blood pressure, and other health problems. Family health  If you have a family, there are many things you can do together to improve your health. · Eat meals together as a family as often as possible. · Eat healthy foods. This includes fruits, vegetables, lean meats and dairy, and whole grains. · Include your family in your fitness plan. Most people think of activities such as jogging or tennis as the way to fitness, but there are many ways you and your family can be more active. Anything that makes you breathe hard and gets your heart pumping is exercise. Here are some tips:  ? Walk to do errands or to take your child to school or the bus.  ? Go for a family bike ride after dinner instead of watching TV. Where can you learn more? Go to http://neeta-princess.info/. Enter K235 in the search box to learn more about \"A Healthy Lifestyle: Care Instructions. \"  Current as of: September 11, 2018  Content Version: 11.9  © 9470-5045 [employer]. Care instructions adapted under license by [employer] (which disclaims liability or warranty for this information). If you have questions about a medical condition or this instruction, always ask your healthcare professional. [address] any warranty or liability for your use of this information.

## 2019-02-25 ENCOUNTER — TELEPHONE (OUTPATIENT)
Dept: NEUROLOGY | Age: 49
End: 2019-02-25

## 2019-02-25 NOTE — TELEPHONE ENCOUNTER
Received PA request for AIMOVIG from [employer]. Submitted through Cover my meds. PA Pending  Key# [id number]    Received favorable outcome. PA case# [id number]    Called pharmacy to advise.

## 2019-03-30 DIAGNOSIS — G43.709 CHRONIC MIGRAINE WITHOUT AURA WITHOUT STATUS MIGRAINOSUS, NOT INTRACTABLE: ICD-10-CM

## 2019-03-30 DIAGNOSIS — F41.9 ANXIETY: ICD-10-CM

## 2019-04-01 RX ORDER — VENLAFAXINE HYDROCHLORIDE 150 MG/1
CAPSULE, EXTENDED RELEASE ORAL
Qty: 90 CAP | Refills: 0 | Status: SHIPPED | OUTPATIENT
Start: 2019-04-01 | End: 2019-07-01 | Stop reason: SDUPTHER

## 2019-04-03 ENCOUNTER — ED HISTORICAL/CONVERTED ENCOUNTER (OUTPATIENT)
Dept: OTHER | Age: 49
End: 2019-04-03

## 2019-04-08 ENCOUNTER — TELEPHONE (OUTPATIENT)
Dept: NEUROLOGY | Age: 49
End: 2019-04-08

## 2019-04-30 ENCOUNTER — OFFICE VISIT (OUTPATIENT)
Dept: NEUROLOGY | Age: 49
End: 2019-04-30

## 2019-04-30 VITALS
SYSTOLIC BLOOD PRESSURE: 106 MMHG | HEART RATE: 99 BPM | RESPIRATION RATE: 20 BRPM | OXYGEN SATURATION: 96 % | BODY MASS INDEX: 22.13 KG/M2 | DIASTOLIC BLOOD PRESSURE: 74 MMHG | HEIGHT: 62 IN

## 2019-04-30 DIAGNOSIS — F41.9 ANXIETY: ICD-10-CM

## 2019-04-30 DIAGNOSIS — M79.18 MYOFASCIAL PAIN ON RIGHT SIDE: ICD-10-CM

## 2019-04-30 DIAGNOSIS — G43.709 CHRONIC MIGRAINE WITHOUT AURA WITHOUT STATUS MIGRAINOSUS, NOT INTRACTABLE: Primary | ICD-10-CM

## 2019-04-30 DIAGNOSIS — S49.91XD INJURY OF RIGHT SHOULDER, SUBSEQUENT ENCOUNTER: ICD-10-CM

## 2019-04-30 DIAGNOSIS — F43.12 NIGHTMARES ASSOCIATED WITH CHRONIC POST-TRAUMATIC STRESS DISORDER: ICD-10-CM

## 2019-04-30 DIAGNOSIS — F51.5 NIGHTMARES ASSOCIATED WITH CHRONIC POST-TRAUMATIC STRESS DISORDER: ICD-10-CM

## 2019-04-30 DIAGNOSIS — G43.719 INTRACTABLE CHRONIC MIGRAINE WITHOUT AURA AND WITHOUT STATUS MIGRAINOSUS: ICD-10-CM

## 2019-04-30 RX ORDER — LORAZEPAM 0.5 MG/1
1 TABLET ORAL
Refills: 2 | COMMUNITY
Start: 2019-04-18

## 2019-04-30 RX ORDER — PRAZOSIN HYDROCHLORIDE 1 MG/1
1 CAPSULE ORAL
Qty: 30 CAP | Refills: 3 | Status: SHIPPED | OUTPATIENT
Start: 2019-04-30 | End: 2019-04-30 | Stop reason: SDUPTHER

## 2019-04-30 RX ORDER — ESCITALOPRAM OXALATE 10 MG/1
TABLET ORAL
Refills: 1 | COMMUNITY
Start: 2019-02-27

## 2019-04-30 NOTE — PROGRESS NOTES
Migraines- this month they have been bad, but she thinks it is from her shoulder surgery, she hasn't felt good since then     Not a migraine, but a different kind of pain after she had vomitted a couple of times, [facility] was called, they thought she had a ruptured blood vessels, scans were done and nothing came back.  They gave her a migraine cocktail which didn't help then they gave her a antipsychotic medication which did help alleviate some of the pain but it was still there for a couple of days     She does think the aimovig is helping it has just been this month that she has been having issues but she thinks it is coming from the surgery
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
## 2019-05-01 RX ORDER — PRAZOSIN HYDROCHLORIDE 1 MG/1
CAPSULE ORAL
Qty: 90 CAP | Refills: 3 | Status: SHIPPED | OUTPATIENT
Start: 2019-05-01

## 2019-07-01 DIAGNOSIS — F41.9 ANXIETY: ICD-10-CM

## 2019-07-01 DIAGNOSIS — G43.709 CHRONIC MIGRAINE WITHOUT AURA WITHOUT STATUS MIGRAINOSUS, NOT INTRACTABLE: ICD-10-CM

## 2019-07-01 RX ORDER — VENLAFAXINE HYDROCHLORIDE 150 MG/1
CAPSULE, EXTENDED RELEASE ORAL
Qty: 90 CAP | Refills: 0 | Status: SHIPPED | OUTPATIENT
Start: 2019-07-01 | End: 2019-08-29 | Stop reason: SDUPTHER

## 2019-08-29 DIAGNOSIS — G43.709 CHRONIC MIGRAINE WITHOUT AURA WITHOUT STATUS MIGRAINOSUS, NOT INTRACTABLE: ICD-10-CM

## 2019-08-29 DIAGNOSIS — F41.9 ANXIETY: ICD-10-CM

## 2019-08-29 RX ORDER — VENLAFAXINE HYDROCHLORIDE 150 MG/1
CAPSULE, EXTENDED RELEASE ORAL
Qty: 90 CAP | Refills: 0 | Status: SHIPPED | OUTPATIENT
Start: 2019-08-29 | End: 2019-12-17 | Stop reason: SDUPTHER

## 2019-10-04 ENCOUNTER — TELEPHONE (OUTPATIENT)
Dept: NEUROLOGY | Age: 49
End: 2019-10-04

## 2019-10-04 NOTE — TELEPHONE ENCOUNTER
----- Message from Adrianna Hodges sent at 10/4/2019 10:31 AM EDT -----  Regarding: MARLENI Pringle  General Message/Vendor Calls    Caller's first and last name: Jannet Litten from [place] [id number]      Reason for call: dosage amount      Callback required yes/no and why: yes, the right dosage      Best contact number(s):[phone]      Details to clarify the request: Jannet Litten, pharmacist with [facility] would like a call back on the mg of Rx \" Fatuma Cambric. ?\" there are two strength and [facility] would need to know the correct dosage, 70mg or 140 mg       Adrianna Hodges

## 2019-12-17 DIAGNOSIS — F41.9 ANXIETY: ICD-10-CM

## 2019-12-17 DIAGNOSIS — G43.709 CHRONIC MIGRAINE WITHOUT AURA WITHOUT STATUS MIGRAINOSUS, NOT INTRACTABLE: ICD-10-CM

## 2019-12-18 RX ORDER — VENLAFAXINE HYDROCHLORIDE 150 MG/1
CAPSULE, EXTENDED RELEASE ORAL
Qty: 90 CAP | Refills: 0 | Status: SHIPPED | OUTPATIENT
Start: 2019-12-18 | End: 2020-02-27 | Stop reason: SDUPTHER

## 2020-02-07 DIAGNOSIS — G43.709 CHRONIC MIGRAINE WITHOUT AURA WITHOUT STATUS MIGRAINOSUS, NOT INTRACTABLE: ICD-10-CM

## 2020-02-10 RX ORDER — ERENUMAB-AOOE 140 MG/ML
INJECTION, SOLUTION SUBCUTANEOUS
Qty: 1 ML | Refills: 2 | Status: SHIPPED | OUTPATIENT
Start: 2020-02-10 | End: 2020-02-27 | Stop reason: SDUPTHER

## 2020-02-27 ENCOUNTER — OFFICE VISIT (OUTPATIENT)
Dept: NEUROLOGY | Age: 50
End: 2020-02-27

## 2020-02-27 VITALS
SYSTOLIC BLOOD PRESSURE: 118 MMHG | RESPIRATION RATE: 18 BRPM | HEART RATE: 94 BPM | OXYGEN SATURATION: 96 % | BODY MASS INDEX: 21.35 KG/M2 | DIASTOLIC BLOOD PRESSURE: 80 MMHG | HEIGHT: 62 IN | WEIGHT: 116 LBS

## 2020-02-27 DIAGNOSIS — G43.709 CHRONIC MIGRAINE WITHOUT AURA WITHOUT STATUS MIGRAINOSUS, NOT INTRACTABLE: ICD-10-CM

## 2020-02-27 DIAGNOSIS — F41.9 ANXIETY: ICD-10-CM

## 2020-02-27 RX ORDER — VENLAFAXINE HYDROCHLORIDE 150 MG/1
CAPSULE, EXTENDED RELEASE ORAL
Qty: 90 CAP | Refills: 3 | Status: SHIPPED | OUTPATIENT
Start: 2020-02-27 | End: 2021-01-18

## 2020-02-27 NOTE — PROGRESS NOTES
Patient states her migraines has been getting a little better since her last visit. She states she is getting about 15 migraines a month. .  Chief Complaint   Patient presents with    Follow-up     migraines     .   Visit Vitals  /80 (BP 1 Location: Left arm, BP Patient Position: Sitting)   Pulse 94   Resp 18   Ht 5' 2\" (1.575 m)   Wt 116 lb (52.6 kg)   SpO2 96%   BMI 21.22 kg/m²
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
## 2020-08-03 DIAGNOSIS — G43.709 CHRONIC MIGRAINE WITHOUT AURA WITHOUT STATUS MIGRAINOSUS, NOT INTRACTABLE: ICD-10-CM

## 2020-08-05 RX ORDER — ERENUMAB-AOOE 140 MG/ML
INJECTION, SOLUTION SUBCUTANEOUS
Qty: 1 ML | Refills: 2 | Status: SHIPPED | OUTPATIENT
Start: 2020-08-05 | End: 2020-11-09 | Stop reason: SDUPTHER

## 2020-09-03 ENCOUNTER — OFFICE VISIT (OUTPATIENT)
Dept: NEUROLOGY | Age: 50
End: 2020-09-03
Payer: COMMERCIAL

## 2020-09-03 VITALS
SYSTOLIC BLOOD PRESSURE: 102 MMHG | DIASTOLIC BLOOD PRESSURE: 74 MMHG | RESPIRATION RATE: 17 BRPM | HEIGHT: 62 IN | TEMPERATURE: 97.3 F | OXYGEN SATURATION: 98 % | WEIGHT: 113 LBS | HEART RATE: 111 BPM | BODY MASS INDEX: 20.8 KG/M2

## 2020-09-03 DIAGNOSIS — F43.12 NIGHTMARES ASSOCIATED WITH CHRONIC POST-TRAUMATIC STRESS DISORDER: ICD-10-CM

## 2020-09-03 DIAGNOSIS — F41.9 ANXIETY: ICD-10-CM

## 2020-09-03 DIAGNOSIS — G43.709 CHRONIC MIGRAINE WITHOUT AURA WITHOUT STATUS MIGRAINOSUS, NOT INTRACTABLE: Primary | ICD-10-CM

## 2020-09-03 DIAGNOSIS — S49.91XD INJURY OF RIGHT SHOULDER, SUBSEQUENT ENCOUNTER: ICD-10-CM

## 2020-09-03 DIAGNOSIS — F51.5 NIGHTMARES ASSOCIATED WITH CHRONIC POST-TRAUMATIC STRESS DISORDER: ICD-10-CM

## 2020-09-03 DIAGNOSIS — M79.18 MYOFASCIAL PAIN ON RIGHT SIDE: ICD-10-CM

## 2020-09-03 PROCEDURE — 99213 OFFICE O/P EST LOW 20 MIN: CPT | Performed by: NURSE PRACTITIONER

## 2020-09-03 NOTE — PROGRESS NOTES
Date:  20     Name:  Luis Daugherty  :  1970  MRN:  [id number]     PCP:  Delio Solomon MD    Chief Complaint   Patient presents with    Migraine     HISTORY OF PRESENT ILLNESS: Follow-up for migraines. Since her last office visit, she has seen Dr. Nan Gann for pain management. She only just saw him yesterday. He wants to place her on medical marijuana, specifically CBD oils. His goal apparently is to try to get her off of most of her anxiety medications as well. As she really has not started any treatment for the chronic pain in her right shoulder, her migraines are about the same. She does feel that her migraines are better since being on the Ty Fuchs in that she has fewer intense headaches, down to abotu 15 per month, but they are still daily. Recap from last office visit: Overall, she is doing well with regard to migraines though they are still occurring about 15 times per month. These seem to be triggered by her shoulder pain for which she has been referred to pain management. She will likely improve once the shoulder pain is manageable. Continue with Aimovig. Anxiety is well managed with her present therapy and she will continue with her psychiatrist. Follow up in six months or sooner if needed. Current Outpatient Medications   Medication Sig    Aimovig Autoinjector 140 mg/mL injection ADMINISTER 1 ML UNDER THE SKIN EVERY 30 DAYS    venlafaxine-SR (EFFEXOR-XR) 150 mg capsule TAKE 1 CAPSULE BY MOUTH EVERY DAY    prazosin (MINIPRESS) 1 mg capsule TAKE 1 CAPSULE BY MOUTH EVERY NIGHT    escitalopram oxalate (LEXAPRO) 10 mg tablet TK 1 T PO QD    LORazepam (ATIVAN) 0.5 mg tablet TK 1 T PO QD PRN    esomeprazole (NEXIUM) 20 mg capsule Take 20 mg by mouth.  ibuprofen (MOTRIN) 600 mg tablet ibuprofen 600 mg tablet    naloxone (NARCAN) 4 mg/actuation nasal spray Use 1 spray intranasally, then discard.  Repeat with new spray every 2 min as needed for opioid overdose symptoms, alternating nostrils.  EPINEPHrine (EPIPEN) 0.3 mg/0.3 mL injection     medroxyPROGESTERone (DEPO-PROVERA) 150 mg/mL syrg 150 mg by IntraMUSCular route every three (3) months. No current facility-administered medications for this visit.       Allergies   Allergen Reactions    Benadryl [Diphenhydramine Hcl] Other (comments)     Able to tolerate IV benadryl but oral benadryl causes her to feel like \"things are coming out of my hands\"    Iodine Swelling     Face/hands    Shellfish Containing Products Swelling     Face    Sting, Bee Swelling     Past Medical History:   Diagnosis Date    Anxiety     With \"nervous tick\"    Calculus of kidney 2016    x 1     Claustrophobia     Fatigue     From an injury    GERD (gastroesophageal reflux disease)     Severe    Migraine     Nightmare     PTSD (post-traumatic stress disorder)     SVT (supraventricular tachycardia) ([facility] [address] 2015    None since ablation    Vertigo     Vitiligo      Past Surgical History:   Procedure Laterality Date    HX OTHER SURGICAL Right 01/31/2017    wrist surgery, cleaned out all of the scar tissue from an old accident     HX OTHER SURGICAL  10/2018    C4-C5 replaced with a titanium and cadivir bones     HX OTHER SURGICAL  03/27/2019    right shoulder- took out part of her collar bone, removed scar tissue     HX SVT ABLATION N/A 2015    DE EXCIS BARTHOLIN GLAND/CYST  2007    DE PELVIS/HIP JOINT SURGERY UNLISTED Left 1996    Piriformis Syndrome     Social History     Socioeconomic History    Marital status:      Spouse name: Not on file    Number of children: Not on file    Years of education: Not on file    Highest education level: Not on file   Occupational History    Not on file   Social Needs    Financial resource strain: Not on file    Food insecurity     Worry: Not on file     Inability: Not on file    Transportation needs     Medical: Not on file     Non-medical: Not on file   Tobacco Use    Smoking status: Never Smoker    Smokeless tobacco: Never Used   Substance and Sexual Activity    Alcohol use: No    Drug use: No    Sexual activity: Yes     Birth control/protection: Pill   Lifestyle    Physical activity     Days per week: Not on file     Minutes per session: Not on file    Stress: Not on file   Relationships    Social connections     Talks on phone: Not on file     Gets together: Not on file     Attends Judaism service: Not on file     Active member of club or organization: Not on file     Attends meetings of clubs or organizations: Not on file     Relationship status: Not on file    Intimate partner violence     Fear of current or ex partner: Not on file     Emotionally abused: Not on file     Physically abused: Not on file     Forced sexual activity: Not on file   Other Topics Concern    Not on file   Social History Narrative    Not on file     Family History   Problem Relation Age of Onset    Parkinsonism Father     Diabetes Father     Diabetes Brother     Pacemaker Brother         & Defibrallator    Cancer Mother         Breast    Heart Disease Mother         R/t Radiation treatment    Diabetes Mother     Anesth Problems Mother         Difficult intubation due to small traches       PHYSICAL EXAMINATION:    Visit Vitals  Temp 97.3 °F (36.3 °C)     General: Well defined, nourished, and groomed individual in no acute distress.    Neck: Supple, nontender, no bruits, no pain with resistance to active range of motion.    Heart: Regular rate and rhythm, no murmurs, rub, or gallop. Normal S1S2.    Lungs: Clear to auscultation bilaterally with equal chest expansion, no cough, no wheeze    Musculoskeletal: Extremities revealed no edema. Psych: Good mood and bright affect    NEUROLOGICAL EXAMINATION:    Mental Status: Alert and oriented to person, place, and time    Cranial Nerves:    II, III, IV, VI: Visual acuity grossly intact.  Visual fields are normal.    Pupils are equal, round, and reactive to light and accommodation.    Extra-ocular movements are full and fluid. Fundoscopic exam was benign, no ptosis or nystagmus.    V-XII: Hearing is grossly intact. Facial features are symmetric, with normal sensation and strength. The palate rises symmetrically and the tongue protrudes midline. Sternocleidomastoids 5/5.    Motor Examination: Normal tone, bulk, and strength, 5/5 muscle strength throughout.    Coordination: No resting or intention tremor    Gait and Station: Steady while walking. Normal arm swing. No muscle wasting or fasiculations noted.    Reflexes: DTRs 2+ throughout. ASSESSMENT AND PLAN    ICD-10-CM ICD-9-CM    1. Chronic migraine without aura without status migrainosus, not intractable  G43.709 346.70    2. Anxiety  F41.9 300.00    3. Nightmares associated with chronic post-traumatic stress disorder  F51.5 307.47     F43.10 309.81    4. Injury of right shoulder, subsequent encounter  S49. 91XD V58.89      959.2    5. Myofascial pain on right side  M79.18 729.1      At this point, not tremendous amount is changed with regard to her migraine headaches. She only saw the pain management specialist yesterday so has not started any treatment for her chronic pain related to her right shoulder injury. Since the Ivin Jody does seem to keep the intensity to a minimum, she will continue with this she does feel that this is helped. I would like to see how she does once she has actually started the pain management regimen. I suspect that there will be an even better management of her migraines once that is controlled. Follow-up in 3 months. Gerda Brasher

## 2020-11-09 DIAGNOSIS — G43.709 CHRONIC MIGRAINE WITHOUT AURA WITHOUT STATUS MIGRAINOSUS, NOT INTRACTABLE: ICD-10-CM

## 2020-11-09 RX ORDER — ERENUMAB-AOOE 140 MG/ML
INJECTION, SOLUTION SUBCUTANEOUS
Qty: 1 ML | Refills: 5 | Status: SHIPPED | OUTPATIENT
Start: 2020-11-09 | End: 2021-05-19 | Stop reason: SDUPTHER

## 2020-12-03 ENCOUNTER — OFFICE VISIT (OUTPATIENT)
Dept: NEUROLOGY | Age: 50
End: 2020-12-03
Payer: COMMERCIAL

## 2020-12-03 VITALS
RESPIRATION RATE: 20 BRPM | DIASTOLIC BLOOD PRESSURE: 68 MMHG | HEART RATE: 98 BPM | SYSTOLIC BLOOD PRESSURE: 108 MMHG | TEMPERATURE: 96.9 F | OXYGEN SATURATION: 98 %

## 2020-12-03 DIAGNOSIS — S49.91XD INJURY OF RIGHT SHOULDER, SUBSEQUENT ENCOUNTER: ICD-10-CM

## 2020-12-03 DIAGNOSIS — T39.95XA ANALGESIC REBOUND HEADACHE: ICD-10-CM

## 2020-12-03 DIAGNOSIS — M79.18 MYOFASCIAL PAIN ON RIGHT SIDE: ICD-10-CM

## 2020-12-03 DIAGNOSIS — F43.12 NIGHTMARES ASSOCIATED WITH CHRONIC POST-TRAUMATIC STRESS DISORDER: ICD-10-CM

## 2020-12-03 DIAGNOSIS — G43.709 CHRONIC MIGRAINE WITHOUT AURA WITHOUT STATUS MIGRAINOSUS, NOT INTRACTABLE: Primary | ICD-10-CM

## 2020-12-03 DIAGNOSIS — F41.9 ANXIETY: ICD-10-CM

## 2020-12-03 DIAGNOSIS — F51.5 NIGHTMARES ASSOCIATED WITH CHRONIC POST-TRAUMATIC STRESS DISORDER: ICD-10-CM

## 2020-12-03 DIAGNOSIS — G44.40 ANALGESIC REBOUND HEADACHE: ICD-10-CM

## 2020-12-03 PROCEDURE — 99214 OFFICE O/P EST MOD 30 MIN: CPT | Performed by: NURSE PRACTITIONER

## 2020-12-03 RX ORDER — RIMEGEPANT SULFATE 75 MG/75MG
75 TABLET, ORALLY DISINTEGRATING ORAL
Qty: 2 TAB | Refills: 0 | Status: SHIPPED | COMMUNITY
Start: 2020-12-03 | End: 2020-12-03

## 2020-12-03 RX ORDER — PREDNISONE 10 MG/1
TABLET ORAL
Qty: 42 TAB | Refills: 0 | Status: SHIPPED | OUTPATIENT
Start: 2020-12-03 | End: 2021-07-21 | Stop reason: ALTCHOICE

## 2020-12-03 RX ORDER — RIMEGEPANT SULFATE 75 MG/75MG
TABLET, ORALLY DISINTEGRATING ORAL
Qty: 8 TAB | Refills: 3 | Status: SHIPPED | OUTPATIENT
Start: 2020-12-03 | End: 2021-10-21 | Stop reason: ALTCHOICE

## 2020-12-03 NOTE — PROGRESS NOTES
Date:  20     Name:  Shila Levy  :  1970  MRN:  [id number]     PCP:  Devon Florence MD    Chief Complaint   Patient presents with    Follow-up     migraine      HISTORY OF PRESENT ILLNESS: Follow-up for migraines. Since her last office visit, she has seen Dr. Gauri Rodriguez for pain management. In addition to the CBD oils that he prescribed, she has also picked up some additional therapies from a CBD shop. She does feel that the is topical agents that she picked up seem to help quite a bit. She does continue to have chronic pain in her right shoulder and her migraines seem to have been worse over the last month or so. She is still having daily headaches and frequent migraines. Has a result of the headaches, she has been taking Excedrin every single day, sometimes multiple times per day. Recap from last office visit: At this point, not tremendous amount is changed with regard to her migraine headaches. She only saw the pain management specialist yesterday so has not started any treatment for her chronic pain related to her right shoulder injury. Since the Gypsy Rung does seem to keep the intensity to a minimum, she will continue with this she does feel that this is helped. I would like to see how she does once she has actually started the pain management regimen. I suspect that there will be an even better management of her migraines once that is controlled. Follow-up in 3 months.      Current Outpatient Medications   Medication Sig    OTHER CBD Oil and cream    erenumab-aooe (Aimovig Autoinjector) 140 mg/mL injection ADMINISTER 1 ML UNDER THE SKIN EVERY 30 DAYS  Indications: migraine prevention    venlafaxine-SR (EFFEXOR-XR) 150 mg capsule TAKE 1 CAPSULE BY MOUTH EVERY DAY    prazosin (MINIPRESS) 1 mg capsule TAKE 1 CAPSULE BY MOUTH EVERY NIGHT (Patient taking differently: 2 mg.)    escitalopram oxalate (LEXAPRO) 10 mg tablet TK 1 T PO QD    LORazepam (ATIVAN) 0.5 mg tablet 1 mg.    esomeprazole (NEXIUM) 20 mg capsule Take 20 mg by mouth.  ibuprofen (MOTRIN) 600 mg tablet 800 mg.    naloxone (NARCAN) 4 mg/actuation nasal spray Use 1 spray intranasally, then discard. Repeat with new spray every 2 min as needed for opioid overdose symptoms, alternating nostrils.  EPINEPHrine (EPIPEN) 0.3 mg/0.3 mL injection     medroxyPROGESTERone (DEPO-PROVERA) 150 mg/mL syrg 150 mg by IntraMUSCular route every three (3) months. No current facility-administered medications for this visit.       Allergies   Allergen Reactions    Benadryl [Diphenhydramine Hcl] Other (comments)     Able to tolerate IV benadryl but oral benadryl causes her to feel like \"things are coming out of my hands\"    Iodine Swelling     Face/hands    Shellfish Containing Products Swelling     Face    Sting, Bee Swelling     Past Medical History:   Diagnosis Date    Anxiety     With \"nervous tick\"    Calculus of kidney 2016    x 1     Claustrophobia     Fatigue     From an injury    GERD (gastroesophageal reflux disease)     Severe    Migraine     Nightmare     PTSD (post-traumatic stress disorder)     [facility]    None since ablation    Vertigo     Vitiligo      Past Surgical History:   Procedure Laterality Date    HX OTHER SURGICAL Right 01/31/2017    wrist surgery, cleaned out all of the scar tissue from an old accident     HX OTHER SURGICAL  10/2018    C4-C5 replaced with a titanium and cadivir bones     HX OTHER SURGICAL  03/27/2019    right shoulder- took out part of her collar bone, removed scar tissue     HX SVT ABLATION N/A 2015    LA EXCIS BARTHOLIN GLAND/CYST  2007    LA PELVIS/HIP JOINT SURGERY UNLISTED Left 1996    Piriformis Syndrome     Social History     Socioeconomic History    Marital status:      Spouse name: Not on file    Number of children: Not on file    Years of education: Not on file    Highest education level: Not on file Occupational History    Not on file   Social Needs    Financial resource strain: Not on file    Food insecurity     Worry: Not on file     Inability: Not on file    Transportation needs     Medical: Not on file     Non-medical: Not on file   Tobacco Use    Smoking status: Never Smoker    Smokeless tobacco: Never Used   Substance and Sexual Activity    Alcohol use: No    Drug use: No    Sexual activity: Yes     Birth control/protection: Pill   Lifestyle    Physical activity     Days per week: Not on file     Minutes per session: Not on file    Stress: Not on file   Relationships    Social connections     Talks on phone: Not on file     Gets together: Not on file     Attends Hoahaoism service: Not on file     Active member of club or organization: Not on file     Attends meetings of clubs or organizations: Not on file     Relationship status: Not on file    Intimate partner violence     Fear of current or ex partner: Not on file     Emotionally abused: Not on file     Physically abused: Not on file     Forced sexual activity: Not on file   Other Topics Concern    Not on file   Social History Narrative    Not on file     Family History   Problem Relation Age of Onset    Parkinsonism Father     Diabetes Father     Diabetes Brother     Pacemaker Brother         & Defibrallator    Cancer Mother         Breast    Heart Disease Mother         R/t Radiation treatment    Diabetes Mother     Anesth Problems Mother         Difficult intubation due to small traches       PHYSICAL EXAMINATION:    Visit Vitals  /68   Pulse 98   Temp 96.9 °F (36.1 °C)   Resp 20   SpO2 98%     General: Well defined, nourished, and groomed individual in no acute distress.    Neck: Supple, nontender, no bruits, no pain with resistance to active range of motion.    Heart: Regular rate and rhythm, no murmurs, rub, or gallop.  Normal S1S2.    Lungs: Clear to auscultation bilaterally with equal chest expansion, no cough, no wheeze    Musculoskeletal: Extremities revealed no edema. Psych: Good mood and bright affect    NEUROLOGICAL EXAMINATION:    Mental Status: Alert and oriented to person, place, and time    Cranial Nerves:    II, III, IV, VI: Visual acuity grossly intact. Visual fields are normal.    Pupils are equal, round, and reactive to light and accommodation.    Extra-ocular movements are full and fluid. Fundoscopic exam was benign, no ptosis or nystagmus.    V-XII: Hearing is grossly intact. Facial features are symmetric, with normal sensation and strength. The palate rises symmetrically and the tongue protrudes midline. Sternocleidomastoids 5/5.    Motor Examination: Normal tone, bulk, and strength, 5/5 muscle strength throughout.    Coordination: No resting or intention tremor    Gait and Station: Steady while walking. Normal arm swing. No muscle wasting or fasiculations noted.    Reflexes: DTRs 2+ throughout. ASSESSMENT AND PLAN    ICD-10-CM ICD-9-CM    1. Chronic migraine without aura without status migrainosus, not intractable  G43.709 346.70 rimegepant (Nurtec ODT) 75 mg disintegrating tablet      rimegepant (Nurtec ODT) 75 mg disintegrating tablet   2. Anxiety  F41.9 300.00    3. Nightmares associated with chronic post-traumatic stress disorder  F51.5 307.47     F43.10 309.81    4. Injury of right shoulder, subsequent encounter  S49. 91XD V58.89      959.2    5. Myofascial pain on right side  M79.18 729.1    6. Analgesic rebound headache  G44.40 339.3 predniSONE (DELTASONE) 10 mg tablet    T39.95XA E935.9      At this point, I do believe that she has at least some component of analgesic rebound headache as her headaches had actually improved somewhat after starting the Aimovig. The myofascial pain on the right side is somewhat better though still present. Recommended she stop taking the Excedrin Migraine. She will continue taking Aimovig.   She was placed on a prednisone taper to help dampen the analgesic rebound. Since she does not have anything that is significantly beneficial with regard to acute abortive therapy which oftentimes is what leads to her analgesic rebound, we did discuss the use of Ubrelvy versus Nurtec ODT. She was provided with Nurtec ODT to try. Purpose of potential side effects were discussed and she has verbalized understanding. Follow-up in 3 months or sooner if needed. Gerda Deluca Parents

## 2020-12-03 NOTE — PROGRESS NOTES
Headaches- this month have been kicking her butt, has been taking the excedrin migraine more than she would like about 2 a day   She doesn't know what else to try right now

## 2021-01-18 DIAGNOSIS — G43.709 CHRONIC MIGRAINE WITHOUT AURA WITHOUT STATUS MIGRAINOSUS, NOT INTRACTABLE: ICD-10-CM

## 2021-01-18 DIAGNOSIS — F41.9 ANXIETY: ICD-10-CM

## 2021-01-18 RX ORDER — VENLAFAXINE HYDROCHLORIDE 150 MG/1
CAPSULE, EXTENDED RELEASE ORAL
Qty: 90 CAP | Refills: 3 | Status: SHIPPED | OUTPATIENT
Start: 2021-01-18 | End: 2021-10-26 | Stop reason: SDUPTHER

## 2021-03-29 ENCOUNTER — OFFICE VISIT (OUTPATIENT)
Dept: NEUROLOGY | Age: 51
End: 2021-03-29
Payer: COMMERCIAL

## 2021-03-29 VITALS
RESPIRATION RATE: 17 BRPM | HEART RATE: 105 BPM | OXYGEN SATURATION: 98 % | DIASTOLIC BLOOD PRESSURE: 70 MMHG | BODY MASS INDEX: 21.35 KG/M2 | SYSTOLIC BLOOD PRESSURE: 107 MMHG | WEIGHT: 116 LBS | TEMPERATURE: 98.1 F | HEIGHT: 62 IN

## 2021-03-29 DIAGNOSIS — T39.95XA ANALGESIC REBOUND HEADACHE: ICD-10-CM

## 2021-03-29 DIAGNOSIS — G44.40 ANALGESIC REBOUND HEADACHE: ICD-10-CM

## 2021-03-29 DIAGNOSIS — F41.9 ANXIETY: ICD-10-CM

## 2021-03-29 DIAGNOSIS — F43.12 NIGHTMARES ASSOCIATED WITH CHRONIC POST-TRAUMATIC STRESS DISORDER: ICD-10-CM

## 2021-03-29 DIAGNOSIS — G43.709 CHRONIC MIGRAINE WITHOUT AURA WITHOUT STATUS MIGRAINOSUS, NOT INTRACTABLE: Primary | ICD-10-CM

## 2021-03-29 DIAGNOSIS — F51.5 NIGHTMARES ASSOCIATED WITH CHRONIC POST-TRAUMATIC STRESS DISORDER: ICD-10-CM

## 2021-03-29 DIAGNOSIS — S49.91XD INJURY OF RIGHT SHOULDER, SUBSEQUENT ENCOUNTER: ICD-10-CM

## 2021-03-29 DIAGNOSIS — M79.18 MYOFASCIAL PAIN ON RIGHT SIDE: ICD-10-CM

## 2021-03-29 PROCEDURE — 99214 OFFICE O/P EST MOD 30 MIN: CPT | Performed by: NURSE PRACTITIONER

## 2021-03-29 NOTE — PROGRESS NOTES
Date:  21     Name:  Cristóbal Acuna  :  1970  MRN:  [id number]     PCP:  Joann Brown MD    Chief Complaint   Patient presents with    Migraine     HISTORY OF PRESENT ILLNESS: Follow-up for migraines. At her last office visit, she was instructed to discontinue the Excedrin Migraine as it was felt that she was developing a component of analgesic rebound. She was also given Nurtec ODT for acute abortive therapy. While this does work, it makes her feel tired. Down to about 10 migraines per month now. If she can manage the pain in her shoulder, the headaches are better. Recap from last office visit: At this point, I do believe that she has at least some component of analgesic rebound headache as her headaches had actually improved somewhat after starting the [address]. The myofascial pain on the right side is somewhat better though still present. Recommended she stop taking the Excedrin Migraine. She will continue taking Aimovig. She was placed on a prednisone taper to help dampen the analgesic rebound. Since she does not have anything that is significantly beneficial with regard to acute abortive therapy which oftentimes is what leads to her analgesic rebound, we did discuss the use of Ubrelvy versus Nurtec ODT. She was provided with Nurtec ODT to try. Purpose of potential side effects were discussed and she has verbalized understanding. Follow-up in 3 months or sooner if needed. Current Outpatient Medications   Medication Sig    venlafaxine-SR (EFFEXOR-XR) 150 mg capsule TAKE 1 CAPSULE BY MOUTH EVERY DAY    OTHER CBD Oil and cream    rimegepant (Nurtec ODT) 75 mg disintegrating tablet Take one po at onset of migraine.  Max of ONE dose in 24 hours    erenumab-aooe (Aimovig Autoinjector) 140 mg/mL injection ADMINISTER 1 ML UNDER THE SKIN EVERY 30 DAYS  Indications: migraine prevention    prazosin (MINIPRESS) 1 mg capsule TAKE 1 CAPSULE BY MOUTH EVERY NIGHT (Patient taking differently: 2 mg.)    escitalopram oxalate (LEXAPRO) 10 mg tablet TK 1 T PO QD    LORazepam (ATIVAN) 0.5 mg tablet 1 mg.  esomeprazole (NEXIUM) 20 mg capsule Take 20 mg by mouth.  ibuprofen (MOTRIN) 600 mg tablet 800 mg.    naloxone (NARCAN) 4 mg/actuation nasal spray Use 1 spray intranasally, then discard. Repeat with new spray every 2 min as needed for opioid overdose symptoms, alternating nostrils.  EPINEPHrine (EPIPEN) 0.3 mg/0.3 mL injection     medroxyPROGESTERone (DEPO-PROVERA) 150 mg/mL syrg 150 mg by IntraMUSCular route every three (3) months.  predniSONE (DELTASONE) 10 mg tablet 6 po x2 days, 5 po x 2days, 4 po x 2days, 3 po x2days, 2 po x2days, 1 po x 2days     No current facility-administered medications for this visit.       Allergies   Allergen Reactions    Benadryl [Diphenhydramine Hcl] Other (comments)     Able to tolerate IV benadryl but oral benadryl causes her to feel like \"things are coming out of my hands\"    Iodine Swelling     Face/hands    Shellfish Containing Products Swelling     Face    Sting, Bee Swelling     Past Medical History:   Diagnosis Date    Anxiety     With \"nervous tick\"    Calculus of kidney 2016    x 1     Claustrophobia     Fatigue     From an injury    GERD (gastroesophageal reflux disease)     Severe    Migraine     Nightmare     PTSD (post-traumatic stress disorder)     SVT (supraventricular tachycardia) ([facility] [address] 2015    None since ablation    Vertigo     Vitiligo      Past Surgical History:   Procedure Laterality Date    HX OTHER SURGICAL Right 01/31/2017    wrist surgery, cleaned out all of the scar tissue from an old accident     HX OTHER SURGICAL  10/2018    C4-C5 replaced with a titanium and cadivir bones     HX OTHER SURGICAL  03/27/2019    right shoulder- took out part of her collar bone, removed scar tissue     HX SVT ABLATION N/A 2015    NM EXCIS BARTHOLIN GLAND/CYST  2007    NM PELVIS/HIP JOINT SURGERY UNLISTED Left 1996 Piriformis Syndrome     Social History     Socioeconomic History    Marital status:      Spouse name: Not on file    Number of children: Not on file    Years of education: Not on file    Highest education level: Not on file   Occupational History    Not on file   Social Needs    Financial resource strain: Not on file    Food insecurity     Worry: Not on file     Inability: Not on file    Transportation needs     Medical: Not on file     Non-medical: Not on file   Tobacco Use    Smoking status: Never Smoker    Smokeless tobacco: Never Used   Substance and Sexual Activity    Alcohol use: No    Drug use: No    Sexual activity: Yes     Birth control/protection: Pill   Lifestyle    Physical activity     Days per week: Not on file     Minutes per session: Not on file    Stress: Not on file   Relationships    Social connections     Talks on phone: Not on file     Gets together: Not on file     Attends Islam service: Not on file     Active member of club or organization: Not on file     Attends meetings of clubs or organizations: Not on file     Relationship status: Not on file    Intimate partner violence     Fear of current or ex partner: Not on file     Emotionally abused: Not on file     Physically abused: Not on file     Forced sexual activity: Not on file   Other Topics Concern    Not on file   Social History Narrative    Not on file     Family History   Problem Relation Age of Onset    Parkinsonism Father     Diabetes Father     Diabetes Brother     Pacemaker Brother         & Defibrallator    Cancer Mother         Breast    Heart Disease Mother         R/t Radiation treatment    Diabetes Mother     Anesth Problems Mother         Difficult intubation due to small traches       PHYSICAL EXAMINATION:    Visit Vitals  /70   Pulse (!) 105   Temp 98.1 °F (36.7 °C)   Resp 17   Ht 5' 2\" (1.575 m)   Wt 52.6 kg (116 lb)   SpO2 98%   BMI 21.22 kg/m²     General: Well defined, nourished, and groomed individual in no acute distress.    Neck: Supple, nontender, no bruits, no pain with resistance to active range of motion.    Heart: Regular rate and rhythm, no murmurs, rub, or gallop. Normal S1S2.    Lungs: Clear to auscultation bilaterally with equal chest expansion, no cough, no wheeze    Musculoskeletal: Extremities revealed no edema. Psych: Good mood and bright affect    NEUROLOGICAL EXAMINATION:    Mental Status: Alert and oriented to person, place, and time    Cranial Nerves:    II, III, IV, VI: Visual acuity grossly intact. Visual fields are normal.    Pupils are equal, round, and reactive to light and accommodation.    Extra-ocular movements are full and fluid. Fundoscopic exam was benign, no ptosis or nystagmus.    V-XII: Hearing is grossly intact. Facial features are symmetric, with normal sensation and strength. The palate rises symmetrically and the tongue protrudes midline. Sternocleidomastoids 5/5.    Motor Examination: Normal tone, bulk, and strength, 5/5 muscle strength throughout.    Coordination: No resting or intention tremor    Gait and Station: Steady while walking. Normal arm swing. No muscle wasting or fasiculations noted.    Reflexes: DTRs 2+ throughout. ASSESSMENT AND PLAN    ICD-10-CM ICD-9-CM    1. Chronic migraine without aura without status migrainosus, not intractable  G43.709 346.70 ubrogepant (Ubrelvy) 50 mg tablet      ubrogepant (Ubrelvy) 50 mg tablet   2. Anxiety  F41.9 300.00    3. Nightmares associated with chronic post-traumatic stress disorder  F51.5 307.47     F43.10 309.81    4. Injury of right shoulder, subsequent encounter  S49. 91XD V58.89      959.2    5. Myofascial pain on right side  M79.18 729.1    6. Analgesic rebound headache  G44.40 339.3     T39.95XA E935.9      Analgesic rebound headaches with underlying migraine is improved since discontinuation Excedrin Migraine.   Given that the Nurtec ODT made her incredibly tired, she was provided with samples of Ubrelvy. If this makes her tired like the Nurtec ODT. She will let me know. We may try Reyvow instead. The nightmares have been well managed with the Minipress. Her anxiety is fairly well managed with Lexapro and Ativan. She does continue to follow-up with pain management for her myofascial pain related to her right shoulder injury. Follow-up in 3 months or sooner if needed. Gerda Gomez

## 2021-05-19 DIAGNOSIS — G43.709 CHRONIC MIGRAINE WITHOUT AURA WITHOUT STATUS MIGRAINOSUS, NOT INTRACTABLE: ICD-10-CM

## 2021-05-19 RX ORDER — ERENUMAB-AOOE 140 MG/ML
INJECTION, SOLUTION SUBCUTANEOUS
Qty: 1 ML | Refills: 5 | Status: SHIPPED | OUTPATIENT
Start: 2021-05-19 | End: 2021-11-23 | Stop reason: SDUPTHER

## 2021-07-21 ENCOUNTER — OFFICE VISIT (OUTPATIENT)
Dept: NEUROLOGY | Age: 51
End: 2021-07-21
Payer: COMMERCIAL

## 2021-07-21 VITALS
BODY MASS INDEX: 20.91 KG/M2 | WEIGHT: 118 LBS | HEIGHT: 63 IN | SYSTOLIC BLOOD PRESSURE: 124 MMHG | RESPIRATION RATE: 18 BRPM | OXYGEN SATURATION: 97 % | HEART RATE: 85 BPM | DIASTOLIC BLOOD PRESSURE: 82 MMHG

## 2021-07-21 DIAGNOSIS — F41.9 ANXIETY: ICD-10-CM

## 2021-07-21 DIAGNOSIS — F51.5 NIGHTMARES ASSOCIATED WITH CHRONIC POST-TRAUMATIC STRESS DISORDER: ICD-10-CM

## 2021-07-21 DIAGNOSIS — G43.709 CHRONIC MIGRAINE WITHOUT AURA WITHOUT STATUS MIGRAINOSUS, NOT INTRACTABLE: Primary | ICD-10-CM

## 2021-07-21 DIAGNOSIS — S49.91XD INJURY OF RIGHT SHOULDER, SUBSEQUENT ENCOUNTER: ICD-10-CM

## 2021-07-21 DIAGNOSIS — M79.18 MYOFASCIAL PAIN ON RIGHT SIDE: ICD-10-CM

## 2021-07-21 DIAGNOSIS — F43.12 NIGHTMARES ASSOCIATED WITH CHRONIC POST-TRAUMATIC STRESS DISORDER: ICD-10-CM

## 2021-07-21 PROCEDURE — 99214 OFFICE O/P EST MOD 30 MIN: CPT | Performed by: NURSE PRACTITIONER

## 2021-07-21 NOTE — PROGRESS NOTES
Chief Complaint   Patient presents with    Headache     migraine are doing better/ long work day tension in neck/ overwhelmed at times      Visit Vitals  /82   Pulse 85   Resp 18   Ht 5' 2.5\" (1.588 m)   Wt 53.5 kg (118 lb)   SpO2 97%   BMI 21.24 kg/m²
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
## 2021-07-26 ENCOUNTER — TELEPHONE (OUTPATIENT)
Dept: NEUROLOGY | Age: 51
End: 2021-07-26

## 2021-07-26 NOTE — TELEPHONE ENCOUNTER
----- Message from Suhas Orosco sent at 7/26/2021 12:38 PM EDT -----  Regarding: FUAD Tapia/Telephone  Raquel Holley with [facility] and Shantelle Miner, is requesting a call from Unique Osorio, to confirm if a meeting will be set up for pt with FUAD Handy. Gagandeep's best contact number [phone].

## 2021-07-27 NOTE — TELEPHONE ENCOUNTER
----- Message from Baldo Amador sent at 7/27/2021 10:56 AM EDT -----  Regarding: FUAD Tapia/telephone  General Message/Vendor Calls    Caller's first and last name: Mayda Michaels      Reason for call:Meeting with FUAD Corok required yes/no and why:Yes      Best contact number(s):[phone]       Details to clarify the request:Gagandeep has been waiting for a call back from Logan Brunner in regards to scheduling a meeting with FUAD Saldivar since June 24th,2021. The deadline is coming up on July 30th, this Friday.       Baldo Amador

## 2021-10-21 ENCOUNTER — OFFICE VISIT (OUTPATIENT)
Dept: NEUROLOGY | Age: 51
End: 2021-10-21
Payer: COMMERCIAL

## 2021-10-21 VITALS
DIASTOLIC BLOOD PRESSURE: 70 MMHG | RESPIRATION RATE: 16 BRPM | HEART RATE: 82 BPM | WEIGHT: 118 LBS | HEIGHT: 62 IN | BODY MASS INDEX: 21.71 KG/M2 | OXYGEN SATURATION: 98 % | SYSTOLIC BLOOD PRESSURE: 108 MMHG | TEMPERATURE: 97.5 F

## 2021-10-21 DIAGNOSIS — G43.709 CHRONIC MIGRAINE WITHOUT AURA WITHOUT STATUS MIGRAINOSUS, NOT INTRACTABLE: Primary | ICD-10-CM

## 2021-10-21 DIAGNOSIS — S49.91XD INJURY OF RIGHT SHOULDER, SUBSEQUENT ENCOUNTER: ICD-10-CM

## 2021-10-21 DIAGNOSIS — F41.9 ANXIETY: ICD-10-CM

## 2021-10-21 DIAGNOSIS — F51.5 NIGHTMARES ASSOCIATED WITH CHRONIC POST-TRAUMATIC STRESS DISORDER: ICD-10-CM

## 2021-10-21 DIAGNOSIS — G50.0 SUPRAORBITAL NEURALGIA: ICD-10-CM

## 2021-10-21 DIAGNOSIS — M54.81 OCCIPITAL NEURALGIA OF RIGHT SIDE: ICD-10-CM

## 2021-10-21 DIAGNOSIS — M79.18 MYOFASCIAL PAIN ON RIGHT SIDE: ICD-10-CM

## 2021-10-21 DIAGNOSIS — F43.12 NIGHTMARES ASSOCIATED WITH CHRONIC POST-TRAUMATIC STRESS DISORDER: ICD-10-CM

## 2021-10-21 PROCEDURE — 99214 OFFICE O/P EST MOD 30 MIN: CPT | Performed by: NURSE PRACTITIONER

## 2021-10-21 RX ORDER — ACETAMINOPHEN AND CODEINE PHOSPHATE 300; 30 MG/1; MG/1
TABLET ORAL
COMMUNITY
Start: 2021-10-06

## 2021-10-21 RX ORDER — PREDNISONE 10 MG/1
TABLET ORAL
Qty: 42 TABLET | Refills: 0 | Status: SHIPPED | OUTPATIENT
Start: 2021-10-21

## 2021-10-21 NOTE — PROGRESS NOTES
Date:  10/21/21     Name:  Reyes Gutter  :  1970  MRN:  [id number]     PCP:  Azael Mas MD    Chief Complaint   Patient presents with    Migraine     R shoulder and neck pain causes migraines // 2 week R temple pain      HISTORY OF PRESENT ILLNESS: Follow-up for migraines. For the last week or so, she has been having a sharp shooting pain in the supraorbital and occipital areas of the right side of her head. This will come and go throughout the day and can occur as often as every few minutes. Otherwise, she continues to have significant right shoulder and neck pain. She continues with pain management for this issue. This does seem to trigger her migraines. Currently, she is on Aimovig 140 mg monthly for migraine prevention which does seem to help. Orelia Iha has been beneficial for acute abortive therapy. Recap from last office visit: Chronic migraine with some improvement with use of Aimovig. Orelia Iha seems to work well for acute abortive treatment. She continues to have a lot of myofascial pain in the right shoulder with increased muscle spasm/tick which is more noticeable on exam today. She is seeing pain management now for the pain in the right shoulder and neck which is a direct result of the accident in 2016. The ongoing pain and anxiety/PTSD I think is causing more chronicity with the migraines at this point. Follow up in six months. Current Outpatient Medications   Medication Sig    acetaminophen-codeine (TYLENOL #3) 300-30 mg per tablet     OTHER Apply  to affected area daily. Indications: Salve GLeaf / shoulders    OTHER,NON-FORMULARY, Take 80.5 mg by mouth daily. Indications: Taking medical Mariguana gummie at this time only taking 1 per day    ubrogepant Jd Melendez) 50 mg tablet Take 1 Tablet by mouth as needed for Migraine. May repeat dose x1 if needed.  Max of two doses in 24 hours    erenumab-aooe (Aimovig Autoinjector) 140 mg/mL injection ADMINISTER 1 ML UNDER THE SKIN EVERY 30 DAYS  Indications: migraine prevention    venlafaxine-SR (EFFEXOR-XR) 150 mg capsule TAKE 1 CAPSULE BY MOUTH EVERY DAY    OTHER CBD Oil and cream    prazosin (MINIPRESS) 1 mg capsule TAKE 1 CAPSULE BY MOUTH EVERY NIGHT (Patient taking differently: 2 mg.)    escitalopram oxalate (LEXAPRO) 10 mg tablet TK 1 T PO QD    LORazepam (ATIVAN) 0.5 mg tablet 1 mg.  esomeprazole (NEXIUM) 20 mg capsule Take 20 mg by mouth.  ibuprofen (MOTRIN) 600 mg tablet 800 mg.    EPINEPHrine (EPIPEN) 0.3 mg/0.3 mL injection     medroxyPROGESTERone (DEPO-PROVERA) 150 mg/mL syrg 150 mg by IntraMUSCular route every three (3) months. No current facility-administered medications for this visit.      Allergies   Allergen Reactions    Benadryl [Diphenhydramine Hcl] Other (comments)     Able to tolerate IV benadryl but oral benadryl causes her to feel like \"things are coming out of my hands\"    Iodine Swelling     Face/hands    Shellfish Containing Products Swelling     Face    Sting, Bee Swelling     Past Medical History:   Diagnosis Date    Anxiety     With \"nervous tick\"    Calculus of kidney 2016    x 1     Claustrophobia     Fatigue     From an injury    GERD (gastroesophageal reflux disease)     Severe    Migraine     Nightmare     PTSD (post-traumatic stress disorder)     [facility]    None since ablation    Vertigo     Vitiligo      Past Surgical History:   Procedure Laterality Date    HX OTHER SURGICAL Right 01/31/2017    wrist surgery, cleaned out all of the scar tissue from an old accident     HX OTHER SURGICAL  10/2018    C4-C5 replaced with a titanium and cadivir bones     HX OTHER SURGICAL  03/27/2019    right shoulder- took out part of her collar bone, removed scar tissue     HX SVT ABLATION N/A 2015    MI EXCIS BARTHOLIN GLAND/CYST  2007    MI PELVIS/HIP JOINT SURGERY UNLISTED Left 1996    Piriformis Syndrome     Social History Socioeconomic History    Marital status:      Spouse name: Not on file    Number of children: Not on file    Years of education: Not on file    Highest education level: Not on file   Occupational History    Not on file   Tobacco Use    Smoking status: Never Smoker    Smokeless tobacco: Never Used   Substance and Sexual Activity    Alcohol use: No    Drug use: No    Sexual activity: Yes     Birth control/protection: Pill   Other Topics Concern    Not on file   Social History Narrative    Not on file     Social Determinants of Health     Financial Resource Strain:     Difficulty of Paying Living Expenses:    Food Insecurity:     Worried About Running Out of Food in the Last Year:     [address] in the Last Year:    Transportation Needs:     Lack of Transportation (Medical):      Lack of Transportation (Non-Medical):    Physical Activity:     Days of Exercise per Week:     Minutes of Exercise per Session:    Stress:     Feeling of Stress :    Social Connections:     Frequency of Communication with Friends and Family:     Frequency of Social Gatherings with Friends and Family:     Attends Jainism Services:     Active Member of Clubs or Organizations:     Attends Club or Organization Meetings:     Marital Status:    Intimate Partner Violence:     Fear of Current or Ex-Partner:     Emotionally Abused:     Physically Abused:     Sexually Abused:      Family History   Problem Relation Age of Onset    Parkinsonism Father     Diabetes Father     Diabetes Brother     Pacemaker Brother         & Defibrallator    Cancer Mother         Breast    Heart Disease Mother         R/t Radiation treatment    Diabetes Mother    Penelope Gregory Problems Mother         Difficult intubation due to small traches       PHYSICAL EXAMINATION:    Visit Vitals  /70 (BP 1 Location: Left upper arm, BP Patient Position: Sitting)   Pulse 82   Temp 97.5 °F (36.4 °C) (Temporal)   Resp 16   Ht 5' 2\" (1.575 m)   Wt 53.5 kg (118 lb)   SpO2 98%   BMI 21.58 kg/m²     General: Well defined, nourished, and groomed individual in no acute distress.    Neck: Supple, nontender, no bruits, no pain with resistance to active range of motion.    Heart: Regular rate and rhythm, no murmurs, rub, or gallop. Normal S1S2.    Lungs: Clear to auscultation bilaterally with equal chest expansion, no cough, no wheeze    Musculoskeletal: Extremities revealed no edema. Psych: Good mood and bright affect    NEUROLOGICAL EXAMINATION:    Mental Status: Alert and oriented to person, place, and time    Cranial Nerves:    II, III, IV, VI: Visual acuity grossly intact. Visual fields are normal.    Pupils are equal, round, and reactive to light and accommodation.    Extra-ocular movements are full and fluid. Fundoscopic exam was benign, no ptosis or nystagmus.    V-XII: Hearing is grossly intact. Facial features are symmetric, with normal sensation and strength. The palate rises symmetrically and the tongue protrudes midline. Sternocleidomastoids 5/5.    Motor Examination: Normal tone, bulk, and strength, 5/5 muscle strength throughout.    Coordination: No resting or intention tremor    Gait and Station: Steady while walking. Normal arm swing. No muscle wasting or fasiculations noted.    Reflexes: DTRs 2+ throughout. ASSESSMENT AND PLAN    ICD-10-CM ICD-9-CM    1. Chronic migraine without aura without status migrainosus, not intractable  G43.709 346.70    2. Nightmares associated with chronic post-traumatic stress disorder  F51.5 307.47     F43.12 309.81    3. Injury of right shoulder, subsequent encounter  S49. 91XD V58.89      959.2    4. Myofascial pain on right side  M79.18 729.1    5. Anxiety  F41.9 300.00    6. Supraorbital neuralgia  G50.0 350. 1 predniSONE (DELTASONE) 10 mg tablet   7.  Occipital neuralgia of right side  M54.81 723.8 predniSONE (DELTASONE) 10 mg tablet     Chronic migraine without aura which is being triggered significantly by ongoing right shoulder and neck pain which has been present since her work-related injury. Luckily, the Hassel Gist does seem to dampen this and make the migraines more manageable. Justin Chalk is an effective acute abortive therapy. The nightmares associated with her chronic posttraumatic stress disorder is manageable with the Minipress. Pain from this injury is managed by pain management. Today, she did have additional complaints of supraorbital neuralgia and occipital neuralgia of the right side. Palpation of the supraorbital notch as well as the occipital groove successfully reproduced the symptom. To help calm this down, she was placed on a prednisone taper. If this is not successful, then we will set her up for a supraorbital and occipital nerve block. She was in agreement that plan. Follow-up in 3 months or sooner if needed. Gerda Perkins

## 2021-10-26 DIAGNOSIS — F41.9 ANXIETY: ICD-10-CM

## 2021-10-26 DIAGNOSIS — G43.709 CHRONIC MIGRAINE WITHOUT AURA WITHOUT STATUS MIGRAINOSUS, NOT INTRACTABLE: ICD-10-CM

## 2021-10-26 RX ORDER — VENLAFAXINE HYDROCHLORIDE 150 MG/1
150 CAPSULE, EXTENDED RELEASE ORAL DAILY
Qty: 90 CAPSULE | Refills: 3 | Status: SHIPPED | OUTPATIENT
Start: 2021-10-26

## 2021-11-04 ENCOUNTER — TELEPHONE (OUTPATIENT)
Dept: NEUROLOGY | Age: 51
End: 2021-11-04

## 2021-11-04 NOTE — TELEPHONE ENCOUNTER
----- Message from Katherine Donahue sent at 11/4/2021  2:04 PM EDT -----  Regarding: NP Brisa Rashid Message/Vendor Calls    Caller's first and last name:[place] and [facility]      Reason for call:appearance in court       Callback required yes/no and why:yes      Best contact number(s):[phone]      Details to clarify the request:law firm is stating the NP is supposed to appear in court on behalf of the patient and they need a response back      Katherinebryce Donahue

## 2021-11-15 ENCOUNTER — OFFICE VISIT (OUTPATIENT)
Dept: NEUROLOGY | Age: 51
End: 2021-11-15
Payer: COMMERCIAL

## 2021-11-15 DIAGNOSIS — G50.0 SUPRAORBITAL NEURALGIA: ICD-10-CM

## 2021-11-15 DIAGNOSIS — M54.81 OCCIPITAL NEURALGIA OF RIGHT SIDE: Primary | ICD-10-CM

## 2021-11-15 PROCEDURE — 64400 NJX AA&/STRD TRIGEMINAL NRV: CPT | Performed by: PSYCHIATRY & NEUROLOGY

## 2021-11-15 PROCEDURE — 64405 NJX AA&/STRD GR OCPL NRV: CPT | Performed by: PSYCHIATRY & NEUROLOGY

## 2021-11-15 NOTE — PROCEDURES
NEUROLOGY PROCEDURE NOTE    TIME OUT performed immediately prior to start of procedure:  Lindsay Friedman MD, have performed the following reviews on Christ Wahl prior to the start of the procedure:      * Patient was identified by name and date of birth   * Agreement on procedure being performed was verified  * Risks and Benefits explained to the patient  * Procedure site verified and marked as necessary  * Patient was positioned for comfort  * Consent was signed and verified      Time: 1120      Date of procedure: 11/15/2021     Procedure performed by: Alana Chaves MD     Provider assisted by: None     Patient assisted by: None     How tolerated by patient: tolerated the procedure well with no complications     Post Procedural Pain Scale: 0/10     Comments: none    Procedure documentation  Right greater occipital nerve block  Right supraorbital nerve block  Medications: 8 cc of 2% Lidocaine (preservative-free) NDC 4784-5150-72. Informed consent was obtained. The patient was brought to the examination room and placed in a seated position. The occiput was palpated at the point of the superior nuchal line, to identify the tenderness associated with passing of the greater occipital nerve. The overlying skin was then sterilely prepped with alcohol. Next, using sterile technique, a 27-gauge, 1-1/4 inch long needle was inserted into the subcutaneous tissue in proximity to the nerve, but not directly on top of the nerve. There was no blood on aspiration. The above medication solution was then gradually injected. No paresthesias were reported by the patient. The injection site was then cleansed with alcohol. The right suprorbital ridge was palpated. The overlying skin was then sterilely prepped with alcohol. Next, using a sterile technique, a 30 gauge 1/2 inch long needle was inserted into the subcutaneous tissue in the proximity of the nerve. There was no blood on aspiration.  The above medication solution was then gradually injected. No paresthesias were reported by the patient. The injection site was then cleansed with alcohol. The patient tolerated the procedure well. There were no complications. The patient was observed for an adequate period of time postoperatively. The patient's pain was well controlled and there were no new neurological deficits. The patient was asked to contact us if any questions arose.

## 2021-11-18 DIAGNOSIS — G43.709 CHRONIC MIGRAINE WITHOUT AURA WITHOUT STATUS MIGRAINOSUS, NOT INTRACTABLE: ICD-10-CM

## 2021-11-23 RX ORDER — ERENUMAB-AOOE 140 MG/ML
INJECTION, SOLUTION SUBCUTANEOUS
Qty: 1 ML | Refills: 5 | Status: SHIPPED | OUTPATIENT
Start: 2021-11-23 | End: 2022-06-29

## 2021-12-09 DIAGNOSIS — G43.709 CHRONIC MIGRAINE WITHOUT AURA WITHOUT STATUS MIGRAINOSUS, NOT INTRACTABLE: ICD-10-CM

## 2022-02-10 ENCOUNTER — DOCUMENTATION ONLY (OUTPATIENT)
Dept: NEUROLOGY | Age: 52
End: 2022-02-10

## 2022-02-15 NOTE — TELEPHONE ENCOUNTER
Patient Education     Discharge Instructions for Atrial Fibrillation  You have been diagnosed with an abnormal heart rhythm called atrial fibrillation (AFib). This means your heart’s 2 upper chambers quiver rather than squeeze the blood out in a normal pattern. This leads to an irregular and sometimes rapid heartbeat. Some people will have symptoms such as a flip-flopping heartbeat, chest pain, lightheadedness, or shortness of breath. Other people may have no symptoms at all. AFib is serious because it affects the heart’s ability to fill with blood as it should. Blood clots may form. This increases the risk for stroke. Untreated, it can also lead to heart failure. AFib can be controlled. With treatment, most people lead normal lives.  Treatment options  Treatment for AFib depends on your age, symptoms, how long you have had it, and other factors. You will have a complete evaluation to find out if you have any abnormalities that caused your heart to go into AFib. This might be blocked heart arteries, heart valve problems, or a thyroid problem. Your doctor will assess your case and discuss choices with you.  Treatment choices may include:  · Treating an underlying disorder that puts you at risk for atrial fibrillation. For example, correcting an abnormal thyroid or electrolyte problem, or treating a blocked heart artery.  · Restoring a normal heart rhythm with an electrical shock (cardioversion) or with an antiarrhythmic medicine (chemical cardioversion)  · Using medicine to control your heart rate  · Preventing the risk for blood clot and stroke using blood-thinning medicines, such as aspirin or clopidogrel.  · Preventing the risk of blood clot and stroke with procedures such as left atrial appendage closure. In this procedure, a small pouch in the top of your atrium where blood clots often form is blocked off. It can prevent blood clots and reduce stroke risk without the need for lifelong blood thinner  Mailed patient encounter notes to  per subpoena received. Records sent 9/2017 to present. (anticoagulants).  · Doing catheter ablation or a surgical maze procedure. These procedures use different methods to create scar tissue in certain areas of heart. This interrupts the abnormal electrical signals that cause AFib. This may be an option when medicines don't work, or instead of long-term medicine.  · Other treatment choices may be recommended for you by your doctor.  Managing risk factors for stroke and preventing heart failure are important parts of any treatment plan for AFib.  Home care  · Take your medicines exactly as directed. Don’t skip doses.  · Work with your doctor to find the right medicines and doses for you.  · Learn to take your own pulse. Keep a record of your results. Ask your doctor which pulse rates mean that you need medical attention. Slowing your pulse is often the goal of treatment. Ask your doctor if it’s OK for you to use an automatic machine to check your pulse at home. Sometimes these machines don’t count the pulse correctly with AFib.  · Limit your intake of coffee, tea, cola, and other drinks with caffeine. Talk with your doctor about whether you should cut out caffeine.  · Don't take over-the-counter medicines that have caffeine in them. Also avoid medicines with pseudoephedrine.  · Let your doctor know what medicines you take, including prescription and over-the-counter medicines, as well as any supplements. They interfere with some medicines given for AFib.  · Ask your doctor about whether you can drink alcohol. Some people need to avoid alcohol to better treat AFib. If you are taking blood-thinner medicines, alcohol may interfere with them by increasing their effect.  · Never take stimulants such as amphetamines or cocaine. These drugs can speed up your heart rate and trigger AFib.  · Manage your weight. If you are above your ideal body weight, losing excess pounds (kilograms) can reduce your incidence of AFib.    Follow-up care  Follow up with your doctor, or as  advised.  When to call your healthcare provider  Call your healthcare provider right away if you have any of the following:  · Weakness  · Dizziness  · Fainting  · Fatigue  · Shortness of breath  · Chest pain with increased activity  · A change in the usual regularity of your heartbeat, or an unusually fast heartbeat  [employer] last reviewed this educational content on 5/1/2019 © 2000-2020 [employer]. [address] [place] [address]. All rights reserved. This information is not intended as a substitute for professional medical care. Always follow your healthcare professional's instructions.           Patient Education     Eating Heart-Healthy Food: Using the DASH Plan    Eating for your heart doesn’t have to be hard or boring. You just need to know how to make healthier choices. The DASH eating plan has been developed to help you do just that. DASH stands for Dietary Approaches to Stop Hypertension. It is a plan that has been proven to be healthier for your heart and to lower your risk for high blood pressure. It can also help lower your risk for cancer, heart disease, osteoporosis, and diabetes.  Choosing from each food group  Choose foods from each of the food groups below each day. Try to get the recommended number of servings for each food group. The serving numbers are based on a diet of 2,000 calories a day. Talk with your healthcare provider if you’re not sure about your calorie needs. Along with getting the correct servings, the DASH plan also advises less than 2,300 mg of salt (sodium) per day. Lowering sodium intake to 1,500 mg per day lowers blood pressure even more. (There's about 2,300 mg of sodium in 1 teaspoon of salt.)      Grains  Servings: 6 to 8 a day  A serving is:  · 1 slice bread  · 1 ounce dry cereal  · Half a cup cooked rice, pasta or cereal  Best choices: Whole grains and any grains high in fiber. Vegetables  Servings: 4 to 5 a day  A serving is:  · 1 cup raw leafy  vegetable  · Half a cup cut-up raw or cooked vegetable  · Half a cup vegetable juice  Best choices: Fresh or frozen vegetables prepared without added salt or fat.   Fruits  Servings: 4 to 5 a day  A serving is:  · 1 medium fruit  · One-quarter cup dried fruit  · Half a cup fresh, frozen, or canned fruit  · Half a cup of 100% fruit juices  Best choices: A variety of fresh fruits of different colors. Whole fruits are a better choice than fruit juices. Low-fat or fat-free dairy  Servings: 2 to 3 a day  A serving is:  · 1 cup milk  · 1 cup yogurt  · One and a half ounces cheese  Best choices: Skim or 1% milk, low-fat or fat-free yogurt or buttermilk, and low-fat cheeses.         Lean meats, poultry, fish  Servings: 6 or fewer a day  A serving is:  · 1 ounce cooked meats, poultry, or fish  · 1 egg  Best choices: Lean poultry and fish. Trim away visible fat. Broil, grill, roast, or boil instead of frying. Remove skin from poultry before eating. Limit how much red meat you eat.  Nuts, seeds, beans  Servings: 4 to 5 a week  A serving is:  · One-third cup nuts (one and a half ounces)  · 2 tablespoons nut butter or seeds  · Half a cup cooked dry beans or legumes  Best choices: Dry roasted nuts with no salt added, lentils, kidney beans, garbanzo beans, and whole padron beans.   Fats and oils  Servings: 2 to 3 a day  A serving is:  · 1 teaspoon vegetable oil  · 1 teaspoon soft margarine  · 1 tablespoon mayonnaise  · 2 tablespoons salad dressing  Best choices: Nut and vegetable oils (nontropical vegetable oils), such as olive and canola oil. Sweets  Servings: 5 a week or fewer  A serving is:  · 1 tablespoon sugar, maple syrup, or honey  · 1 tablespoon jam or jelly  · 1 half-ounce jelly beans (about 15)  · 1 cup lemonade  Best choices: Dried fruit can be a satisfying sweet. Choose low-fat sweets. And watch your serving sizes!      For more on the DASH eating plan, visit:  www.nhlbi.nih.gov/health/health-topics/topics/dash    StayWell last reviewed this educational content on 7/1/2019  © 7121-1322 [employer]. [address] [place] [address], [place] [address]. All rights reserved. This information is not intended as a substitute for professional medical care. Always follow your healthcare professional's instructions.           Patient Education     Heart Failure: Warning Signs of a Flare-Up  You have heart failure. Once you have heart failure, flare-ups can happen. Below are signs that can mean your heart failure is getting worse. If you notice any of these warning signs, call your healthcare provider.  Swelling    · Your feet, ankles, or lower legs get puffier.  · You notice skin changes on your lower legs.  · Your shoes feel too tight.  · Your clothes are tighter in the waist.  · You have trouble getting rings on or off your fingers.  Shortness of breath  · You have to breathe harder even with normal activity or at rest.  · You are short of breath walking up stairs or even short distances.  · You wake up at night short of breath or coughing.  · You need to use more pillows or sit up to sleep.  · You wake up tired or restless.    Other warning signs  · You feel weaker, dizzy, or more tired.  · You have chest pain or changes in your heartbeat.  · You have a cough that won’t go away.  · You can’t remember things or don’t feel like eating.    Tracking your weight  Gaining weight is often the first warning sign that heart failure is getting worse. Gaining even a few pounds (kilograms) can be a sign that your body is retaining excess water and salt. Weighing yourself each day in the morning after you pee and before you eat, is the best way to know if you're retaining water. Get a scale that is easy to read and make sure you wear the same clothes and use the same scale every time you weigh yourself. Your healthcare provider will show you how to track your weight. Call your doctor if you gain more than 2 pounds (0.9 kg) in 1 day, 5  pounds (2.27 kg) in 1 week, or whatever weight gain you were told to report by your doctor. This needs to be evaluated and treated before it affects your breathing. Your doctor will tell you what to do next.    StayWell last reviewed this educational content on 7/1/2019 © 2000-2020 [employer]. [address] [place] [address], [place] [address]. All rights reserved. This information is not intended as a substitute for professional medical care. Always follow your healthcare professional's instructions.           Patient Education     Controlling High Blood Pressure  High blood pressure (hypertension) is often called the silent killer. This is because many people who have it, don’t know it. It can be very dangerous. High blood pressure can raise your risk of heart attack, stroke, heart disease, and heart failure. Controlling your blood pressure can decrease your risk of these problems. It's important to know the appropriate blood pressure range and remember to check your blood pressure regularly. Doing so can save your life.  Blood pressure measurements are given as 2 numbers. Systolic blood pressure is the upper number. This is the pressure when the heart contracts. Diastolic blood pressure is the lower number. This is the pressure when the heart relaxes between beats.  Blood pressure is categorized as normal, elevated, or stage 1 or stage 2 high blood pressure:  · Normal blood pressure is systolic of less than 120 and diastolic of less than 80 (120/80)  · Elevated blood pressure is systolic of 120 to 129 and diastolic less than 80  · Stage 1 high blood pressure is systolic of 130 to 139 or diastolic between 80 to 89  · Stage 2 high blood pressure is when systolic is 140 or higher or the diastolic is 90 or higher  A heart-healthy lifestyle can help you control your blood pressure without medicines. Here are some things you can do to pursue a heart-healthy lifestyle:    Choose heart-healthy foods  · Select  low-salt, low-fat foods. Limit sodium intake to 2,400 mg per day or the amount suggested by your healthcare provider.  · Limit canned, dried, cured, packaged, and fast foods. These can contain a lot of salt.  · Eat 8 to 10 servings of fruits and vegetables every day.  · Choose lean meats, fish, or chicken.  · Eat whole-grain pasta, brown rice, and beans.  · Eat 2 to 3 servings of low-fat or fat-free dairy products.  · Ask your doctor about the DASH eating plan. This plan helps reduce blood pressure.  · When you go to a restaurant, ask that your meal be prepared with no added salt.    Stay at a healthy weight  · Ask your healthcare provider how many calories to eat a day. Then stick to that number.  · Ask your healthcare provider what weight range is healthiest for you. If you are overweight, a weight loss of only 3% to 5% of your body weight can help lower blood pressure. Generally, a good weight loss goal is to lose 10% of your body weight in a year.  · Limit snacks and sweets.  · Get regular exercise.    Get up and get active  · Find activities you enjoy that can be done alone or with friends or family. Such activities might include bicycling, dancing, walking, or jogging.  · Park farther away from building entrances to walk more.  · Use stairs instead of the elevator.  · When you can, walk or bike instead of driving.  · [place] leaves, garden, or do household repairs.  · Be active at a moderate to vigorous level of physical activity for at least 40 minutes for a minimum of 3 to 4 days a week.     Manage stress  · Make time to relax and enjoy life. Find time to laugh.  · Communicate your concerns with your loved ones and your healthcare provider.  · Visit with family and friends, and keep up with hobbies.    Limit alcohol and quit smoking  · Men should have no more than 2 drinks per day.  · Women should have no more than 1 drink per day.  · Talk with your healthcare provider about quitting smoking. Smoking  significantly increases your risk for heart disease and stroke. Ask your healthcare provider about community smoking cessation programs and other options.    Medicines  If lifestyle changes aren’t enough, your healthcare provider may prescribe high blood pressure medicine. Take all medicines as prescribed. If you have any questions about your medicines, ask your healthcare provider before stopping or changing them.  StayWell last reviewed this educational content on 6/1/2019 © 2000-2020 [employer]. [address] [place] [address], [place] [address]. All rights reserved. This information is not intended as a substitute for professional medical care. Always follow your healthcare professional's instructions.           Patient Education     COPD Flare-Up    You have had a flare-up of your COPD.  COPD (chronic obstructive pulmonary disease) is a common lung disease. It causes your airways to get irritated and narrower. This makes it harder for you to breathe. Emphysema and chronic bronchitis are both types of COPD. This is a long-term (chronic) condition. This means you always have it. Sometimes it gets worse. When this happens, it's called a flare-up.   Symptoms of COPD  People with COPD may have symptoms most of the time. In a flare-up, your symptoms get worse. These symptoms may mean you are having a flare-up:   · Shortness of breath, shallow or rapid breathing, or wheezing that gets worse  · Lung infection  · Cough that gets worse  · More mucus (or sputum), thicker mucus, or mucus of a different color  · Tiredness, less energy, or trouble doing your normal activities  · Fever  · Chest tightness  · Your symptoms don’t get better even when you use your normal medicines, inhalers, and nebulizer  · Trouble talking  · You feel confused  Causes of flare-ups  Unfortunately, a flare-up can happen even if you did everything right. And even if you followed your healthcare provider’s instructions. Some causes of  flare-ups are:   · Cold weather  · Smoking or secondhand smoke  · Use of e-cigarettes or vaping products  · Colds, the flu, or respiratory infections  · Air pollution  · Sudden change in the weather  · Dust, vapors, gases, irritating chemicals, or strong fumes  · Not taking your medicines as prescribed  · Indoor pollution such as burning wood, smoke from home cooking, or heating fuels  Home care  Here are some things you can do at home to treat a flare-up:  · Keep calm and try not to panic. This makes it harder to breathe, and keeps you from doing the right things.  · Don’t smoke or be around others who are smoking. If you smoke, quit. Smoking is the main cause of COPD. Quitting will help you be able to better manage your COPD. Don't use e-cigarettes or vaping products either. Ask your healthcare provider about ways to help you quit smoking.  · Try to drink more fluids than normal during a flare-up, unless your healthcare provider has told you not to because of heart and kidney problems. More fluids can help loosen the mucus.  · Eat a healthy, balanced diet. This is important to staying as healthy as possible. So is trying to stay at your ideal weight. Being overweight or underweight can affect your health. Make sure you have a lot of fruits and vegetables every day. And also eat balanced portions of whole grains, lean meats and fish, and low-fat dairy products.  · Use your inhalers and nebulizer, if you have one, as you have been told to. When using a metered dose inhaler or nebulizer, it's very important to use the proper techniques. If you have any questions about how to use your device, contact your healthcare provider or refer to the user manual.  · If you were given antibiotics, take them until they are used up or your provider tells you to stop. It’s important to finish the antibiotics, even though you feel better. This will make sure the infection has cleared.  · If you were given a steroid, finish it even  if you feel better.  · Learn the names of your medicines, as well as how and when to use them. Talk with your provider about other conditions you have and their treatment and how it may affect your COPD.  · Oxygen may be prescribed if tests show that your blood contains too little oxygen. Ask your provider about long-term oxygen therapy.  · Coping tips for shortness of breath include:  ? Exercise. Try to be as active as possible. This will improve energy levels and strengthen your muscles, so you can do more.  ? Breathing methods. Ask your healthcare provider or nurse show you how to do pursed-lip breathing.  ? Balance rest and activity. Each day, try to balance rest periods with activity. For example, you might start the day with getting dressed and eating breakfast. Then you can relax and read the paper. After that, take a brief walk. And then sit with your feet up for a while.  ? Pulmonary rehab (rehabilitation).  Community-based and home-based programs work as well as hospital-based programs as long as they are as often and as intense. Standard home-based pulmonary rehab programs help shortness of breath in people with COPD. Supervised, traditional pulmonary rehab remains the best option for people with COPD. These programs help with managing your disease, and also help with breathing methods, exercise, support, and counseling. To find one, ask your provider or call your local hospital. Also talk with your healthcare provider about which rehab or self-management program is best for you.  Preventing a flare-up  Flare-ups happen. But the best way to treat one is to prevent it before it starts. Here are some pointers:   · Don’t smoke or be around others who are smoking. Avoid using e-cigarettes due to their harmful side effects.  · Take your medicines as discussed with your healthcare provider.  · Talk with your provider about getting a flu shot every year. Also find out if you need a pneumonia shot.  · If there is  a weather advisory warning to stay indoors, try to stay inside when possible.  · Try to eat healthy, exercise, and get plenty of sleep.  · Try to stay away from things that normally set you off. These include dust, chemical fumes, hairsprays, or strong perfumes.  Follow-up care  Follow up with your healthcare provider, or as advised.   If a culture was done, you will be told if your treatment needs to be changed. You can call as directed for the results.   If X-rays were done, you will be told of any new findings that may affect your care.   During each appointment, talk with your healthcare provider about your ability to:   · [place] in your normal environment  · Correctly use inhaler (or your medicine delivery systems)  · [place] with other conditions you have and their treatments and how they may affect your COPD  Call 911  Call 911 if any of these occur:   · Wheezing or shortness or breath does not get better with treatment  · Chest pain or chest tightness  · Feeling lightheaded or dizzy  · You have trouble breathing  · You feel confused or it’s hard to wake you up  · You faint or lose consciousness  · You have a rapid heart rate  · You have new pain in your chest, arm, shoulder, neck, or upper back  When to seek medical advice  Call your healthcare provider right away if any of these occur:  · Fever of 100.4°F (38ºC) or higher, or as directed by your healthcare provider  · Coughing up lots of dark-colored or bloody mucus (sputum)  · You don't start to get better within 24 hours  · Swelling of your ankles gets worse  · Weakness  [employer] last reviewed this educational content on 4/1/2019 © 2000-2021 [employer]. All rights reserved. This information is not intended as a substitute for professional medical care. Always follow your healthcare professional's instructions.           Patient Education     Chronic Lung Disease: Avoiding Irritants and Allergens    Many people with chronic lung disease, such as  asthma or COPD, need to avoid irritants that can trigger symptoms making it more difficult to breathe. Irritants are certain substances in the air that irritate the airways. Some people are also sensitive to certain allergens. These are substances that cause inflammation in the lungs. Allergens can also cause runny nose, or itchy, watery eyes. You probably can’t avoid all these things, all the time. But you’ll most likely breathe better if you stay away from the substances that bother you.   Try to avoid…  Smoke. This includes cigarettes, cigars, pipes, and fireplaces.  · Don’t smoke. And don’t allow anyone else to smoke near you or in your home.  · Ask for smoke-free hotel rooms and rental cars.  · Make sure fireplaces and wood stoves are well ventilated, and sit well away from them.  Smog. This is made up of car exhaust and other air pollutants.  · Read or listen to local air quality reports. These let you know when air quality is poor.  · Stay indoors as much as you can on smoggy days.  Strong odors. These include scented room fresheners, mothballs, and insect sprays. Perfume and cooking can be other causes of strong odors.  · Avoid using bleach and ammonia for cleaning.  · Use scent-free deodorant, lotion, and other products.  Other irritants. These include dust, aerosol sprays, and fine powders.  · Wear a mask while doing tasks like dusting, sweeping, and yard work.  Cold weather. This can make breathing more difficult.  · Protect your lungs by wearing a scarf over your nose and mouth.   You may also need to avoid…  If you have allergies, you should try to avoid the allergens that cause them. Ask your healthcare provider if you need to avoid any of these:  Pollen. This is a fine powder made by trees, grasses, and weeds.  · Try to learn what types of pollen affect you the most. Pollen levels vary during the year.  · Avoid outdoor activities when pollen levels are high. Use air conditioning instead of opening the  windows in your home and car.  Animal dander. This is shed by animals with fur or feathers. The particles can float through the air and stick to carpet, clothing, and furniture.  · Wash your hands and clothes after handling pets.  Dust mites. These are tiny bugs too small to see. They live in mattresses, bedding, carpets, curtains, and indoor dust.  · Wash bedding in hot water (130°F/54.4°C) each week.  · Cover mattresses and pillows with special mite-proof cases.  Mold. This grows in damp places, such as bathrooms, basements, and closets.  · Run an exhaust fan while bathing. Or, leave a window open in the bathroom.  · Use a dehumidifier in damp areas.  Date Last Reviewed: 5/1/2016  © 0947-8931 [employer]. [address] [place] [address]. All rights reserved. This information is not intended as a substitute for professional medical care. Always follow your healthcare professional's instructions.           Patient Education     Pursed-Lip Breathing  Pursed-lip breathing can help you get more oxygen into your lungs when you are short of breath. When you start to feel short of breath, use pursed-lip breathing to control your breathing. Breathing in through the nose and out through pursed or closed lips makes breathing easier. You can practice breathing this way anytime, anywhere. For example, if you are watching TV, practice during the commercials. Try to practice several times a day. Over time, pursed-lip breathing will feel natural.  Home care  Practice these steps every day so that you'll know how to do pursed-lip breathing when you have shortness of breath.  1. Sit in a comfortable chair.  2. Relax the muscles in your neck and shoulders.  3. Breathe in slowly through your nose.  4. Hold your lips together as if you are trying to whistle or blow out a candle.  5. Breathe out slowly and gently through your pursed lips. Your exhale should be twice as long as your inhale.   6. Repeat the above  steps as needed.  Use pursed-lip breathing to prevent shortness of breath when you do things such as exercising, climbing stairs, or bending or lifting. Breathe out during the hard part of any activity, such as when you bend, lift, or reach. Always breathe out for longer than you breathe in. This lets your lungs to empty as much as possible. Never hold your breath when doing pursed-lip breathing.    Follow-up care  Make a follow-up appointment, or as advised by your healthcare provider..  When to call the healthcare provider  Call your healthcare provider right away if you have any of the following:  · Shortness of breath, wheezing, or coughing. Call 911for trouble breathing and as directed by your healthcare provider.  · Increased mucus, or yellow, green, or bloody mucus  · Fever of 100.4°F (38°C) or higher, or as directed by your healthcare provider  · Tightness in your chest that does not go away with rest or medicine  · An irregular heartbeat  · Swollen ankles  [employer] last reviewed this educational content on 9/1/2018 © 2000-2020 [employer]. [address] [place] [address]. All rights reserved. This information is not intended as a substitute for professional medical care. Always follow your healthcare professional's instructions.           Patient Education     Healthy Meals for Diabetes     A healthcare provider will help you develop a meal plan that fits your needs.   Ask your healthcare team to help you make a meal plan that fits your needs. Your meal plan tells you when to eat your meals and snacks, what kinds of foods to eat, and how much of each food to eat. You don’t have to give up all the foods you like. But you do need to follow some guidelines.   Choose healthy carbohydrates  Starches, sugars, and fiber are all types of carbohydrates. Fiber can help lower your cholesterol and triglycerides. Fiber is also healthy for your heart. You should have 20 to 35 grams of total fiber  each day. Fiber-rich foods include:   · Whole-grain breads and cereals  · Bulgur wheat  · Brown rice · Whole-wheat pasta  · Fruits and vegetables  · Dry beans, and peas   Keep track of the amount of carbohydrates you eat. This can help you keep the right balance of physical activity and medicine. The amount of carbohydrates needed will vary for each person. It depends on many things such as your health, the medicines you take, and how active you are. Your healthcare team will help you figure out the right amount of carbohydrates for you. You may start with around 45 to 60 grams of carbohydrates per meal, depending on your situation.    Here are some examples of foods containing about 15 grams of carbohydrates (1 serving of carbohydrates):   · 1/2 cup of canned or frozen fruit  · A small piece of fresh fruit (4 ounces)  · 1 slice of bread  · 1/2 cup of oatmeal  · 1/3 cup of rice  · 4 to 6 crackers  · 1/2 English muffin  · 1/2 cup of black beans  · 1/4 of a large baked potato (3 ounces)  · 2/3 cup of plain fat-free yogurt  · 1 cup of soup  · 1/2 cup of casserole  · 6 chicken nuggets  · 2-inch-square brownie or cake without frosting  · 2 small cookies  · 1/2 cup of ice cream or sherbet  Choose healthy protein foods  Eating protein that is low in fat can help you control your weight. It also helps keep your heart healthy. Low-fat protein foods include:   · Fish  · Plant proteins, such as dry beans and peas, nuts, and soy products like tofu and soymilk  · Lean meat with all visible fat removed  · Poultry with the skin removed  · Low-fat or nonfat milk, cheese, and yogurt  Limit unhealthy fats and sugar  Saturated and trans fats are unhealthy for your heart. They raise LDL (bad) cholesterol. Fat is also high in calories, so it can make you gain weight. To cut down on unhealthy fats and sugar, limit these foods:   · Butter or margarine  · Palm and palm kernel oils and coconut oil  · Cream  · Cheese  · Lange  · Lunch meats  · Ice cream  · Sweet bakery goods such as pies, muffins, and donuts  · Jams and jellies  · Candy bars  · Regular sodas   How much to eat  The amount of food you eat affects your blood sugar. It also affects your weight. Your healthcare team will tell you how much of each type of food you should eat.   · Use measuring cups and spoons and a food scale to measure serving sizes.  · Learn what a correct serving size looks like on your plate. This will help when you are away from home and can’t measure your servings. For example, a serving of meat is about the palm of your hand.  · Eat only the number of servings given on your meal plan for each food. Don’t take seconds.  · Learn to read food labels. Be sure to look at serving size, total carbohydrates, fiber, calories, sugar, and saturated and trans fats. Look for healthier alternatives to foods that have added sugar.  · Plan ahead for parties so you can still have a good time without going overboard with unhealthy food choices. Set a good example yourself by bringing a healthy dish to pot lucks.   Choose healthy snacks  When it comes to snacks, we usually think about foods with added sugar and fats. But there are many other options for healthier snack choices. Here are a few snack ideas to choose from:   Snacks with less than 5 grams of carbohydrates  · 1 piece of string cheese  · 3 celery sticks plus 1 tablespoon of peanut butter  · 5 cherry tomatoes plus 1 tablespoon of ranch dressing  · 1 hard-boiled egg  · 1/4 cup of fresh blueberries  ·  5 baby carrots  · 1 cup of light popcorn  · 1/2 cup of sugar-free gelatin  · 15 almonds  Snacks with about 10 to 20 grams of carbohydrates  · 1/3 cup of hummus plus 1 cup of fresh cut nonstarchy vegetables (carrots, green peppers, broccoli, celery, or a combination)  · 1/2 cup of fresh or canned fruit plus 1/4 cup of cottage cheese  · 1/2 cup of tuna salad with 4 crackers  · 2 rice cakes and a tablespoon of peanut butter  · 1 small  apple or orange  · 3 cups light popcorn  · 1/2 of a turkey sandwich (1 slice of whole-wheat bread, 2 ounces of turkey, and mustard)  Portion sizes are important to controlling your blood sugar and staying at a healthy weight. Stock up on healthy snack items so you always have them on hand.   When to eat  Your meal plan will likely include breakfast, lunch, dinner, and some snacks.  · Try to eat your meals and snacks at about the same times each day.  · Eat all your meals and snacks. Skipping a meal or snack can make your blood sugar drop too low. It can also cause you to eat too much at the next meal or snack. Then your blood sugar could get too high.  [email or web address] last reviewed this educational content on 11/1/2018 © 2000-2020 [employer]. [address] [place] [address], [place] [address]. All rights reserved. This information is not intended as a substitute for professional medical care. Always follow your healthcare professional's instructions.           Patient Education     Low Blood Sugar (Hypoglycemia)     Fast-acting sugar can be a glass of nonfat milk.        Having too little sugar (glucose) in your blood is called low blood sugar (hypoglycemia). Low blood sugar often means anything lower than 70 mg/dL. Talk with your healthcare provider about your target range. Ask what level is too low for you. Diabetes itself doesn’t cause low blood sugar. But some treatments for diabetes may raise your risk for it. These include pills or insulin. Low blood sugar may make you pass out or have a seizure. So always treat low blood sugar right away. But don't overeat.  Special note: Always carry a source of fast-acting sugar and a snack in case of hypoglycemia.  What you may notice  If you have low blood sugar, you may have one or more of these symptoms:  · Shakiness or dizziness  · Cold, clammy skin or sweating  · Feeling hungry  · Headache  · Nervousness  · A hard, fast heartbeat  · Weakness  · Confusion or  irritability  · Blurred eyesight  · Having nightmares or waking up confused or sweating  · Numbness or tingling in the lips or tongue  What you should do  Here are tips to follow if you have hypoglycemia:   · First check your blood sugar. If it's too low (out of your target range), eat or drink 15 to 20 grams of fast-acting sugar. This may be 3 to 4 glucose tablets, 4 ounces (half a cup) of fruit juice or regular (nondiet) soda, or 1 tablespoon of honey. Don’t take more than this, or your blood sugar may go too high.  · Don't eat foods high in protein such as milk or nuts to treat hypoglycemia. Protein may increase your insulin response. It may lower your blood sugar even more.  · Wait 15 minutes. Then recheck your blood sugar if you can.  · If your blood sugar is still too low, repeat the steps above and check your blood sugar again. If your blood sugar still has not gone back to your target range, contact your healthcare provider. Or seek emergency care.  · Once your blood sugar is back at target range, eat a snack or meal.  Preventing low blood sugar  Things you can do include the following:   · If your condition needs a strict treatment plan, eat meals and snacks at the same times each day. Don’t skip meals!  · If your treatment plan lets you change when and what you eat, learn how to change the time and dose of your rapid-acting insulin to match this.   · Ask your healthcare provider if it's safe to drink alcohol. Never drink on an empty stomach.  · Take your medicine at the prescribed times.  · Always carry a source of fast-acting sugar and a snack when you’re away from home.  · If you have had several hypoglycemic episodes, talk with your healthcare provider. See if you may be able to take less medicine. You also may have a condition where you no longer recognize the symptoms of low blood sugar until the value falls to dangerous levels.  Other things to do  Other tips include:  · Carry a medical ID card or  wear a medical alert bracelet or necklace. It should say that you have diabetes. It should also say what to do if you pass out or have a seizure.  · Make sure your family, friends, and coworkers know the signs of low blood sugar. Tell them what to do if your blood sugar falls very low and you can’t treat yourself.  · Keep a glucagon emergency kit handy. Be sure your family, friends, and coworkers know how and when to use it. Check it often. Replace the glucagon before it expires.  · Talk with your healthcare team about other things you can do to prevent low blood sugar.  If you have unexplained hypoglycemia or hypoglycemia several times, call your healthcare provider.  [employer] last reviewed this educational content on 11/1/2018 © 2000-2020 [employer]. [address] [place] [address], [place] [address]. All rights reserved. This information is not intended as a substitute for professional medical care. Always follow your healthcare professional's instructions.           Patient Education     High Blood Sugar (Hyperglycemia)  Too much sugar (glucose) in your blood is called high blood sugar (hyperglycemia). This can lead to a dangerous condition called ketoacidosis. In severe cases, it can lead to fluid loss (dehydration) and coma.   Possible causes of high blood sugar   · Having a poor treatment plan for diabetes   · Being sick  · Being under stress  · Taking certain medicines, such as steroids  · Eating too much food, especially carbohydrates  · Being less active than normal  · Not taking enough diabetes medicine    Symptoms of high blood sugar   High blood sugar may not cause symptoms. If you do have symptoms, they may include:   · Thirst  · Frequent need to urinate  · Feeling tired or drowsy  · Nausea and vomiting  · Itchy, dry skin  · Blurry vision  · Fast breathing and breath that smells fruity   · Weakness  · Dizziness  · Wounds or skin infections that don’t heal  · Unexplained weight loss if  hyperglycemia lasts for more than a few days   What to do   Do the following:   · Check your blood sugar.  · Drink plenty of sugar-free, caffeine-free liquids such as water. Don’t drink fruit juice.  · Check your blood sugar again every 4 hours. If you take insulin or diabetes medicines, follow your sick-day plan for taking medicine. Call your healthcare provider if you are not able to eat.  · Check your blood or urine for ketones as directed.  · Call your provider if your blood sugar and ketones don't go back to your target range.  · If the value is high, take your diabetes medicines as prescribed. And check your blood sugar more often. Doses of medicines such as insulin can be increased slightly if blood sugars stay high. But your provider must approve this.     When you have hyperglycemia, drink plenty of water or other sugar-free, caffeine-free liquids.   Preventing high blood sugar  To help keep your blood sugar from getting too high:  · Control stress.  · When you're ill, follow your sick-day plan.   · Follow your meal plan. Eat only the amount of food on your meal plan.  · Stick to your exercise plan.  · Take your insulin or diabetes medicines as directed by your healthcare team. Also test your blood sugar as directed. If the plan is not working for you, discuss it with your healthcare provider.  Other things to do  · Carry a medical ID card or a compact USB drive. Or wear a medical alert bracelet or necklace. It should say that you have diabetes. It should also say what to do in case you pass out or go into a coma.  · Make sure family, friends, and coworkers know the signs of high blood sugar. Tell them what to do if your blood sugar gets very high and you can’t help yourself.  · Talk with your healthcare team about other things you can do to prevent high blood sugar.       Special note: Drink plenty of sugar-free and caffeine-free liquids when you feel symptoms of hyperglycemia. Call your healthcare  provider if you keep having episodes of high blood sugar.   [employer] last reviewed this educational content on 11/1/2018  © 3066-2279 [employer]. [address] [place] [address], [place] [address]. All rights reserved. This information is not intended as a substitute for professional medical care. Always follow your healthcare professional's instructions.

## 2022-03-20 PROBLEM — M48.02 CERVICAL STENOSIS OF SPINAL CANAL: Status: ACTIVE | Noted: 2018-10-09

## 2022-03-24 ENCOUNTER — TELEPHONE (OUTPATIENT)
Dept: NEUROLOGY | Age: 52
End: 2022-03-24

## 2022-03-25 ENCOUNTER — PATIENT MESSAGE (OUTPATIENT)
Dept: NEUROLOGY | Age: 52
End: 2022-03-25

## 2022-03-28 ENCOUNTER — PATIENT MESSAGE (OUTPATIENT)
Dept: NEUROLOGY | Age: 52
End: 2022-03-28

## 2022-04-01 ENCOUNTER — OFFICE VISIT (OUTPATIENT)
Dept: NEUROLOGY | Age: 52
End: 2022-04-01

## 2022-04-01 DIAGNOSIS — G50.0 SUPRAORBITAL NEURALGIA: ICD-10-CM

## 2022-04-01 DIAGNOSIS — M54.81 OCCIPITAL NEURALGIA OF RIGHT SIDE: Primary | ICD-10-CM

## 2022-04-01 NOTE — PROCEDURES
[facility]OLOG[facility]INIC [place]    OFFICE PROCEDURE NOTE    PROCEDURE: Right Greater Occipital Nerve Block  Date of Procedure: 4/1/2022  CPT Code: 56006    ICD-10 Code:     ICD-10-CM ICD-9-CM    1. Occipital neuralgia of right side  M54.81 723.8        Time Out performed immediately prior to the start of procedure:  Sanjeev RILEY MD, have performed the following review on [place]  prior to the start of the procedure: Right Greater Occipital Nerve Block. The patient was identified by name and date of birth. Agreement on the procedure to be performed was verified. The potential Benefits and potential Risks were explained to the patient. The procedure site(s) were verified and marked as necessary. Consent was signed and verified. The patient was positioned for comfort. Time: 0935. Date of Procedure: 4/1/2022. Procedure performed by: Sanjeev Penn MD.  Provider assisted by: none. Patient assisted by: self. How patient tolerated procedure: mild procedure-related pain, no complications. Comments: none. Medications:   1 mL Dexamethasone 4 mg/ mL  2 mL Bupivacaine 0.5%    TECHNICAL: The above medications were drawn up in sterile fashion in a 3 mL syringes (with 27-gauge needle attached). The patient was placed in the seated position with neck slightly flexed. A site approximately 1/3 the distance from the occipital protuberance to the right mastoid process was palpated, corresponding to the approximate location of the greater occipital nerve. The skin was cleaned with alcohol wipes, and the needle was inserted perpendicularly until contacting occiput. The needle was slightly withdrawn. After negative aspiration (no blood), 3.0 mL solution was injected in a fan like distrubution and the needle was withdrawn. The patient appeared to have tolerated the procedure well without any complications, and was advised to follow up with their referring provider.        Signed By: Libby Davies Aris Loomis MD     April 1, 2022

## 2022-04-01 NOTE — PROCEDURES
[facility]UROLOGY CLINIC [place]    OFFICE PROCEDURE NOTE    PROCEDURE: Right Supraorbital Nerve Block  Date of Procedure: 4/1/2022  CPT Code: 05274    ICD-10 Code:     ICD-10-CM ICD-9-CM    1. Occipital neuralgia of right side  M54.81 723.8    2. 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 The patient was identified by name and date of birth. Agreement on the procedure to be performed was verified. The potential Benefits and potential Risks were explained to the patient. The procedure site(s) were verified and marked as necessary. Consent was signed and verified. The patient was positioned for comfort. Time: 0933  Date of Procedure: 4/1/2022. Procedure performed by: Sourav Guevara MD.  Provider assisted by: none. Patient assisted by: self. How patient tolerated procedure: mild procedure-related pain, no complications. Comments: none. Medications:   0.5 mL dexamethasone 4 mg/ mL  0.5 mL Bupivacaine 0.5%    TECHNICAL:  The above medications were drawn up in sterile fashion in a 1 mL syringes (with 30-gauge needle attached). The patient was placed in the supine position with head of bed elevated. The right supraorbital notch was palpated along the superior orbital rim. The area was cleaned with alcohol swabs. While holding gauze over the right eyelid, the needle was inserted perpendicularly through the eyebrow along a vertical line from the supraorbital notch. After contacting bony ridge, the needle was slightly withdrawn, directed superiorly, and after negative aspiration, the solution was injected in a fan-like distribution. The needle was withdrawn and there were no obvious complications. The patient had full range of eye movements without any visual deficit.   They were advised to follow up with their referring provider. The patient ambulated out of the room without assistance.         Signed By: Mordecai Schaumann, MD     April 1, 2022

## 2022-05-17 ENCOUNTER — TELEPHONE (OUTPATIENT)
Dept: NEUROLOGY | Age: 52
End: 2022-05-17

## 2022-07-07 DIAGNOSIS — G43.709 CHRONIC MIGRAINE WITHOUT AURA WITHOUT STATUS MIGRAINOSUS, NOT INTRACTABLE: ICD-10-CM

## 2022-07-11 RX ORDER — UBROGEPANT 50 MG/1
TABLET ORAL
Qty: 9 TABLET | Refills: 3 | Status: SHIPPED | OUTPATIENT
Start: 2022-07-11

## 2023-01-03 ENCOUNTER — VIRTUAL VISIT (OUTPATIENT)
Dept: NEUROLOGY | Age: 53
End: 2023-01-03
Payer: COMMERCIAL

## 2023-01-03 DIAGNOSIS — G43.709 CHRONIC MIGRAINE WITHOUT AURA WITHOUT STATUS MIGRAINOSUS, NOT INTRACTABLE: Primary | ICD-10-CM

## 2023-01-03 DIAGNOSIS — T39.95XA ANALGESIC REBOUND HEADACHE: ICD-10-CM

## 2023-01-03 DIAGNOSIS — G44.40 ANALGESIC REBOUND HEADACHE: ICD-10-CM

## 2023-01-03 DIAGNOSIS — F43.12 NIGHTMARES ASSOCIATED WITH CHRONIC POST-TRAUMATIC STRESS DISORDER: ICD-10-CM

## 2023-01-03 DIAGNOSIS — F41.9 ANXIETY: ICD-10-CM

## 2023-01-03 DIAGNOSIS — M54.81 OCCIPITAL NEURALGIA OF RIGHT SIDE: ICD-10-CM

## 2023-01-03 DIAGNOSIS — F51.5 NIGHTMARES ASSOCIATED WITH CHRONIC POST-TRAUMATIC STRESS DISORDER: ICD-10-CM

## 2023-01-03 PROCEDURE — 99214 OFFICE O/P EST MOD 30 MIN: CPT | Performed by: NURSE PRACTITIONER

## 2023-01-03 RX ORDER — FLUOXETINE HYDROCHLORIDE 20 MG/1
20 CAPSULE ORAL DAILY
COMMUNITY
Start: 2022-09-30

## 2023-01-03 RX ORDER — ARIPIPRAZOLE 2 MG/1
2 TABLET ORAL DAILY
COMMUNITY
Start: 2022-09-30

## 2023-01-03 RX ORDER — MIRABEGRON 50 MG/1
TABLET, FILM COATED, EXTENDED RELEASE ORAL
COMMUNITY
Start: 2022-12-22

## 2023-01-03 RX ORDER — VENLAFAXINE HYDROCHLORIDE 150 MG/1
150 CAPSULE, EXTENDED RELEASE ORAL DAILY
Qty: 90 CAPSULE | Refills: 3 | Status: SHIPPED | OUTPATIENT
Start: 2023-01-03

## 2023-01-03 RX ORDER — ERENUMAB-AOOE 140 MG/ML
INJECTION, SOLUTION SUBCUTANEOUS
Qty: 1 ML | Refills: 0 | Status: SHIPPED | OUTPATIENT
Start: 2023-01-03

## 2023-01-03 NOTE — PROGRESS NOTES
[address] [facility],5Th [address]  [address]   [place] [address] [facility]    [phone] Office   [phone] Fax           Date:  23     Name:  Betty Varela  :  1970  MRN:  [id number]     PCP:  Alexis Niño MD    Jennifer Quinn is a 47 y.o. female who was seen by synchronous (real-time) audio-video technology on 1/3/2023 for Migraine    Subjective: At this point, she has been getting migraines daily. She is taking something several times a week. It has been at least since the end of summer since she has had the Aimovig. When she was taking this she was still having frequent migraines but she was only having about 10 per month and they were more manageable. About twice a week, she is having the sharp pain in the right occipital nerve. She has had OCN block for this which does help but this occurs less when she the migraines are more manageable. She is still seeing a psychiatrist and therapist. She is getting injections through orthopedics for her right shoulder. Recap from LOV:  Chronic migraine without aura which is being triggered significantly by ongoing right shoulder and neck pain which has been present since her work-related injury. Luckily, the Monika Stephane does seem to dampen this and make the migraines more manageable. Madie Sable is an effective acute abortive therapy. The nightmares associated with her chronic posttraumatic stress disorder is manageable with the Minipress. Pain from this injury is managed by pain management. Today, she did have additional complaints of supraorbital neuralgia and occipital neuralgia of the right side. Palpation of the supraorbital notch as well as the occipital groove successfully reproduced the symptom. To help calm this down, she was placed on a prednisone taper.   If this is not successful, then we will set her up for a supraorbital and occipital nerve block.  She was in agreement that plan. Follow-up in 3 months or sooner if needed. Current Outpatient Medications   Medication Sig    FLUoxetine (PROzac) 20 mg capsule Take 20 mg by mouth daily. ARIPiprazole (ABILIFY) 2 mg tablet Take 2 mg by mouth daily. Myrbetriq 50 mg ER tablet     Ubrelvy 50 mg tablet TAKE ONE TABLET BY MOUTH AS NEEDED FOR MIGRAINE. MAY REPEAT DOSE ONCE IF NEEDED. MAXIMUM OF 2 DOSES IN 24 HOURS    venlafaxine-SR (EFFEXOR-XR) 150 mg capsule Take 1 Capsule by mouth daily. OTHER Apply  to affected area daily. Indications: Salve GLeaf / shoulders    OTHER,NON-FORMULARY, Take 80.5 mg by mouth daily. Indications: Taking medical Mariguana gummie at this time only taking 1 per day    OTHER CBD Oil and cream    prazosin (MINIPRESS) 1 mg capsule TAKE 1 CAPSULE BY MOUTH EVERY NIGHT (Patient taking differently: 2 mg.)    LORazepam (ATIVAN) 1 mg tablet Take 1 mg by mouth daily. esomeprazole (NEXIUM) 20 mg capsule Take 20 mg by mouth. EPINEPHrine (EPIPEN) 0.3 mg/0.3 mL injection     medroxyPROGESTERone (DEPO-PROVERA) 150 mg/mL syrg 150 mg by IntraMUSCular route every three (3) months. No current facility-administered medications for this visit.      Allergies   Allergen Reactions    Benadryl [Diphenhydramine Hcl] Other (comments)     Able to tolerate IV benadryl but oral benadryl causes her to feel like \"things are coming out of my hands\"    Iodine Swelling     Face/hands    Shellfish Containing Products Swelling     Face    Sting, Bee Swelling      Past Medical History:   Diagnosis Date    Anxiety     With \"nervous tick\"    Calculus of kidney 2016    x 1     Claustrophobia     Fatigue     From an injury    GERD (gastroesophageal reflux disease)     Severe    Migraine     Nightmare     PTSD (post-traumatic stress disorder)     SVT (supraventricular tachycardia) ([facility] [address] 2015    None since ablation    Vertigo     Vitiligo      Past Surgical History:   Procedure Laterality Date    HX OTHER SURGICAL Right 01/31/2017    wrist surgery, cleaned out all of the scar tissue from an old accident     HX OTHER SURGICAL  10/2018    C4-C5 replaced with a titanium and cadivir bones     HX OTHER SURGICAL  03/27/2019    right shoulder- took out part of her collar bone, removed scar tissue     HX SVT ABLATION N/A 2015    NM EXCIS BARTHOLIN GLAND/CYST  2007    NM PELVIS/HIP JOINT SURGERY UNLISTED Left 1996    Piriformis Syndrome      reports that she has never smoked. She has never used smokeless tobacco. She reports that she does not drink alcohol and does not use drugs. family history includes Anesth Problems in her mother; Cancer in her mother; Diabetes in her brother, father, and mother; Heart Disease in her mother; Pacemaker in her brother; Parkinsonism in her father. ROS    Objective:   No flowsheet data found. General:  Well defined, nourished, and groomed individual in no acute distress. Psych:  Good mood and bright affect    NEUROLOGICAL EXAMINATION:     Mental Status:   Alert and oriented to person, place, and time with recent and remote memory intact. Attention span and concentration are normal. Speech is fluent with a full fund of knowledge. Cranial Nerves:  I: smell Not tested   II: visual fields Not assessed   II: pupils Equal, round, reactive to light   II: optic disc Not assessed   III,VII: ptosis none   III,IV,VI: extraocular muscles  Full ROM   V: mastication normal   V: facial light touch sensation  Not assessed   VII: facial muscle function   symmetric   VIII: hearing symmetric   IX: soft palate elevation  normal   XI: trapezius strength  Not assessed   XI: sternocleidomastoid strength Not assessed   XI: neck flexion strength  Not assessed   XII: tongue  midline     Motor Examination: Normal tone and bulk. Strength was not assessed      Sensory exam:  Not assessed     Coordination:  Heel-to-shin was smooth and symmetrical bilaterally.   Finger to nose and rapid arm movement testing was normal.   No resting or intention tremor    Gait and Station:  Steady while walking on toes, heels, and with tandem walking. Normal arm swing. No pronator drift. No muscle wasting or fasiculations noted. Reflexes:  Not assessed    Assessment & Plan:       ICD-10-CM ICD-9-CM    1. Chronic migraine without aura without status migrainosus, not intractable  G43.709 346.70 ubrogepant (Ubrelvy) 100 mg tablet      erenumab-aooe (Aimovig Autoinjector) 140 mg/mL injection      venlafaxine-SR (EFFEXOR-XR) 150 mg capsule      2. Anxiety  F41.9 300.00 venlafaxine-SR (EFFEXOR-XR) 150 mg capsule      3. Occipital neuralgia of right side  M54.81 723.8       4. Nightmares associated with chronic post-traumatic stress disorder  F51.5 307.47     F43.12 309.81       5. Analgesic rebound headache  G44.40 339.3     T39.95XA E935.9         Chronic migraine without aura which is being triggered significantly by ongoing right shoulder and neck pain which has been present since her work-related injury. With the Sarath Altair, these are much more manageable but she has been out of this for a few months due to a lack of follow up. The Esvin Bitter does work for acute management but she has to redose. At this point, she will restart the Aimovig. The Esvin Bitter was increased to 100mg for better effect. She was reminded of analgesic rebound and should not take acute medication for her migraines more than twice a week. If these measures help but the OCN continues to be problematic, she will be set up for a block. The nightmares associated with her chronic posttraumatic stress disorder is manageable with the Minipress. She is seeing psychiatry for the anxiety and depression. She continues to have chronic pain which is managed by pain and orthopedics. Follow up in three months. We discussed the expected course, resolution and complications of the diagnosis(es) in detail.   Medication risks, benefits, costs, interactions, and alternatives were discussed as indicated. I advised her to contact the office if her condition worsens, changes or fails to improve as anticipated. She expressed understanding with the diagnosis(es) and plan. Javi Martinez, was evaluated through a synchronous (real-time) audio-video encounter. The patient (or guardian if applicable) is aware that this is a billable service, which includes applicable co-pays. This Virtual Visit was conducted with patient's (and/or legal guardian's) consent. The visit was conducted pursuant to the emergency declaration under the [address] [place] [address], [address] [place] [address] authority and the [employer] and CONEXANCE MD General Act. Patient identification was verified, and a caregiver was present when appropriate. The patient was located at: Home: [address] [facility]  The provider was located at:  Other: Carine Aguilar NP

## 2023-04-03 ENCOUNTER — TELEPHONE (OUTPATIENT)
Dept: NEUROLOGY | Age: 53
End: 2023-04-03

## 2023-07-17 ENCOUNTER — HOSPITAL ENCOUNTER (EMERGENCY)
Facility: HOSPITAL | Age: 53
Discharge: HOME | End: 2023-07-17
Attending: EMERGENCY MEDICINE
Payer: COMMERCIAL

## 2023-07-17 ENCOUNTER — TELEPHONE (OUTPATIENT)
Age: 53
End: 2023-07-17

## 2023-07-17 VITALS
DIASTOLIC BLOOD PRESSURE: 79 MMHG | TEMPERATURE: 98.1 F | HEART RATE: 84 BPM | WEIGHT: 118 LBS | OXYGEN SATURATION: 99 % | RESPIRATION RATE: 19 BRPM | HEIGHT: 62 IN | SYSTOLIC BLOOD PRESSURE: 129 MMHG | BODY MASS INDEX: 21.71 KG/M2

## 2023-07-17 DIAGNOSIS — G43.009 MIGRAINE WITHOUT AURA AND WITHOUT STATUS MIGRAINOSUS, NOT INTRACTABLE: Primary | ICD-10-CM

## 2023-07-17 PROCEDURE — 99284 EMERGENCY DEPT VISIT MOD MDM: CPT

## 2023-07-17 PROCEDURE — 96374 THER/PROPH/DIAG INJ IV PUSH: CPT

## 2023-07-17 PROCEDURE — 2580000003 HC RX 258

## 2023-07-17 PROCEDURE — 6360000002 HC RX W HCPCS

## 2023-07-17 PROCEDURE — 96375 TX/PRO/DX INJ NEW DRUG ADDON: CPT

## 2023-07-17 RX ORDER — BUTALBITAL, ACETAMINOPHEN AND CAFFEINE 300; 40; 50 MG/1; MG/1; MG/1
1 CAPSULE ORAL EVERY 4 HOURS PRN
Qty: 42 CAPSULE | Refills: 0 | Status: SHIPPED | OUTPATIENT
Start: 2023-07-17 | End: 2023-07-24

## 2023-07-17 RX ORDER — KETOROLAC TROMETHAMINE 15 MG/ML
15 INJECTION, SOLUTION INTRAMUSCULAR; INTRAVENOUS EVERY 6 HOURS PRN
Status: DISCONTINUED | OUTPATIENT
Start: 2023-07-17 | End: 2023-07-18 | Stop reason: HOSPADM

## 2023-07-17 RX ORDER — 0.9 % SODIUM CHLORIDE 0.9 %
1000 INTRAVENOUS SOLUTION INTRAVENOUS ONCE
Status: COMPLETED | OUTPATIENT
Start: 2023-07-17 | End: 2023-07-17

## 2023-07-17 RX ORDER — PROCHLORPERAZINE EDISYLATE 5 MG/ML
10 INJECTION INTRAMUSCULAR; INTRAVENOUS EVERY 6 HOURS PRN
Status: DISCONTINUED | OUTPATIENT
Start: 2023-07-17 | End: 2023-07-18 | Stop reason: HOSPADM

## 2023-07-17 RX ADMIN — PROCHLORPERAZINE EDISYLATE 10 MG: 5 INJECTION, SOLUTION INTRAMUSCULAR; INTRAVENOUS at 17:23

## 2023-07-17 RX ADMIN — SODIUM CHLORIDE 1000 ML: 9 INJECTION, SOLUTION INTRAVENOUS at 17:18

## 2023-07-17 RX ADMIN — KETOROLAC TROMETHAMINE 15 MG: 15 INJECTION, SOLUTION INTRAMUSCULAR; INTRAVENOUS at 17:26

## 2023-07-17 ASSESSMENT — PAIN - FUNCTIONAL ASSESSMENT
PAIN_FUNCTIONAL_ASSESSMENT: NONE - DENIES PAIN
PAIN_FUNCTIONAL_ASSESSMENT: 0-10

## 2023-07-17 ASSESSMENT — PAIN SCALES - GENERAL
PAINLEVEL_OUTOF10: 10
PAINLEVEL_OUTOF10: 10

## 2023-07-17 ASSESSMENT — LIFESTYLE VARIABLES
HOW MANY STANDARD DRINKS CONTAINING ALCOHOL DO YOU HAVE ON A TYPICAL DAY: PATIENT DOES NOT DRINK
HOW OFTEN DO YOU HAVE A DRINK CONTAINING ALCOHOL: NEVER

## 2023-07-17 NOTE — ED PROVIDER NOTES
[facility] EMERGENCY DEPT  EMERGENCY DEPARTMENT HISTORY AND PHYSICAL EXAM      Date: 7/17/2023  Patient Name: Parker Riley  MRN: [id number]  YOB: 1970  Date of evaluation: 7/17/2023  Provider: Cadence Vazquez PA-C   Note Started: 4:21 PM EDT 7/17/23    HISTORY OF PRESENT ILLNESS     Chief Complaint   Patient presents with    Headache       History Provided By: Patient    HPI: Parker Riley is a 47 y.o. female past medical history including migraines presents emerged part today with complaint of headache starting 3 days ago. States headache seems front part of her head described as throbbing. Pain is 10 out of 10. Reports associated symptoms including photophobia and some nausea. Has been taking Excedrin Migraine for symptoms with very little relief. Denies any vision changes, neck pain, chest pain, shortness of breath, neurodeficits, abdominal pain, vomiting, diarrhea at this time.     PAST MEDICAL HISTORY   Past Medical History:  Past Medical History:   Diagnosis Date    Anxiety     With \"nervous tick\"    Calculus of kidney 2016    x 1     Claustrophobia     Fatigue     From an injury    GERD (gastroesophageal reflux disease)     Severe    Migraine     Nightmare     PTSD (post-traumatic stress disorder)     SVT (supraventricular tachycardia) ([address]) 2015    None since ablation    Vertigo     Vitiligo        Past Surgical History:  Past Surgical History:   Procedure Laterality Date    ABLATION OF DYSRHYTHMIC FOCUS N/A 2015    EXCIS BARTHOLIN GLAND/CYST  2007    OTHER SURGICAL HISTORY  10/2018    C4-C5 replaced with a titanium and cadivir bones     OTHER SURGICAL HISTORY  03/27/2019    right shoulder- took out part of her collar bone, removed scar tissue     OTHER SURGICAL HISTORY Right 01/31/2017    wrist surgery, cleaned out all of the scar tissue from an old accident     PELVIS/HIP JOINT SURGERY UNLISTED Left 1996    Piriformis Syndrome       Family History:  Family History   Problem Relation

## 2023-07-17 NOTE — TELEPHONE ENCOUNTER
Patient calling regarding a migraine she had since Friday 07/14/23. She stated her insurance stop covering medication UBREVOY and would like to know if there is any other alternative medication. She also stated medication Dannielle Alberta does not help her migraines.     Please contact

## 2023-07-17 NOTE — ED TRIAGE NOTES
Pt complains of headache since Friday. Pt has HX of migraines previously took [place] but her insurance no longer covers it. Pt says she has tried excedrin with no relief.

## 2023-07-17 NOTE — ED NOTES
Airway: Patent, Managing oral secretions, and No obstruction    Respiratory: Normal Rate , Normal Depth, No acute Distress, and Speaking in full sentences    Cardiac: WNL    Neuro: AVPU Alert, reports severe headache, hx of migraines, medication which was effective has been stopped by insurance. Pt reports nausea, no vomiting.      GI: Soft and Non-tender    : WNL    Muscle/Skeletal: WNL         Bandar Cooper RN  07/17/23 [id number]

## 2023-07-19 ENCOUNTER — TELEPHONE (OUTPATIENT)
Age: 53
End: 2023-07-19

## 2023-07-19 NOTE — TELEPHONE ENCOUNTER
Sent her response via [facility] as well as asked she let me know about the prednisone taper. Sent message to PA specialist for a PA on medication Ubrelvy.

## 2023-07-21 RX ORDER — PREDNISONE 10 MG/1
TABLET ORAL
Qty: 42 TABLET | Refills: 0 | Status: SHIPPED | OUTPATIENT
Start: 2023-07-21

## 2023-08-16 ENCOUNTER — TELEPHONE (OUTPATIENT)
Age: 53
End: 2023-08-16

## 2023-08-16 NOTE — TELEPHONE ENCOUNTER
Patient would like to know if she can  some samples of Ubrevely today at the [place] location today 8/16/23 until her PA is complete? Ssated she is currently dealing with a severe migraine.     Please contact

## 2023-08-16 NOTE — TELEPHONE ENCOUNTER
Msg sent to PA specialist for an update on PA for medication. Called pt back and let her know we do have samples of the 100 mg Ubrelvy at the [address] [place] [address] location she has verbalized understanding.

## 2023-10-02 ENCOUNTER — TELEMEDICINE (OUTPATIENT)
Age: 53
End: 2023-10-02
Payer: COMMERCIAL

## 2023-10-02 ENCOUNTER — TELEPHONE (OUTPATIENT)
Age: 53
End: 2023-10-02

## 2023-10-02 DIAGNOSIS — G43.709 CHRONIC MIGRAINE WITHOUT AURA, NOT INTRACTABLE, WITHOUT STATUS MIGRAINOSUS: Primary | ICD-10-CM

## 2023-10-02 DIAGNOSIS — F43.12 POST-TRAUMATIC STRESS DISORDER, CHRONIC: ICD-10-CM

## 2023-10-02 DIAGNOSIS — F51.5 NIGHTMARE DISORDER: ICD-10-CM

## 2023-10-02 DIAGNOSIS — F41.1 GENERALIZED ANXIETY DISORDER: ICD-10-CM

## 2023-10-02 PROCEDURE — 99214 OFFICE O/P EST MOD 30 MIN: CPT | Performed by: NURSE PRACTITIONER

## 2023-10-02 RX ORDER — ERENUMAB-AOOE 140 MG/ML
INJECTION, SOLUTION SUBCUTANEOUS
Qty: 1 ADJUSTABLE DOSE PRE-FILLED PEN SYRINGE | Refills: 3 | Status: SHIPPED | OUTPATIENT
Start: 2023-10-02

## 2023-10-02 NOTE — PROGRESS NOTES
[address] [facility]  [address] [place] [address] [address]   [phone] Office   [phone] Fax           Date:  10/02/23     Name:  Hood Olivares  :  1970  MRN:  [id number]     PCP:  Juan Cedeño MD    Hood Olivares is a 47 y.o. female who was seen by synchronous (real-time) audio-video technology on 10/2/2023 for Migraine    Subjective:   Continues to have near daily migraines. She has not been able to get the Aimovig as it needs a prior authorization. Up until about a week ago, she did not have the Ubrelvy for the same reason. She does have this now which has helped but she is spacing these out and only taking them on days she has to function. PTSD related nightmares are well managed with the Minipress. Anxiety is well managed with the Effexor XR which was initially started to prevent migraine and Prozac. Recap from LV:  Chronic migraine without aura which is being triggered significantly by ongoing right shoulder and neck pain which has been present since her work-related injury. With the Aimovig, these are much more manageable and Kiara Finn does work for acute management. The nightmares associated with her chronic posttraumatic stress disorder is manageable with the Minipress. Anxiety is better managed with Effexor XR which is also being used for migraine prevention. She is seeing psychiatry for the anxiety and depression. She continues to have chronic pain which is managed by pain and orthopedics. Follow up in six months.     Current Outpatient Medications   Medication Sig    EPINEPHrine (EPIPEN) 0.3 MG/0.3ML SOAJ injection ceived the following from [facility]: Outside name: EPINEPHrine (EPIPEN) 0.3 mg/0.3 mL injection    esomeprazole (NEXIUM) 20 MG delayed release capsule Take by mouth    FLUoxetine (PROZAC) 20 MG capsule Take by mouth daily    LORazepam (ATIVAN) 1 MG

## 2023-10-03 NOTE — TELEPHONE ENCOUNTER
RE:Aimovig  [place]: [address] [facility] notification that medication was approved.       Approved today  PA Case: [id number], Status: Approved, Coverage Starts on: 10/3/2023 12:00:00 AM, Coverage Ends on: 10/2/2024 12:00:00 AM.        Nurse notified

## 2024-01-22 ENCOUNTER — ANESTHESIA EVENT (OUTPATIENT)
Facility: HOSPITAL | Age: 54
End: 2024-01-22
Payer: COMMERCIAL

## 2024-01-22 ENCOUNTER — HOSPITAL ENCOUNTER (OUTPATIENT)
Facility: HOSPITAL | Age: 54
Setting detail: OUTPATIENT SURGERY
Discharge: HOME | End: 2024-01-22
Attending: INTERNAL MEDICINE | Admitting: INTERNAL MEDICINE
Payer: COMMERCIAL

## 2024-01-22 ENCOUNTER — ANESTHESIA (OUTPATIENT)
Facility: HOSPITAL | Age: 54
End: 2024-01-22
Payer: COMMERCIAL

## 2024-01-22 VITALS
HEART RATE: 101 BPM | TEMPERATURE: 98 F | RESPIRATION RATE: 20 BRPM | WEIGHT: 110 LBS | OXYGEN SATURATION: 98 % | BODY MASS INDEX: 20.24 KG/M2 | DIASTOLIC BLOOD PRESSURE: 95 MMHG | SYSTOLIC BLOOD PRESSURE: 135 MMHG | HEIGHT: 62 IN

## 2024-01-22 DIAGNOSIS — K21.9 GASTROESOPHAGEAL REFLUX DISEASE, UNSPECIFIED WHETHER ESOPHAGITIS PRESENT: ICD-10-CM

## 2024-01-22 DIAGNOSIS — R10.13 DYSPEPSIA: ICD-10-CM

## 2024-01-22 DIAGNOSIS — R19.4 CHANGE IN BOWEL HABITS: ICD-10-CM

## 2024-01-22 DIAGNOSIS — Z12.11 COLON CANCER SCREENING: ICD-10-CM

## 2024-01-22 PROCEDURE — 7100000011 HC PHASE II RECOVERY - ADDTL 15 MIN: Performed by: INTERNAL MEDICINE

## 2024-01-22 PROCEDURE — 6360000002 HC RX W HCPCS: Performed by: NURSE ANESTHETIST, CERTIFIED REGISTERED

## 2024-01-22 PROCEDURE — 7100000010 HC PHASE II RECOVERY - FIRST 15 MIN: Performed by: INTERNAL MEDICINE

## 2024-01-22 PROCEDURE — 88305 TISSUE EXAM BY PATHOLOGIST: CPT

## 2024-01-22 PROCEDURE — 3700000001 HC ADD 15 MINUTES (ANESTHESIA): Performed by: INTERNAL MEDICINE

## 2024-01-22 PROCEDURE — 3600007512: Performed by: INTERNAL MEDICINE

## 2024-01-22 PROCEDURE — 3600007502: Performed by: INTERNAL MEDICINE

## 2024-01-22 PROCEDURE — 3700000000 HC ANESTHESIA ATTENDED CARE: Performed by: INTERNAL MEDICINE

## 2024-01-22 PROCEDURE — 2580000003 HC RX 258: Performed by: INTERNAL MEDICINE

## 2024-01-22 PROCEDURE — 2709999900 HC NON-CHARGEABLE SUPPLY: Performed by: INTERNAL MEDICINE

## 2024-01-22 RX ORDER — PROPOFOL 10 MG/ML
INJECTION, EMULSION INTRAVENOUS
Status: COMPLETED
Start: 2024-01-22 | End: 2024-01-22

## 2024-01-22 RX ORDER — GLYCOPYRROLATE 0.2 MG/ML
INJECTION INTRAMUSCULAR; INTRAVENOUS
Status: DISCONTINUED
Start: 2024-01-22 | End: 2024-01-22 | Stop reason: HOSPADM

## 2024-01-22 RX ORDER — FAMOTIDINE 40 MG/1
40 TABLET, FILM COATED ORAL DAILY
COMMUNITY
Start: 2023-11-28

## 2024-01-22 RX ORDER — GLYCOPYRROLATE 0.2 MG/ML
INJECTION INTRAMUSCULAR; INTRAVENOUS PRN
Status: DISCONTINUED | OUTPATIENT
Start: 2024-01-22 | End: 2024-01-22 | Stop reason: SDUPTHER

## 2024-01-22 RX ORDER — SODIUM CHLORIDE, SODIUM LACTATE, POTASSIUM CHLORIDE, CALCIUM CHLORIDE 600; 310; 30; 20 MG/100ML; MG/100ML; MG/100ML; MG/100ML
INJECTION, SOLUTION INTRAVENOUS CONTINUOUS
Status: DISCONTINUED | OUTPATIENT
Start: 2024-01-22 | End: 2024-01-22 | Stop reason: HOSPADM

## 2024-01-22 RX ORDER — LIDOCAINE HYDROCHLORIDE 20 MG/ML
INJECTION, SOLUTION EPIDURAL; INFILTRATION; INTRACAUDAL; PERINEURAL
Status: DISCONTINUED
Start: 2024-01-22 | End: 2024-01-22 | Stop reason: HOSPADM

## 2024-01-22 RX ADMIN — PROPOFOL 50 MG: 10 INJECTION, EMULSION INTRAVENOUS at 11:25

## 2024-01-22 RX ADMIN — PROPOFOL 50 MG: 10 INJECTION, EMULSION INTRAVENOUS at 11:32

## 2024-01-22 RX ADMIN — PROPOFOL 50 MG: 10 INJECTION, EMULSION INTRAVENOUS at 11:30

## 2024-01-22 RX ADMIN — PROPOFOL 50 MG: 10 INJECTION, EMULSION INTRAVENOUS at 11:27

## 2024-01-22 RX ADMIN — GLYCOPYRROLATE 0.2 MG: 0.2 INJECTION, SOLUTION INTRAMUSCULAR; INTRAVENOUS at 11:16

## 2024-01-22 RX ADMIN — PROPOFOL 100 MG: 10 INJECTION, EMULSION INTRAVENOUS at 11:20

## 2024-01-22 RX ADMIN — SODIUM CHLORIDE, POTASSIUM CHLORIDE, SODIUM LACTATE AND CALCIUM CHLORIDE: 600; 310; 30; 20 INJECTION, SOLUTION INTRAVENOUS at 10:43

## 2024-01-22 ASSESSMENT — PAIN - FUNCTIONAL ASSESSMENT
PAIN_FUNCTIONAL_ASSESSMENT: 0-10
PAIN_FUNCTIONAL_ASSESSMENT: NONE - DENIES PAIN
PAIN_FUNCTIONAL_ASSESSMENT: NONE - DENIES PAIN

## 2024-01-22 NOTE — DISCHARGE INSTRUCTIONS
Continue present medication  Repeat colonoscopy in 5-10 years   Return to normal activities tomorrow   Await pathology results  Follow an antireflux regimen  Take a prescribed proton pump inhibitor or H2 blocker (antacids) medication 30-60 minutes before meals

## 2024-01-22 NOTE — ANESTHESIA POSTPROCEDURE EVALUATION
Department of Anesthesiology  Postprocedure Note    Patient: Tabitha Sauer  MRN: [id number]  YOB: 1970  Date of evaluation: 1/22/2024    Procedure Summary     Date: 01/22/24 Room / Location: [facility] ENDO 04 / [facility] ENDOSCOPY    Anesthesia Start: 1116 Anesthesia Stop: 1142    Procedures:       COLONOSCOPY (Lower GI Region)      EGD (Upper GI Region) Diagnosis:       Change in bowel habits      Colon cancer screening      Dyspepsia      Gastroesophageal reflux disease, unspecified whether esophagitis present      (Change in bowel habits [R19.4])      (Colon cancer screening [Z12.11])      (Dyspepsia [R10.13])      (Gastroesophageal reflux disease, unspecified whether esophagitis present [K21.9])    Surgeons: Agnes Guidry MD Responsible Provider: David Goldstein Jr., MD    Anesthesia Type: MAC ASA Status: 2          Anesthesia Type: MAC    Flori Phase I: Flori Score: 10    Flori Phase II:      Anesthesia Post Evaluation    Patient location during evaluation: bedside  Patient participation: complete - patient participated  Level of consciousness: lethargic  Pain score: 0  Airway patency: patent  Nausea & Vomiting: no nausea and no vomiting  Cardiovascular status: hemodynamically stable  Respiratory status: acceptable  Hydration status: stable  Comments: VSS. Report to RN. Remains on stretcher  Pain management: adequate        No notable events documented.

## 2024-01-22 NOTE — ANESTHESIA PRE PROCEDURE
Neuro/Psych:   (+) headaches: migraine headaches, depression/anxiety             GI/Hepatic/Renal:   (+) GERD:, bowel prep,           Endo/Other: Negative Endo/Other ROS                    Abdominal:              PE comment: Deferred.   Vascular: negative vascular ROS.         Other Findings:           Anesthesia Plan      MAC and TIVA     ASA 2     (Standard ASA monitors: continuous EKG, BP, HR, pulse oximeter, and capnography.)  Induction: intravenous.  continuous noninvasive hemodynamic monitor  MIPS: Prophylactic antiemetics administered.  Anesthetic plan and risks discussed with patient (and family, if present.).      Plan discussed with CRNA.                    David Goldstein Jr., MD   1/22/2024

## 2024-01-29 ENCOUNTER — TELEMEDICINE (OUTPATIENT)
Age: 54
End: 2024-01-29
Payer: COMMERCIAL

## 2024-01-29 DIAGNOSIS — F51.5 NIGHTMARE DISORDER: ICD-10-CM

## 2024-01-29 DIAGNOSIS — F43.12 POST-TRAUMATIC STRESS DISORDER, CHRONIC: ICD-10-CM

## 2024-01-29 DIAGNOSIS — F41.1 GENERALIZED ANXIETY DISORDER: ICD-10-CM

## 2024-01-29 DIAGNOSIS — G43.709 CHRONIC MIGRAINE WITHOUT AURA, NOT INTRACTABLE, WITHOUT STATUS MIGRAINOSUS: Primary | ICD-10-CM

## 2024-01-29 PROCEDURE — 99214 OFFICE O/P EST MOD 30 MIN: CPT | Performed by: NURSE PRACTITIONER

## 2024-01-29 RX ORDER — ERENUMAB-AOOE 140 MG/ML
INJECTION, SOLUTION SUBCUTANEOUS
Qty: 1 ML | Refills: 3 | Status: SHIPPED | OUTPATIENT
Start: 2024-01-29

## 2024-01-29 NOTE — PROGRESS NOTES
[facility]  [facility]  NEUROLOGY CLINIC   [address]   [place] [address]   [phone] Office   [phone] Fax           Date:  24     Name:  YUDY SAUER  :  1970  MRN:  [id number]     PCP:  Kaushal Villavicencio MD    Yudy Sauer is a 53 y.o. female who was seen by synchronous (real-time) audio-video technology on 2024 for Migraine    Subjective:   For the past week, she has not had a bad migraine. She does have a headache of some form or fashion on a daily basis. She has not been able to get the Aimovig as it still needs a prior authorization. When the headaches are intolerable, she will take the Ubrelvy which does work. PTSD related nightmares are well managed with the Minipress. Anxiety is well managed with the Effexor XR which was initially started to prevent migraine and Prozac.     Recap from LV:  Chronic migraine which has been hard to manage as she has not had the Aimovig in almost a year. She has been on beta blockers and is normotensive as well as Topamax and Elavil in the past. She is presently on Effexor as well as Prozac, neither of which are helping in terms of prevention though they have been beneficial with regard to PTSD related anxiety. She has also had a trial of Botox. Will try to get the Aimovig approved. This has been highly effective for her in the past.     PTSD related generalized anxiety and nightmare are well managed between the Prozac, Effexor, and Minipress.      Follow up in four months or sooner if needed.     Current Outpatient Medications   Medication Sig    famotidine (PEPCID) 40 MG tablet Take 1 tablet by mouth daily    Ubrogepant 100 MG TABS TAKE ONE TABLET BY MOUTH AS NEEDED FOR MIGRAINE. MAY REPEAT DOSE ONCE IF NEEDED. MAXIMUM OF 2 DOSES IN 24 HOURS    EPINEPHrine (EPIPEN) 0.3 MG/0.3ML SOAJ injection ceived the following from [facility]: Outside

## 2024-01-29 NOTE — PROGRESS NOTES
[facility]  [facility]  NEUROLOGY CLINIC   [address]   [place] [address]   [phone] Office   [phone] Fax           Date:  10/02/23     Name:  YUDY SAUER  :  1970  MRN:  [id number]     PCP:  Kaushal Villavicencio MD    Yudy Sauer is a 52 y.o. female who was seen by synchronous (real-time) audio-video technology on 10/2/2023 for Migraine    Subjective:   Continues to have near daily migraines. She has not been able to get the Aimovig as it needs a prior authorization. Up until about a week ago, she did not have the Ubrelvy for the same reason. She does have this now which has helped but she is spacing these out and only taking them on days she has to function. PTSD related nightmares are well managed with the Minipress. Anxiety is well managed with the Effexor XR which was initially started to prevent migraine and Prozac.     Recap from LV:  Chronic migraine without aura which is being triggered significantly by ongoing right shoulder and neck pain which has been present since her work-related injury.  With the Aimovig, these are much more manageable and Ubrelvy does work for acute management.  The nightmares associated with her chronic posttraumatic stress disorder is manageable with the Minipress. Anxiety is better managed with Effexor XR which is also being used for migraine prevention. She is seeing psychiatry for the anxiety and depression. She continues to have chronic pain which is managed by pain and orthopedics. Follow up in six months.    Current Outpatient Medications   Medication Sig    EPINEPHrine (EPIPEN) 0.3 MG/0.3ML SOAJ injection ceived the following from [facility]: Outside name: EPINEPHrine (EPIPEN) 0.3 mg/0.3 mL injection    esomeprazole (NEXIUM) 20 MG delayed release capsule Take by mouth    FLUoxetine (PROZAC) 20 MG capsule Take by mouth daily    LORazepam (ATIVAN) 1 MG

## 2024-04-08 DIAGNOSIS — G43.709 CHRONIC MIGRAINE WITHOUT AURA, NOT INTRACTABLE, WITHOUT STATUS MIGRAINOSUS: ICD-10-CM

## 2024-04-09 RX ORDER — UBROGEPANT 100 MG/1
TABLET ORAL
Qty: 10 TABLET | Refills: 5 | Status: SHIPPED | OUTPATIENT
Start: 2024-04-09

## 2024-04-28 RX ORDER — VENLAFAXINE HYDROCHLORIDE 150 MG/1
150 CAPSULE, EXTENDED RELEASE ORAL DAILY
Qty: 90 CAPSULE | Refills: 3 | OUTPATIENT
Start: 2024-04-28

## 2024-04-28 RX ORDER — VENLAFAXINE HYDROCHLORIDE 150 MG/1
150 CAPSULE, EXTENDED RELEASE ORAL DAILY
Qty: 30 CAPSULE | Refills: 4 | Status: SHIPPED | OUTPATIENT
Start: 2024-04-28

## 2024-05-01 ENCOUNTER — TELEPHONE (OUTPATIENT)
Age: 54
End: 2024-05-01

## 2024-05-01 RX ORDER — VENLAFAXINE HYDROCHLORIDE 150 MG/1
150 CAPSULE, EXTENDED RELEASE ORAL DAILY
Qty: 30 CAPSULE | Refills: 4 | Status: SHIPPED | OUTPATIENT
Start: 2024-05-01

## 2024-05-06 RX ORDER — VENLAFAXINE HYDROCHLORIDE 150 MG/1
150 CAPSULE, EXTENDED RELEASE ORAL DAILY
Qty: 30 CAPSULE | Refills: 4 | Status: SHIPPED | OUTPATIENT
Start: 2024-05-06

## 2024-06-24 DIAGNOSIS — G43.709 CHRONIC MIGRAINE WITHOUT AURA, NOT INTRACTABLE, WITHOUT STATUS MIGRAINOSUS: ICD-10-CM

## 2024-06-24 RX ORDER — ERENUMAB-AOOE 140 MG/ML
INJECTION, SOLUTION SUBCUTANEOUS
Qty: 1 ML | Refills: 0 | Status: SHIPPED | OUTPATIENT
Start: 2024-06-24

## 2024-07-16 DIAGNOSIS — G43.709 CHRONIC MIGRAINE WITHOUT AURA, NOT INTRACTABLE, WITHOUT STATUS MIGRAINOSUS: ICD-10-CM

## 2024-08-05 RX ORDER — ERENUMAB-AOOE 140 MG/ML
INJECTION, SOLUTION SUBCUTANEOUS
Qty: 1 ML | Refills: 0 | Status: SHIPPED | OUTPATIENT
Start: 2024-08-05

## 2024-08-30 ENCOUNTER — HOSPITAL ENCOUNTER (OUTPATIENT)
Facility: HOSPITAL | Age: 54
Discharge: HOME | End: 2024-08-30
Payer: COMMERCIAL

## 2024-08-30 DIAGNOSIS — S32.058A OTHER CLOSED FRACTURE OF FIFTH LUMBAR VERTEBRA, INITIAL ENCOUNTER (HCC): ICD-10-CM

## 2024-08-30 DIAGNOSIS — M54.50 LUMBAR SPINE PAIN: ICD-10-CM

## 2024-08-30 PROCEDURE — 72148 MRI LUMBAR SPINE W/O DYE: CPT

## 2024-09-07 ENCOUNTER — HOSPITAL ENCOUNTER (EMERGENCY)
Facility: HOSPITAL | Age: 54
Discharge: HOME | End: 2024-09-07
Attending: EMERGENCY MEDICINE
Payer: COMMERCIAL

## 2024-09-07 ENCOUNTER — APPOINTMENT (OUTPATIENT)
Facility: HOSPITAL | Age: 54
End: 2024-09-07
Payer: COMMERCIAL

## 2024-09-07 VITALS
TEMPERATURE: 98 F | RESPIRATION RATE: 15 BRPM | DIASTOLIC BLOOD PRESSURE: 82 MMHG | HEART RATE: 90 BPM | HEIGHT: 63 IN | BODY MASS INDEX: 21.09 KG/M2 | WEIGHT: 119 LBS | SYSTOLIC BLOOD PRESSURE: 134 MMHG | OXYGEN SATURATION: 99 %

## 2024-09-07 DIAGNOSIS — I47.10 PAROXYSMAL SUPRAVENTRICULAR TACHYCARDIA (HCC): Primary | ICD-10-CM

## 2024-09-07 LAB
ALBUMIN SERPL-MCNC: 3.3 G/DL (ref 3.5–5)
ALBUMIN/GLOB SERPL: 1.1 (ref 1.1–2.2)
ALP SERPL-CCNC: 89 U/L (ref 45–117)
ALT SERPL-CCNC: 27 U/L (ref 12–78)
ANION GAP SERPL CALC-SCNC: 9 MMOL/L (ref 2–12)
AST SERPL W P-5'-P-CCNC: 12 U/L (ref 15–37)
BASOPHILS # BLD: 0 K/UL (ref 0–0.1)
BASOPHILS NFR BLD: 1 % (ref 0–1)
BILIRUB SERPL-MCNC: 0.3 MG/DL (ref 0.2–1)
BUN SERPL-MCNC: 22 MG/DL (ref 6–20)
BUN/CREAT SERPL: 31 (ref 12–20)
CA-I BLD-MCNC: 9.2 MG/DL (ref 8.5–10.1)
CHLORIDE SERPL-SCNC: 107 MMOL/L (ref 97–108)
CO2 SERPL-SCNC: 24 MMOL/L (ref 21–32)
CREAT SERPL-MCNC: 0.7 MG/DL (ref 0.55–1.02)
DIFFERENTIAL METHOD BLD: ABNORMAL
EKG ATRIAL RATE: 91 BPM
EKG DIAGNOSIS: NORMAL
EKG P AXIS: 70 DEGREES
EKG P-R INTERVAL: 110 MS
EKG Q-T INTERVAL: 330 MS
EKG QRS DURATION: 76 MS
EKG QTC CALCULATION (BAZETT): 405 MS
EKG R AXIS: 51 DEGREES
EKG T AXIS: 65 DEGREES
EKG VENTRICULAR RATE: 91 BPM
EOSINOPHIL # BLD: 0.1 K/UL (ref 0–0.4)
EOSINOPHIL NFR BLD: 2 % (ref 0–7)
ERYTHROCYTE [DISTWIDTH] IN BLOOD BY AUTOMATED COUNT: 13.2 % (ref 11.5–14.5)
GLOBULIN SER CALC-MCNC: 2.9 G/DL (ref 2–4)
GLUCOSE SERPL-MCNC: 105 MG/DL (ref 65–100)
HCT VFR BLD AUTO: 36.7 % (ref 35–47)
HGB BLD-MCNC: 12.1 G/DL (ref 11.5–16)
IMM GRANULOCYTES # BLD AUTO: 0.1 K/UL (ref 0–0.04)
IMM GRANULOCYTES NFR BLD AUTO: 1 % (ref 0–0.5)
LYMPHOCYTES # BLD: 1 K/UL (ref 0.8–3.5)
LYMPHOCYTES NFR BLD: 16 % (ref 12–49)
MAGNESIUM SERPL-MCNC: 1.9 MG/DL (ref 1.6–2.4)
MCH RBC QN AUTO: 29.7 PG (ref 26–34)
MCHC RBC AUTO-ENTMCNC: 33 G/DL (ref 30–36.5)
MCV RBC AUTO: 90 FL (ref 80–99)
MONOCYTES # BLD: 0.4 K/UL (ref 0–1)
MONOCYTES NFR BLD: 6 % (ref 5–13)
NEUTS SEG # BLD: 4.5 K/UL (ref 1.8–8)
NEUTS SEG NFR BLD: 74 % (ref 32–75)
NRBC # BLD: 0 K/UL (ref 0–0.01)
NRBC BLD-RTO: 0 PER 100 WBC
PLATELET # BLD AUTO: 201 K/UL (ref 150–400)
PMV BLD AUTO: 10.5 FL (ref 8.9–12.9)
POTASSIUM SERPL-SCNC: 4 MMOL/L (ref 3.5–5.1)
PROT SERPL-MCNC: 6.2 G/DL (ref 6.4–8.2)
RBC # BLD AUTO: 4.08 M/UL (ref 3.8–5.2)
SODIUM SERPL-SCNC: 140 MMOL/L (ref 136–145)
TROPONIN I SERPL HS-MCNC: <4 NG/L (ref 0–51)
WBC # BLD AUTO: 6.1 K/UL (ref 3.6–11)

## 2024-09-07 PROCEDURE — 2580000003 HC RX 258: Performed by: EMERGENCY MEDICINE

## 2024-09-07 PROCEDURE — 83735 ASSAY OF MAGNESIUM: CPT

## 2024-09-07 PROCEDURE — 99285 EMERGENCY DEPT VISIT HI MDM: CPT

## 2024-09-07 PROCEDURE — 36415 COLL VENOUS BLD VENIPUNCTURE: CPT

## 2024-09-07 PROCEDURE — 85025 COMPLETE CBC W/AUTO DIFF WBC: CPT

## 2024-09-07 PROCEDURE — 71045 X-RAY EXAM CHEST 1 VIEW: CPT

## 2024-09-07 PROCEDURE — 84484 ASSAY OF TROPONIN QUANT: CPT

## 2024-09-07 PROCEDURE — 80053 COMPREHEN METABOLIC PANEL: CPT

## 2024-09-07 PROCEDURE — 93005 ELECTROCARDIOGRAM TRACING: CPT | Performed by: EMERGENCY MEDICINE

## 2024-09-07 RX ORDER — 0.9 % SODIUM CHLORIDE 0.9 %
1000 INTRAVENOUS SOLUTION INTRAVENOUS ONCE
Status: COMPLETED | OUTPATIENT
Start: 2024-09-07 | End: 2024-09-07

## 2024-09-07 RX ADMIN — SODIUM CHLORIDE 1000 ML: 9 INJECTION, SOLUTION INTRAVENOUS at 11:33

## 2024-09-07 ASSESSMENT — PAIN SCALES - GENERAL
PAINLEVEL_OUTOF10: 8
PAINLEVEL_OUTOF10: 0
PAINLEVEL_OUTOF10: 0

## 2024-09-07 ASSESSMENT — LIFESTYLE VARIABLES
HOW OFTEN DO YOU HAVE A DRINK CONTAINING ALCOHOL: NEVER
HOW MANY STANDARD DRINKS CONTAINING ALCOHOL DO YOU HAVE ON A TYPICAL DAY: PATIENT DOES NOT DRINK

## 2024-09-07 ASSESSMENT — PAIN - FUNCTIONAL ASSESSMENT
PAIN_FUNCTIONAL_ASSESSMENT: 0-10
PAIN_FUNCTIONAL_ASSESSMENT: 0-10

## 2024-09-07 NOTE — DISCHARGE INSTRUCTIONS
Thank you for choosing our Emergency Department for your care.  It is our privilege to care for you in your time of need.  In the next several days, you may receive a survey via email or mailed to your home about your experience with our team.  We would greatly appreciate you taking a few minutes to complete the survey, as we use this information to learn what we have done well and what we could be doing better. Thank you for trusting us with your care!    Below you will find a list of your tests from today's visit.   Labs  Recent Results (from the past 12 hour(s))   CBC with Auto Differential    Collection Time: 09/07/24 10:26 AM   Result Value Ref Range    WBC 6.1 3.6 - 11.0 K/uL    RBC 4.08 3.80 - 5.20 M/uL    Hemoglobin 12.1 11.5 - 16.0 g/dL    Hematocrit 36.7 35.0 - 47.0 %    MCV 90.0 80.0 - 99.0 FL    MCH 29.7 26.0 - 34.0 PG    MCHC 33.0 30.0 - 36.5 g/dL    RDW 13.2 11.5 - 14.5 %    Platelets 201 150 - 400 K/uL    MPV 10.5 8.9 - 12.9 FL    Nucleated RBCs 0.0 0.0  WBC    nRBC 0.00 0.00 - 0.01 K/uL    Neutrophils % 74 32 - 75 %    Lymphocytes % 16 12 - 49 %    Monocytes % 6 5 - 13 %    Eosinophils % 2 0 - 7 %    Basophils % 1 0 - 1 %    Immature Granulocytes % 1 (H) 0 - 0.5 %    Neutrophils Absolute 4.5 1.8 - 8.0 K/UL    Lymphocytes Absolute 1.0 0.8 - 3.5 K/UL    Monocytes Absolute 0.4 0.0 - 1.0 K/UL    Eosinophils Absolute 0.1 0.0 - 0.4 K/UL    Basophils Absolute 0.0 0.0 - 0.1 K/UL    Immature Granulocytes Absolute 0.1 (H) 0.00 - 0.04 K/UL    Differential Type AUTOMATED     Comprehensive Metabolic Panel w/ Reflex to MG    Collection Time: 09/07/24 10:26 AM   Result Value Ref Range    Sodium 140 136 - 145 mmol/L    Potassium 4.0 3.5 - 5.1 mmol/L    Chloride 107 97 - 108 mmol/L    CO2 24 21 - 32 mmol/L    Anion Gap 9 2 - 12 mmol/L    Glucose 105 (H) 65 - 100 mg/dL    BUN 22 (H) 6 - 20 mg/dL    Creatinine 0.70 0.55 - 1.02 mg/dL    BUN/Creatinine Ratio 31 (H) 12 - 20      Est, Glom Filt Rate >90 >60  ml/min/1.73m2    Calcium 9.2 8.5 - 10.1 mg/dL    Total Bilirubin 0.3 0.2 - 1.0 mg/dL    AST 12 (L) 15 - 37 U/L    ALT 27 12 - 78 U/L    Alk Phosphatase 89 45 - 117 U/L    Total Protein 6.2 (L) 6.4 - 8.2 g/dL    Albumin 3.3 (L) 3.5 - 5.0 g/dL    Globulin 2.9 2.0 - 4.0 g/dL    Albumin/Globulin Ratio 1.1 1.1 - 2.2     Troponin    Collection Time: 09/07/24 10:26 AM   Result Value Ref Range    Troponin, High Sensitivity <4 0 - 51 ng/L   Magnesium    Collection Time: 09/07/24 10:26 AM   Result Value Ref Range    Magnesium 1.9 1.6 - 2.4 mg/dL       Radiologic Studies  XR CHEST 1 VIEW   Final Result   No acute cardiopulmonary disease.         Electronically signed by Edin Orozco MD        ------------------------------------------------------------------------------------------------------------  The evaluation and treatment you received in the Emergency Department were for an urgent problem. It is important that you follow-up with a doctor, nurse practitioner, or physician assistant to:  (1) confirm your diagnosis,  (2) re-evaluation of changes in your illness and treatment, and (3) for ongoing care. Please take your discharge instructions with you when you go to your follow-up appointment.     If you have any problem arranging a follow-up appointment, contact us!  If your symptoms become worse or you do not improve as expected, please return to us. We are available 24 hours a day.     If a prescription has been provided, please fill it as soon as possible to prevent a delay in treatment. If you have any questions or reservations about taking the medication due to side effects or interactions with other medications, please call your primary care provider or contact us directly.  Again, THANK YOU for choosing us to care for YOU!

## 2024-09-07 NOTE — ED PROVIDER NOTES
esomeprazole (NEXIUM) 20 MG delayed release capsule Take 1 capsule by mouth 2 times daily      FLUoxetine (PROZAC) 20 MG capsule Take by mouth daily      LORazepam (ATIVAN) 1 MG tablet Take 1 tablet by mouth nightly.      medroxyPROGESTERone (DEPO-PROVERA) 150 MG/ML injection Inject into the muscle      prazosin (MINIPRESS) 1 MG capsule TAKE 1 CAPSULE BY MOUTH EVERY NIGHT         Social Determinants of Health:   Social Determinants of Health     Tobacco Use: Low Risk  (9/6/2024)    Received from [facility]    Patient History     Smoking Tobacco Use: Never     Smokeless Tobacco Use: Never     Passive Exposure: Not on file   Alcohol Use: Not At Risk (9/7/2024)    AUDIT-C     Frequency of Alcohol Consumption: Never     Average Number of Drinks: Patient does not drink     Frequency of Binge Drinking: Never   Financial Resource Strain: Not on file   Food Insecurity: Not on file   Transportation Needs: Not on file   Physical Activity: Not on file   Stress: Not on file   Social Connections: Not on file   Intimate Partner Violence: Not on file   Depression: Not at risk (10/21/2021)    [facility]  (prior to 6/17/2023), [facility]  (prior to 6/17/2023)    PHQ-2     Total Score PHQ 2: 0   Housing Stability: Not on file   Interpersonal Safety: Not At Risk (1/22/2024)    Interpersonal Safety Domain Source: IP Abuse Screening     Physical abuse: Denies     Verbal abuse: Denies     Emotional abuse: Denies     Financial abuse: Denies     Sexual abuse: Denies   Utilities: Not on file       PHYSICAL EXAM   Physical Exam  Vitals and nursing note reviewed.   Constitutional:       Appearance: Normal appearance.   HENT:      Head: Normocephalic and atraumatic.      Mouth/Throat:      Mouth: Mucous membranes are moist.   Eyes:      Extraocular Movements: Extraocular movements intact.      Conjunctiva/sclera: Conjunctivae normal.   Cardiovascular:      Rate and Rhythm: Normal rate and  injection  Commonly known as: EPIPEN     esomeprazole 20 MG delayed release capsule  Commonly known as: NexIUM     famotidine 40 MG tablet  Commonly known as: PEPCID     FLUoxetine 20 MG capsule  Commonly known as: PROZAC     LORazepam 1 MG tablet  Commonly known as: ATIVAN     medroxyPROGESTERone 150 MG/ML injection  Commonly known as: DEPO-PROVERA     prazosin 1 MG capsule  Commonly known as: MINIPRESS     Ubrelvy 100 MG Tabs  Generic drug: Ubrogepant  TAKE ONE TABLET BY MOUTH AT ONSET OF HEADACHE. MAY REPEAT DOSE WITH ONE TABLET IN TWO HOURS IF NEEDED. DO NOT EXCEED TWO TABLETS IN 24 HOURS.     venlafaxine 150 MG extended release capsule  Commonly known as: EFFEXOR XR  Take 1 capsule by mouth daily                DISCONTINUED MEDICATIONS:  Discharge Medication List as of 9/7/2024 11:58 AM          I am the Primary Clinician of Record: Liane Burrows MD (electronically signed)    (Please note that parts of this dictation were completed with voice recognition software. Quite often unanticipated grammatical, syntax, homophones, and other interpretive errors are inadvertently transcribed by the computer software. Please disregards these errors. Please excuse any errors that have escaped final proofreading.)     Liane Burrows MD  09/07/24 [id number]

## 2024-09-16 DIAGNOSIS — G43.709 CHRONIC MIGRAINE WITHOUT AURA, NOT INTRACTABLE, WITHOUT STATUS MIGRAINOSUS: ICD-10-CM

## 2024-09-18 RX ORDER — ERENUMAB-AOOE 140 MG/ML
INJECTION, SOLUTION SUBCUTANEOUS
Qty: 1 ML | Refills: 0 | Status: SHIPPED | OUTPATIENT
Start: 2024-09-18

## 2024-11-27 DIAGNOSIS — G43.709 CHRONIC MIGRAINE WITHOUT AURA, NOT INTRACTABLE, WITHOUT STATUS MIGRAINOSUS: ICD-10-CM

## 2024-11-27 RX ORDER — ERENUMAB-AOOE 140 MG/ML
INJECTION, SOLUTION SUBCUTANEOUS
Qty: 1 ML | Refills: 0 | Status: SHIPPED | OUTPATIENT
Start: 2024-11-27 | End: 2024-12-09 | Stop reason: SDUPTHER

## 2024-12-09 ENCOUNTER — TELEPHONE (OUTPATIENT)
Age: 54
End: 2024-12-09

## 2024-12-09 DIAGNOSIS — G43.709 CHRONIC MIGRAINE WITHOUT AURA, NOT INTRACTABLE, WITHOUT STATUS MIGRAINOSUS: ICD-10-CM

## 2024-12-09 NOTE — TELEPHONE ENCOUNTER
Patient requesting refill on Aimovig and Ubrelvy to [facility] to [facility] [place]. Appointment rescheduled to December 27th at 10:30 am.

## 2024-12-10 RX ORDER — ERENUMAB-AOOE 140 MG/ML
INJECTION, SOLUTION SUBCUTANEOUS
Qty: 1 ML | Refills: 3 | Status: SHIPPED | OUTPATIENT
Start: 2024-12-10

## 2024-12-10 RX ORDER — UBROGEPANT 100 MG/1
TABLET ORAL
Qty: 10 TABLET | Refills: 5 | Status: SHIPPED | OUTPATIENT
Start: 2024-12-10

## 2024-12-27 ENCOUNTER — TELEMEDICINE (OUTPATIENT)
Age: 54
End: 2024-12-27
Payer: COMMERCIAL

## 2024-12-27 DIAGNOSIS — F41.1 GENERALIZED ANXIETY DISORDER: ICD-10-CM

## 2024-12-27 DIAGNOSIS — G43.709 CHRONIC MIGRAINE WITHOUT AURA, NOT INTRACTABLE, WITHOUT STATUS MIGRAINOSUS: Primary | ICD-10-CM

## 2024-12-27 DIAGNOSIS — M54.50 ACUTE MIDLINE LOW BACK PAIN WITHOUT SCIATICA: ICD-10-CM

## 2024-12-27 DIAGNOSIS — F43.12 POST-TRAUMATIC STRESS DISORDER, CHRONIC: ICD-10-CM

## 2024-12-27 PROCEDURE — 99214 OFFICE O/P EST MOD 30 MIN: CPT | Performed by: NURSE PRACTITIONER

## 2024-12-27 RX ORDER — UBROGEPANT 100 MG/1
TABLET ORAL
Qty: 10 TABLET | Refills: 5 | Status: SHIPPED | OUTPATIENT
Start: 2024-12-27

## 2024-12-27 RX ORDER — ERENUMAB-AOOE 140 MG/ML
INJECTION, SOLUTION SUBCUTANEOUS
Qty: 1 ML | Refills: 3 | Status: SHIPPED | OUTPATIENT
Start: 2024-12-27

## 2024-12-27 RX ORDER — VENLAFAXINE HYDROCHLORIDE 150 MG/1
150 CAPSULE, EXTENDED RELEASE ORAL DAILY
Qty: 30 CAPSULE | Refills: 4 | Status: SHIPPED | OUTPATIENT
Start: 2024-12-27

## 2024-12-27 RX ORDER — PREDNISONE 10 MG/1
TABLET ORAL
Qty: 42 TABLET | Refills: 0 | Status: SHIPPED | OUTPATIENT
Start: 2024-12-27

## 2024-12-27 RX ORDER — VENLAFAXINE HYDROCHLORIDE 150 MG/1
150 CAPSULE, EXTENDED RELEASE ORAL DAILY
Qty: 30 CAPSULE | Refills: 4 | Status: SHIPPED | OUTPATIENT
Start: 2024-12-27 | End: 2024-12-27 | Stop reason: SDUPTHER

## 2024-12-27 NOTE — PROGRESS NOTES
[facility]  NEUROLOGY CLINIC   [address]   [place] [address]   [phone] Office   [phone] Fax           Date:  24     Name:  YUDY SAUER  :  1970  MRN:  [id number]     PCP:  Kaushal Villavicencio MD    Yudy Sauer is a 53 y.o. female who was seen by synchronous (real-time) audio-video technology on 2024 for Migraine    Subjective:   Once she restarted the Aimovig, she did well with this. Unfortunately, she has not been able to pick this up over the last two to three months. Without it, she is having some form or fashion of headache/migraine for 12-15 days a week. She will take the Ubrelvy which does work. PTSD related nightmares are well managed with the Minipress. Anxiety is well managed with the Effexor XR which was initially started to prevent migraine and Prozac.     She mentions today that she has had significant lower back pain since she had her uterine ablation. She has seen ortho for this but they were not able to identify any issue. She is going to follow up with her OB to see if there is something else going on that is triggering the pain. They are going to evaluate Cyclic Pelvic Pain.     Recap from LV:  Chronic migraine which has been hard to manage as she has not had the Aimovig in almost a year. She has been on beta blockers and is normotensive as well as Topamax and Elavil in the past. She is presently on Effexor as well as Prozac, neither of which are helping in terms of prevention though they have been beneficial with regard to PTSD related anxiety. She has also had a trial of Botox. Will try to get the Aimovig approved. This has been highly effective for her in the past.     PTSD related generalized anxiety and nightmare are well managed between the Prozac, Effexor, and Minipress.      Follow up in four months or sooner if needed.     Current Outpatient Medications

## 2025-01-14 ENCOUNTER — TELEPHONE (OUTPATIENT)
Age: 55
End: 2025-01-14

## 2025-01-15 ENCOUNTER — TELEPHONE (OUTPATIENT)
Age: 55
End: 2025-01-15

## 2025-01-15 NOTE — TELEPHONE ENCOUNTER
Pt stated her RX Aimovig PA had  and need to be updated per insurance ref#[id number]   fax: [phone]

## 2025-01-29 ENCOUNTER — TELEPHONE (OUTPATIENT)
Age: 55
End: 2025-01-29

## 2025-01-29 NOTE — TELEPHONE ENCOUNTER
Ubrelvy PA sent through [employer] 10 per 30 days    Aimovig - Ref# [id number] given to us by Edson at [facility].     I tried through [facility]AN[id number] [employer]ISHOP Case ID #: [id number]            [id number] BIN: [id number]  : 1970    Group ID: [id number] PCN: [id number]  Legal sex: F   Group name: [facility]  Address: [address] [place] [address]              I have called provider services, pharmacy line and member services.  I was able to speak with someone in member services, she transferred me, I was on hold and then message \"I'm disconnecting your call now\". I tried it with [employer] and called without it - but unable to reach someone.     [facility] determined previously no PA is required.     Called [facility] for assistance, its closed for lunch.     Sent Right Fax to [facility] and asked for update, and explained unable to complete the PA, online or on the phone! Faxed to [phone]

## 2025-01-31 ENCOUNTER — TELEPHONE (OUTPATIENT)
Age: 55
End: 2025-01-31

## 2025-02-03 NOTE — TELEPHONE ENCOUNTER
Aimovig denied - letter received but no rationale why. I faxed request for denial letter.     Later today, I receive a [facility] request for Aimovig PA  [facility] [id number]     Will ask Matt and Snehal/HANNAH to process to see if they get the denial.      No letter received from [facility] yet.     I also faxed on same cover sheet to send me the status of the Ubrelvy PA.

## 2025-02-05 ENCOUNTER — TELEPHONE (OUTPATIENT)
Age: 55
End: 2025-02-05

## 2025-02-05 NOTE — TELEPHONE ENCOUNTER
Re: Ubrelvy and Aimovig    Called [facility], ph [phone] gave rep ref number     Aimovi mg/ auto injector   Called in clinicals for PA renewal to S/w Mirna Brown previously approved it and PA  in October.   Case / Ref#  [id number]   Date range: 25 to 26  This was rejecting for override code that pharmacist need to input SCC-10  Called [facility] [phone], asked to run it with the SCC-10  - it went through fine.    Ubrelvy 100 mg, 10 per 30 days   Called in clinicals for PA renewal:   Previously approved,  in Sept.   S/w Mirna WILKINSON  - she ran it for feb, march and April and went through without a P.A.   [facility] pharmacist ran this too -

## 2025-02-24 DIAGNOSIS — G43.709 CHRONIC MIGRAINE WITHOUT AURA, NOT INTRACTABLE, WITHOUT STATUS MIGRAINOSUS: ICD-10-CM

## 2025-02-24 RX ORDER — ERENUMAB-AOOE 140 MG/ML
INJECTION, SOLUTION SUBCUTANEOUS
Qty: 1 ML | Refills: 3 | Status: SHIPPED | OUTPATIENT
Start: 2025-02-24

## 2025-02-24 RX ORDER — UBROGEPANT 100 MG/1
TABLET ORAL
Qty: 10 TABLET | Refills: 5 | Status: SHIPPED | OUTPATIENT
Start: 2025-02-24

## 2025-02-25 ENCOUNTER — TELEPHONE (OUTPATIENT)
Age: 55
End: 2025-02-25

## 2025-03-06 DIAGNOSIS — G43.709 CHRONIC MIGRAINE WITHOUT AURA, NOT INTRACTABLE, WITHOUT STATUS MIGRAINOSUS: ICD-10-CM

## 2025-03-06 RX ORDER — UBROGEPANT 100 MG/1
TABLET ORAL
Qty: 10 TABLET | Refills: 5 | Status: ACTIVE | OUTPATIENT
Start: 2025-03-06

## 2025-03-07 NOTE — TELEPHONE ENCOUNTER
Naa requesting a call to discuss the Ubrelevy prescription.    She needs to know how many migraines a month the patient has.    Fax number - [phone]

## 2025-03-12 ENCOUNTER — TELEPHONE (OUTPATIENT)
Age: 55
End: 2025-03-12

## 2025-03-12 NOTE — TELEPHONE ENCOUNTER
Tabitha,     Good afternoon! I apologize for just getting back to you.  There are certain categories of messaging within our system and when a patient sends a [employer] message and that message is directly routed/forwarded to anyone, it drops into a category that the prior authorization team does not review.      But the good news, my notes from 25, and of course, I am hopeful you have been able to fill your prescriptions based on the following:     Re: Ubrelvy and Aimovig     Called [facility], ph [phone] gave rep ref number      Aimovi mg/ auto injector   Called in clinicals for PA renewal to S/w Mirna Brown previously approved it and PA  in October.   Case / Ref#  [id number]   Date range of approval: 25 to 26  This was rejecting for override code that pharmacist need to input SCC-10  Called [facility] [phone], asked to run it with the SCC-10  - it went through fine.     Ubrelvy 100 mg, 10 per 30 days   Called in clinicals for PA renewal:   Previously approved,  in Sept.   S/w Mirna WILKINSON  - she ran it for feb, march and April and went through without a P.A.   [facility] pharmacist ran this too -                   Electronically signed by Erin Alonso at 2025  1:34 PM       Thank you,   Erin Alonso  My direct dial #   [phone]

## 2025-03-27 ENCOUNTER — TELEPHONE (OUTPATIENT)
Age: 55
End: 2025-03-27

## 2025-03-27 NOTE — TELEPHONE ENCOUNTER
Patient requesting the nurse to call about medication Amivog Injector had a malfunction and she called the [facility] factor and had it to be reissued.    They told her they need a new prescription and the case number is :    [id number] ([employer] is the company)    They said if any questions to call - [phone]     The prescription cannot go to [facility] it has to go to [employer]    The patient stated she's been out for a couple of weeks and now having severe migraines.

## 2025-04-28 ENCOUNTER — TELEMEDICINE (OUTPATIENT)
Age: 55
End: 2025-04-28
Payer: COMMERCIAL

## 2025-04-28 DIAGNOSIS — F41.1 GENERALIZED ANXIETY DISORDER: ICD-10-CM

## 2025-04-28 DIAGNOSIS — G43.709 CHRONIC MIGRAINE WITHOUT AURA, NOT INTRACTABLE, WITHOUT STATUS MIGRAINOSUS: Primary | ICD-10-CM

## 2025-04-28 DIAGNOSIS — F43.12 POST-TRAUMATIC STRESS DISORDER, CHRONIC: ICD-10-CM

## 2025-04-28 PROCEDURE — 99214 OFFICE O/P EST MOD 30 MIN: CPT | Performed by: NURSE PRACTITIONER

## 2025-04-28 RX ORDER — PRAZOSIN HYDROCHLORIDE 1 MG/1
1 CAPSULE ORAL NIGHTLY
Qty: 30 CAPSULE | Refills: 5 | Status: SHIPPED | OUTPATIENT
Start: 2025-04-28

## 2025-04-28 RX ORDER — VENLAFAXINE HYDROCHLORIDE 150 MG/1
150 CAPSULE, EXTENDED RELEASE ORAL DAILY
Qty: 30 CAPSULE | Refills: 5 | Status: SHIPPED | OUTPATIENT
Start: 2025-04-28

## 2025-04-28 RX ORDER — ERENUMAB-AOOE 140 MG/ML
INJECTION, SOLUTION SUBCUTANEOUS
Qty: 1 ML | Refills: 5 | Status: ACTIVE | OUTPATIENT
Start: 2025-04-28

## 2025-04-28 NOTE — PROGRESS NOTES
[facility]  NEUROLOGY CLINIC   [address]   [place] [address]   [phone] Office   [phone] Fax           Date:  25     Name:  YUDY SAUER  :  1970  MRN:  [id number]     PCP:  Kaushal Villavicencio MD    Yudy Sauer is a 54 y.o. female who was seen by synchronous (real-time) audio-video technology on 2025 for Migraine, Back Pain, and PTSD    Subjective:   Once she restarted the Aimovig, she did well with this. She did miss a month due to the injector failure. During this month, the migraines have been worse. She will use either the Ubrelvy or Excedrin Migraine.     PTSD related nightmares are well managed with the Minipress. Anxiety is well managed with the Effexor XR which was initially started to prevent migraine and Prozac.     She mentions today that she has had significant lower back pain since she had her uterine ablation. Due to this, she was sent for an MRI of the lumbar spine which was completely normal. The provider who performed the uterine ablation indicated that it is not Cyclic Pelvic Pain. She indicates that she had an US which was unrevealing.     Recap from LV:  Chronic migraine which has been hard to manage as she has not had the Aimovig in almost a year. She has been on beta blockers and is normotensive as well as Topamax and Elavil in the past. She is presently on Effexor as well as Prozac, neither of which are helping in terms of prevention though they have been beneficial with regard to PTSD related anxiety. She has also had a trial of Botox. Will try to get the Aimovig approved. This has been highly effective for her in the past.     PTSD related generalized anxiety and nightmare are well managed between the Prozac, Effexor, and Minipress.      Follow up in four months or sooner if needed.     Current Outpatient Medications   Medication Sig    Ubrogepant (UBRELVY)

## 2025-05-16 ENCOUNTER — TELEPHONE (OUTPATIENT)
Age: 55
End: 2025-05-16

## 2025-05-16 NOTE — TELEPHONE ENCOUNTER
Ubrelvy PA renewal request faxed through Right fax to Lan LARA at [phone].     Form sent is uploaded in Media. sent through [facility] to Lan LARA     It took 1 hour and 15 minutes to research prior meds and history before submitting the PA to Lan.     Previous Medical and Rx  history:    Nurtec ODT worked but made her too tired. On 7/21/21 note. Ubrelvy samples given at that visit.     10-21-21 Ubrelvy has been beneficial for acute abortive therapy.     On Aimovig for prevention /started Feb 2023    Dx of SVT (supraventricular tachycardia) (HCC) 2015  - unable to take triptans due to cardiac history.     Effexor currently / started 1/3/23     Tried Midrin (seen on 2-16-12) note    She has tried multiple preventatives to include SSRI, SNRI (currently on Effexor XR which does help with anxiety), Topamax,  and BB, currently on Cardizem due to SVT.  (On 10-14-14) note  Botox    When previewing on [facility], the manual PA blue form came up.  I downloaded it and faxed through Right fax instead to [phone] at 11:52 am, total of 12 pages     See documents uploaded to Media today.     Status pending

## 2025-07-16 ENCOUNTER — TELEPHONE (OUTPATIENT)
Age: 55
End: 2025-07-16

## 2025-07-16 NOTE — TELEPHONE ENCOUNTER
Ubrelvy    Sent status update to Lan with the documents that were scanned for the p.a. in May (in Media), copy of the Rx and her Rx history.     Asked Lan to please respond via fax to me with outcome.

## 2025-07-16 NOTE — TELEPHONE ENCOUNTER
Ubrelvy     PA sent through Epic for continuation of treatment     The member recently filled this medication and will be able to return for their next refill according to their plan limits.

## (undated) DEVICE — BIT DRL L12MM DIA2.3MM CERV STP W/ EPOXY RNG DISP PYRENEES

## (undated) DEVICE — 3M™ DURAPORE™ SURGICAL TAPE 1538-3, 3 INCH X 10 YARD (7,5CM X 9,1M), 4 ROLLS/BOX: Brand: 3M™ DURAPORE™

## (undated) DEVICE — 3M™ TEGADERM™ TRANSPARENT FILM DRESSING FRAME STYLE, 1624W, 2-3/8 IN X 2-3/4 IN (6 CM X 7 CM), 100/CT 4CT/CASE: Brand: 3M™ TEGADERM™

## (undated) DEVICE — ROCKER SWITCH PENCIL BLADE ELECTRODE, HOLSTER: Brand: EDGE

## (undated) DEVICE — TIP CLEANER: Brand: VALLEYLAB

## (undated) DEVICE — SUTURE MCRYL SZ 4-0 L27IN ABSRB UD L19MM PS-2 1/2 CIR PRIM Y426H

## (undated) DEVICE — SUTURE VCRL SZ 3-0 L27IN ABSRB UD L26MM SH 1/2 CIR J416H

## (undated) DEVICE — SOLUTION IRRIG 1000ML H2O STRL BLT

## (undated) DEVICE — BONE WAX WHITE: Brand: BONE WAX WHITE

## (undated) DEVICE — TELFA NON-ADHERENT ABSORBENT DRESSING: Brand: TELFA

## (undated) DEVICE — DRAPE,REIN 53X77,STERILE: Brand: MEDLINE

## (undated) DEVICE — NDL SPNE QNCKE 18GX3.5IN LF --

## (undated) DEVICE — BIPOLAR FORCEPS CORD: Brand: VALLEYLAB

## (undated) DEVICE — MEDI-VAC NON-CONDUCTIVE SUCTION TUBING: Brand: CARDINAL HEALTH

## (undated) DEVICE — 3M™ IOBAN™ 2 ANTIMICROBIAL INCISE DRAPE 6650EZ: Brand: IOBAN™ 2

## (undated) DEVICE — 1010 S-DRAPE TOWEL DRAPE 10/BX: Brand: STERI-DRAPE™

## (undated) DEVICE — INFECTION CONTROL KIT SYS

## (undated) DEVICE — DEVON™ KNEE AND BODY STRAP 60" X 3" (1.5 M X 7.6 CM): Brand: DEVON

## (undated) DEVICE — REM POLYHESIVE ADULT PATIENT RETURN ELECTRODE: Brand: VALLEYLAB

## (undated) DEVICE — TOOL 14MH30 LEGEND 14CM 3MM: Brand: MIDAS REX ™

## (undated) DEVICE — INSULATED BLADE ELECTRODE: Brand: EDGE

## (undated) DEVICE — 3M™ TEGADERM™ TRANSPARENT FILM DRESSING FRAME STYLE, 1626W, 4 IN X 4-3/4 IN (10 CM X 12 CM), 50/CT 4CT/CASE: Brand: 3M™ TEGADERM™

## (undated) DEVICE — SYR 5ML 1/5 GRAD LL NSAF LF --

## (undated) DEVICE — GOWN,SIRUS,NONRNF,SETINSLV,XL,20/CS: Brand: MEDLINE

## (undated) DEVICE — CODMAN® SURGICAL PATTIES 3/4" X 3/4" (1.91CM X 1.91CM): Brand: CODMAN®

## (undated) DEVICE — STERILE POLYISOPRENE POWDER-FREE SURGICAL GLOVES WITH EMOLLIENT COATING: Brand: PROTEXIS

## (undated) DEVICE — KENDALL SCD EXPRESS SLEEVES, KNEE LENGTH, MEDIUM: Brand: KENDALL SCD

## (undated) DEVICE — DRAPE,UTILITY,TAPE,15X26,STERILE: Brand: MEDLINE

## (undated) DEVICE — DRAPE XR C ARM 41X74IN LF --

## (undated) DEVICE — (D)STRIP SKN CLSR 0.5X4IN WHT --

## (undated) DEVICE — ACCY PA100-A LEGEND LUB/DIFFUSER 4 PACK: Brand: MIDAS REX®

## (undated) DEVICE — STERILE POLYISOPRENE POWDER-FREE SURGICAL GLOVES: Brand: PROTEXIS

## (undated) DEVICE — MASK ANES INF SZ 2 PREM TAIL VLV INFL PRT UNSCENTED SGL PT

## (undated) DEVICE — DRAPE MICSCP W46XL120IN FOR ZEISS MD FEATURING CLEARLENS

## (undated) DEVICE — 3000CC GUARDIAN II: Brand: GUARDIAN

## (undated) DEVICE — MAGNETIC DRAPE: Brand: DEVON

## (undated) DEVICE — COVER,MAYO STAND,STERILE: Brand: MEDLINE

## (undated) DEVICE — SOLUTION IV 1000ML 0.9% SOD CHL

## (undated) DEVICE — BASIC PACK: Brand: CONVERTORS

## (undated) DEVICE — DRAIN KT WND 10FR RND 400ML --

## (undated) DEVICE — MASTISOL ADHESIVE LIQ 2/3ML

## (undated) DEVICE — STOPCOCK IV 3W --

## (undated) DEVICE — SPONGE: SPECIALTY PEANUT XR 100/CS: Brand: MEDICAL ACTION INDUSTRIES

## (undated) DEVICE — SYRINGE MED 20ML STD CLR PLAS LUERLOCK TIP N CTRL DISP

## (undated) DEVICE — SURGICAL PROCEDURE PACK BASIN MAJ SET CUST NO CAUT

## (undated) DEVICE — DRAPE,CHEST,FENES,15X10,STERIL: Brand: MEDLINE

## (undated) DEVICE — COVER,TABLE,60X90,STERILE: Brand: MEDLINE

## (undated) DEVICE — CATHETER IV 14GA L1.25IN TEF STR HUB INTROCAN SFTY

## (undated) DEVICE — MASK O2 MED AD 7 FT 3 IN 1 W/ STD CONN LTX